# Patient Record
Sex: FEMALE | Race: WHITE | NOT HISPANIC OR LATINO | Employment: FULL TIME | URBAN - METROPOLITAN AREA
[De-identification: names, ages, dates, MRNs, and addresses within clinical notes are randomized per-mention and may not be internally consistent; named-entity substitution may affect disease eponyms.]

---

## 2017-10-03 ENCOUNTER — GENERIC CONVERSION - ENCOUNTER (OUTPATIENT)
Dept: OTHER | Facility: OTHER | Age: 27
End: 2017-10-03

## 2017-11-24 ENCOUNTER — APPOINTMENT (EMERGENCY)
Dept: RADIOLOGY | Facility: HOSPITAL | Age: 27
End: 2017-11-24
Payer: COMMERCIAL

## 2017-11-24 ENCOUNTER — HOSPITAL ENCOUNTER (EMERGENCY)
Facility: HOSPITAL | Age: 27
Discharge: HOME | End: 2017-11-24
Attending: EMERGENCY MEDICINE | Admitting: EMERGENCY MEDICINE
Payer: COMMERCIAL

## 2017-11-24 VITALS
DIASTOLIC BLOOD PRESSURE: 85 MMHG | OXYGEN SATURATION: 97 % | SYSTOLIC BLOOD PRESSURE: 130 MMHG | HEART RATE: 107 BPM | RESPIRATION RATE: 18 BRPM | WEIGHT: 200 LBS | TEMPERATURE: 99.2 F

## 2017-11-24 DIAGNOSIS — S80.211A ABRASION OF RIGHT KNEE, INITIAL ENCOUNTER: ICD-10-CM
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
## 2017-11-24 DIAGNOSIS — M25.531 WRIST PAIN, ACUTE, RIGHT: ICD-10-CM

## 2017-11-24 PROCEDURE — 90715 TDAP VACCINE 7 YRS/> IM: CPT | Performed by: EMERGENCY MEDICINE

## 2017-11-24 PROCEDURE — 99284 EMERGENCY DEPT VISIT MOD MDM: CPT

## 2017-11-24 PROCEDURE — 73110 X-RAY EXAM OF WRIST: CPT

## 2017-11-24 PROCEDURE — 90471 IMMUNIZATION ADMIN: CPT

## 2017-11-24 PROCEDURE — 73564 X-RAY EXAM KNEE 4 OR MORE: CPT

## 2017-11-24 PROCEDURE — 96372 THER/PROPH/DIAG INJ SC/IM: CPT

## 2017-11-24 RX ORDER — KETOROLAC TROMETHAMINE 30 MG/ML
15 INJECTION, SOLUTION INTRAMUSCULAR; INTRAVENOUS ONCE
Status: COMPLETED | OUTPATIENT
Start: 2017-11-24 | End: 2017-11-24

## 2017-11-24 RX ORDER — DROSPIRENONE AND ETHINYL ESTRADIOL 0.03MG-3MG
1 KIT ORAL DAILY
COMMUNITY
End: 2018-09-10 | Stop reason: SDUPTHER

## 2017-11-24 RX ADMIN — TETANUS TOXOID, REDUCED DIPHTHERIA TOXOID AND ACELLULAR PERTUSSIS VACCINE, ADSORBED 0.5 ML: 5; 2.5; 8; 8; 2.5 SUSPENSION INTRAMUSCULAR at 02:44

## 2017-11-24 RX ADMIN — KETOROLAC TROMETHAMINE 15 MG: 30 INJECTION, SOLUTION INTRAMUSCULAR at 02:41
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
## 2017-11-24 NOTE — DISCHARGE INSTRUCTIONS
Abrasion   WHAT YOU NEED TO KNOW:   What is an abrasion? An abrasion is a scrape on your skin  It happens when your skin rubs against a rough surface  Some examples of an abrasion include rug burn, a skinned elbow, or road rash  Abrasions can be many shapes and sizes  The wound may hurt, bleed, bruise, or swell  How can I care for my abrasion? · Wash your hands and dry them with a clean towel  · Press a clean cloth against your wound to stop any bleeding  · Rinse your wound with a lot of clean water  Do not use harsh soap, alcohol, or iodine solutions  · Use a clean, wet cloth to remove any objects, such as small pieces of rocks or dirt  · Rub antibiotic ointment on your wound  This may help prevent infection and help your wound heal     · Cover the wound with a non-stick bandage  Change the bandage daily, and if gets wet or dirty  When should I seek immediate care? · The bleeding does not stop after 10 minutes of firm pressure  · You cannot rinse one or more foreign objects out of your wound  · You have red streaks on your skin coming from your wound  When should I contact my healthcare provider? · You have a fever or chills  · Your abrasion is red, warm, swollen, or draining pus  · You have questions or concerns about your condition or care  CARE AGREEMENT:   You have the right to help plan your care  Learn about your health condition and how it may be treated  Discuss treatment options with your caregivers to decide what care you want to receive  You always have the right to refuse treatment  The above information is an  only  It is not intended as medical advice for individual conditions or treatments  Talk to your doctor, nurse or pharmacist before following any medical regimen to see if it is safe and effective for you    © 2017 Juan Jose0 Jose Iglesias Information is for End User's use only and may not be sold, redistributed or otherwise used for commercial purposes  All illustrations and images included in CareNotes® are the copyrighted property of A D A M , Inc  or [employer]

## 2018-01-10 NOTE — MISCELLANEOUS
Message   Recorded as Task   Date: 11/02/2016 01:31 PM, Created By: Radha Burton   Task Name: Med Renewal Request   Assigned To: Milly Saavedra   Regarding Patient: Luma Herndon, Status: Active   Comment:    Radha Burton - 02 Nov 2016 1:31 PM     TASK CREATED  Caller: Self; Renew Medication; [phone] (Home)  Patient said her insurance is requiring a 90 day supply of her birth control versus the 30 days  She is requesting [facility] - 13 Nov 2016 1:57 PM     TASK EDITED                 sent rx to pharm        Active Problems    1  Cervical cancer screening (V76 2) (Z12 4)   2  Encounter for gynecological examination without abnormal finding (V72 31) (Z01 419)   3  Encounter for routine gynecological examination (V72 31) (Z01 419)   4  Surveillance of previously prescribed contraceptive pill (V25 41) (Z30 41)    Current Meds   1  Drospirenone-Ethinyl Estradiol 3-0 03 MG Oral Tablet; take 1 tablet by mouth daily; Therapy: 38SFR3905 to (Brennon Freitas)  Requested for: 21XGD1669; Last   Rx:31Oct2016 Ordered   2  Janet 3-0 03 MG Oral Tablet; take 1 tablet by mouth daily; Therapy: 04GKT4585 to [phone])  Requested for: 57Laq7666; Last   Rx:57Upn5815 Ordered    Allergies    1   Latex Gloves MISC    Plan  Health Maintenance    · Janet 3-0 03 MG Oral Tablet; take 1 tablet by mouth daily    Signatures   Electronically signed by : Noemy Nicole, ; Nov 2 2016  1:58PM EST                       (Author)

## 2018-01-12 NOTE — MISCELLANEOUS
Message   Recorded as Task   Date: 09/08/2016 09:29 AM, Created By: Madison Danielle   Task Name: Med Renewal Request   Assigned To: Milly Saavedra   Regarding Patient: Bina Lamas, Status: Active   Comment:    Sera Chacon - 08 Sep 2016 9:29 AM     TASK CREATED  Pt needs a refill on her B/C pills  Please advise  [facility] - 27 Sep 2016 9:31 AM     TASK EDITED                 sent to pharm,ángel mccracken/ komal next week        Active Problems    1  Encounter for routine gynecological examination (V72 31) (Z01 419)    Current Meds   1  Jnaet 3-0 03 MG Oral Tablet; take 1 tablet by mouth daily; Therapy: 23VNK8713 to (Evaluate:35Eek3241)  Requested for: 25UCH3333; Last   Rx:92Mmq0376 Ordered    Allergies    1   Latex Gloves MISC    Plan  Encounter for routine gynecological examination    · Janet 3-0 03 MG Oral Tablet; take 1 tablet by mouth daily    Signatures   Electronically signed by : Jesus Jauregui, ; Sep  8 2016  9:31AM EST                       (Author)

## 2018-01-15 NOTE — MISCELLANEOUS
Message   Recorded as Task   Date: 11/02/2016 01:31 PM, Created By: Radah Burton   Task Name: Med Renewal Request   Assigned To: Milly Saavedra   Regarding Patient: Marcus Morales, Status: Active   Comment:    Radha Burton - 02 Nov 2016 1:31 PM     TASK CREATED  Caller: Self; Renew Medication; [phone] (Home)  Patient said her insurance is requiring a 90 day supply of her birth control versus the 30 days  [facility] - 51 Nov 2016 1:57 PM     TASK EDITED                 sent rx to pharm        Active Problems    1  Cervical cancer screening (V76 2) (Z12 4)   2  Encounter for gynecological examination without abnormal finding (V72 31) (Z01 419)   3  Encounter for routine gynecological examination (V72 31) (Z01 419)   4  Surveillance of previously prescribed contraceptive pill (V25 41) (Z30 41)    Current Meds   1  Drospirenone-Ethinyl Estradiol 3-0 03 MG Oral Tablet; take 1 tablet by mouth daily; Therapy: 84JHM5711 to (Anita Robles)  Requested for: 42YJJ8745; Last   Rx:31Oct2016 Ordered   2  Janet 3-0 03 MG Oral Tablet; take 1 tablet by mouth daily; Therapy: 78JQJ0566 to (Hector Sports)  Requested for: 77Rdh1587; Last   Rx:22Kby9470 Ordered    Allergies    1   Latex Gloves MISC    Plan  Health Maintenance    · Janet 3-0 03 MG Oral Tablet; take 1 tablet by mouth daily    Signatures   Electronically signed by : Josh Brown, ; Nov 2 2016  1:58PM EST                       (Author)

## 2018-01-22 VITALS
SYSTOLIC BLOOD PRESSURE: 122 MMHG | DIASTOLIC BLOOD PRESSURE: 76 MMHG | WEIGHT: 202 LBS | HEIGHT: 66 IN | BODY MASS INDEX: 32.47 KG/M2

## 2018-09-07 ENCOUNTER — TELEPHONE (OUTPATIENT)
Dept: OBGYN CLINIC | Facility: CLINIC | Age: 28
End: 2018-09-07

## 2018-09-07 DIAGNOSIS — IMO0001 BIRTH CONTROL: Primary | ICD-10-CM

## 2018-09-07 RX ORDER — DROSPIRENONE AND ETHINYL ESTRADIOL 0.03MG-3MG
1 KIT ORAL DAILY
Qty: 28 TABLET | Refills: 1 | Status: SHIPPED | OUTPATIENT
Start: 2018-09-07 | End: 2018-09-10 | Stop reason: SDUPTHER

## 2018-09-07 NOTE — TELEPHONE ENCOUNTER
Pt called office today, left vm message  Pt called to request refill of BC pills  Pt saw Karley Campos on 10/3/17, scheduled to see her again on 10/5/18  Pt can be reached @ [phone]

## 2018-09-10 RX ORDER — DROSPIRENONE AND ETHINYL ESTRADIOL 0.03MG-3MG
1 KIT ORAL DAILY
Qty: 28 TABLET | Refills: 1 | Status: SHIPPED | OUTPATIENT
Start: 2018-09-10 | End: 2018-10-29 | Stop reason: SDUPTHER

## 2018-09-10 NOTE — TELEPHONE ENCOUNTER
Spoke with Pt today via phone call  Pt informed that Rx refill for Ashley BC pills was forwarded to Pt's pharmacy in EHR by [facility]

## 2018-10-29 DIAGNOSIS — IMO0001 BIRTH CONTROL: ICD-10-CM

## 2018-10-29 RX ORDER — DROSPIRENONE AND ETHINYL ESTRADIOL 0.03MG-3MG
1 KIT ORAL DAILY
Qty: 90 TABLET | Refills: 0 | Status: SHIPPED | OUTPATIENT
Start: 2018-10-29 | End: 2018-11-05 | Stop reason: SDUPTHER

## 2018-11-05 DIAGNOSIS — Z01.419 ENCOUNTER FOR GYNECOLOGICAL EXAMINATION WITHOUT ABNORMAL FINDING: Primary | ICD-10-CM

## 2018-11-05 RX ORDER — DROSPIRENONE AND ETHINYL ESTRADIOL 0.03MG-3MG
1 KIT ORAL DAILY
Qty: 90 TABLET | Refills: 0 | Status: SHIPPED | OUTPATIENT
Start: 2018-11-05 | End: 2019-01-08 | Stop reason: SDUPTHER

## 2019-01-08 ENCOUNTER — ANNUAL EXAM (OUTPATIENT)
Dept: OBGYN CLINIC | Facility: CLINIC | Age: 29
End: 2019-01-08
Payer: COMMERCIAL

## 2019-01-08 VITALS
WEIGHT: 203.4 LBS | SYSTOLIC BLOOD PRESSURE: 122 MMHG | DIASTOLIC BLOOD PRESSURE: 86 MMHG | BODY MASS INDEX: 32.69 KG/M2 | HEIGHT: 66 IN

## 2019-01-08 DIAGNOSIS — Z01.419 ENCOUNTER FOR GYNECOLOGICAL EXAMINATION (GENERAL) (ROUTINE) WITHOUT ABNORMAL FINDINGS: Primary | ICD-10-CM

## 2019-01-08 DIAGNOSIS — Z01.419 ENCOUNTER FOR GYNECOLOGICAL EXAMINATION WITHOUT ABNORMAL FINDING: ICD-10-CM

## 2019-01-08 DIAGNOSIS — Z30.41 SURVEILLANCE OF CONTRACEPTIVE PILL: ICD-10-CM

## 2019-01-08 DIAGNOSIS — E11.9 TYPE 2 DIABETES MELLITUS WITHOUT COMPLICATION, WITHOUT LONG-TERM CURRENT USE OF INSULIN (HCC): ICD-10-CM

## 2019-01-08 DIAGNOSIS — Z12.4 CERVICAL CANCER SCREENING: ICD-10-CM

## 2019-01-08 DIAGNOSIS — B35.9 TINEA: ICD-10-CM

## 2019-01-08 PROCEDURE — G0145 SCR C/V CYTO,THINLAYER,RESCR: HCPCS | Performed by: NURSE PRACTITIONER

## 2019-01-08 PROCEDURE — 99395 PREV VISIT EST AGE 18-39: CPT | Performed by: NURSE PRACTITIONER

## 2019-01-08 RX ORDER — DROSPIRENONE AND ETHINYL ESTRADIOL 0.03MG-3MG
1 KIT ORAL DAILY
Qty: 90 TABLET | Refills: 4 | Status: SHIPPED | OUTPATIENT
Start: 2019-01-08 | End: 2020-01-14 | Stop reason: SDUPTHER

## 2019-01-08 RX ORDER — METFORMIN HYDROCHLORIDE 750 MG/1
1500 TABLET, EXTENDED RELEASE ORAL DAILY
Refills: 0 | COMMUNITY
Start: 2018-10-22 | End: 2021-02-10 | Stop reason: ALTCHOICE

## 2019-01-08 RX ORDER — PRENATAL VIT 91/IRON/FOLIC/DHA 28-975-200
COMBINATION PACKAGE (EA) ORAL
Qty: 36 G | Refills: 0 | Status: SHIPPED | OUTPATIENT
Start: 2019-01-08 | End: 2021-05-06 | Stop reason: ALTCHOICE

## 2019-01-08 RX ORDER — GLIMEPIRIDE 2 MG/1
2 TABLET ORAL DAILY
Refills: 0 | COMMUNITY
Start: 2018-12-08 | End: 2020-01-14 | Stop reason: ALTCHOICE

## 2019-01-08 NOTE — ASSESSMENT & PLAN NOTE
The patient likes current OCP  She denies ACHES and desires to continue  Refill provided  Reviewed reasons to call

## 2019-01-08 NOTE — ASSESSMENT & PLAN NOTE
Recent DM2 diagnosis  The patient reports ha1c was 9 at the time of dx but since starting metformin and glimepiride she reports ha1c has come down to 5ish  She has f/u with PCP arranged in about 2 weeks to discuss med management; she is under the impression that amaryl will be d/c'd  PCP is out of network, thus hx is per pt  Today we discussed preconception considerations and ob implications assoc with this diagnosis  She was advised to call us prior to actively trying to conceive so we can review her meds and make recommendations  She is agreeable to plan

## 2019-01-08 NOTE — ASSESSMENT & PLAN NOTE
Pruritic area over inner upper aspect of left breast with features of tinea  Advised terbinafine cream and conservative skin care   F/u if sx not improved

## 2019-01-08 NOTE — PROGRESS NOTES
Assessment/Plan:    Surveillance of contraceptive pill  The patient likes current OCP  She denies ACHES and desires to continue  Refill provided  Reviewed reasons to call  Type 2 diabetes mellitus without complication (HCC)  Recent DM2 diagnosis  The patient reports ha1c was 9 at the time of dx but since starting metformin and glimepiride she reports ha1c has come down to 5ish  She has f/u with PCP arranged in about 2 weeks to discuss med management; she is under the impression that amaryl will be d/c'd  PCP is out of network, thus hx is per pt  Today we discussed preconception considerations and ob implications assoc with this diagnosis  She was advised to call us prior to actively trying to conceive so we can review her meds and make recommendations  She is agreeable to plan  Encounter for gynecological examination (general) (routine) without abnormal findings  Normal findings on routine annual gyn exam  Recommended monthly SBE, annual CBE  Reviewed ASCCP guidelines and pap was collected  STI testing was offered and declined at this time; the patient is aware that condoms are recommended for all sexual contact for prevention of STI  The patient likes current contraceptive measure and desires to continue  Reviewed diet/activity recommendations and reasons to call  F/u in one year for routine annual gyn exam or sooner PRN  Tinea  Pruritic area over inner upper aspect of left breast with features of tinea  Advised terbinafine cream and conservative skin care  F/u if sx not improved       Diagnoses and all orders for this visit:    Encounter for gynecological examination (general) (routine) without abnormal findings    Cervical cancer screening  -     Liquid-based pap, screening    Surveillance of contraceptive pill    Encounter for gynecological examination without abnormal finding  -     drospirenone-ethinyl estradiol (MARY) 3-0 03 MG per tablet;  Take 1 tablet by mouth daily    Type 2 diabetes mellitus without complication, without long-term current use of insulin (HCC)    Tinea  -     terbinafine (LamISIL) 1 % cream; Apply to affected area once daily for 7 days    Other orders  -     glimepiride (AMARYL) 2 mg tablet; Take 2 mg by mouth daily  -     metFORMIN (GLUCOPHAGE-XR) 750 mg 24 hr tablet; Take 1,500 mg by mouth daily          Subjective:      Patient ID: Clive Smith is a 34 y o  female  This patient presents for routine annual gyn exam    Recently diagnosed with DM2  a1c has improved since starting PO meds  F/u with PCP is scheduled  C/o left breast with itchy spot occurring intermittently for several months  Denies rash, lesions  Hydrocortisone somewhat effective  She denies acute gyn complaints  Likes current OCP and desires to continue; denies ACHES  She denies pelvic pain, abn discharge, breast concerns, bowel/bladder dysfunction, depression/anx   and monog  Denies STI concerns  Considering conception in the next year  Now works at [employer] her new job much more than prior position at [email or web address]         The following portions of the patient's history were reviewed and updated as appropriate: allergies, current medications, past family history, past medical history, past social history, past surgical history and problem list     Review of Systems   Constitutional: Negative  HENT: Negative  Eyes: Negative  Respiratory: Negative  Cardiovascular: Negative  Gastrointestinal: Negative  Endocrine: Negative  Genitourinary: Negative  Musculoskeletal: Negative  Skin: Negative  Itchy area on left breast   Allergic/Immunologic: Negative  Neurological: Negative  Hematological: Negative  Psychiatric/Behavioral: Negative            Objective:      /86 (BP Location: Right arm, Cuff Size: Large)   Ht 5' 6" (1 676 m)   Wt 92 3 kg (203 lb 6 4 oz)   LMP 12/28/2018   BMI 32 83 kg/m²          Physical Exam   Constitutional: She is oriented to person, place, and time  She appears well-developed and well-nourished  HENT:   Head: Normocephalic and atraumatic  Eyes: Pupils are equal, round, and reactive to light  EOM are normal    Neck: Normal range of motion  Neck supple  No thyromegaly present  Cardiovascular: Normal rate, regular rhythm and normal heart sounds  Pulmonary/Chest: Effort normal and breath sounds normal  No respiratory distress  She has no wheezes  She has no rales  She exhibits no mass, no tenderness and no deformity  Right breast exhibits no inverted nipple, no mass, no nipple discharge, no skin change and no tenderness  Left breast exhibits skin change  Left breast exhibits no inverted nipple, no mass, no nipple discharge and no tenderness  Breasts are symmetrical        Abdominal: Soft  She exhibits no distension and no mass  There is no splenomegaly or hepatomegaly  There is no tenderness  There is no rebound and no guarding  Genitourinary: Rectum normal, vagina normal and uterus normal  No breast swelling, tenderness or discharge  No labial fusion  There is no rash, tenderness, lesion or injury on the right labia  There is no rash, tenderness, lesion or injury on the left labia  Cervix exhibits no motion tenderness, no discharge and no friability  Right adnexum displays no mass, no tenderness and no fullness  Left adnexum displays no mass, no tenderness and no fullness  No erythema, tenderness or bleeding in the vagina  No foreign body in the vagina  No vaginal discharge found  Musculoskeletal: Normal range of motion  Lymphadenopathy:     She has no cervical adenopathy  She has no axillary adenopathy  Neurological: She is alert and oriented to person, place, and time  No cranial nerve deficit  Skin: Skin is warm and dry  No rash noted  No cyanosis  Nails show no clubbing  Psychiatric: She has a normal mood and affect   Her speech is normal and behavior is normal  Judgment and thought content normal  Cognition and memory are normal

## 2019-01-08 NOTE — ASSESSMENT & PLAN NOTE
Normal findings on routine annual gyn exam  Recommended monthly SBE, annual CBE  Reviewed ASCCP guidelines and pap was collected  STI testing was offered and declined at this time; the patient is aware that condoms are recommended for all sexual contact for prevention of STI  The patient likes current contraceptive measure and desires to continue  Reviewed diet/activity recommendations and reasons to call  F/u in one year for routine annual gyn exam or sooner PRN

## 2019-01-10 ENCOUNTER — TELEPHONE (OUTPATIENT)
Dept: OBGYN CLINIC | Facility: CLINIC | Age: 29
End: 2019-01-10

## 2019-01-10 LAB
LAB AP GYN PRIMARY INTERPRETATION: NORMAL
LAB AP LMP: NORMAL
Lab: NORMAL

## 2019-01-10 NOTE — TELEPHONE ENCOUNTER
LMOM today @ [phone] per Pt's signed communication consent form in Pt's EHR  Pt informed, via vm message, that her recent Pap smear test result was WNL per CARLOS August's review  Result card mailed to Pt's mailing address listed in Pt's EHR

## 2019-05-23 ENCOUNTER — TELEPHONE (OUTPATIENT)
Dept: OBGYN CLINIC | Facility: CLINIC | Age: 29
End: 2019-05-23

## 2019-05-23 DIAGNOSIS — R30.0 DYSURIA: Primary | ICD-10-CM

## 2019-05-23 DIAGNOSIS — R35.0 FREQUENT URINATION: Primary | ICD-10-CM

## 2019-05-23 RX ORDER — NITROFURANTOIN 25; 75 MG/1; MG/1
100 CAPSULE ORAL 2 TIMES DAILY
Qty: 14 CAPSULE | Refills: 0 | Status: SHIPPED | OUTPATIENT
Start: 2019-05-23 | End: 2020-01-07 | Stop reason: ALTCHOICE

## 2019-05-24 ENCOUNTER — APPOINTMENT (OUTPATIENT)
Dept: LAB | Facility: CLINIC | Age: 29
End: 2019-05-24
Payer: COMMERCIAL

## 2019-05-24 DIAGNOSIS — R35.0 FREQUENT URINATION: ICD-10-CM

## 2019-05-24 LAB
BACTERIA UR QL AUTO: ABNORMAL /HPF
BILIRUB UR QL STRIP: NEGATIVE
CLARITY UR: CLEAR
COLOR UR: YELLOW
GLUCOSE UR STRIP-MCNC: NEGATIVE MG/DL
HGB UR QL STRIP.AUTO: NEGATIVE
HYALINE CASTS #/AREA URNS LPF: ABNORMAL /LPF
KETONES UR STRIP-MCNC: NEGATIVE MG/DL
LEUKOCYTE ESTERASE UR QL STRIP: NEGATIVE
NITRITE UR QL STRIP: POSITIVE
NON-SQ EPI CELLS URNS QL MICRO: ABNORMAL /HPF
PH UR STRIP.AUTO: 7 [PH]
PROT UR STRIP-MCNC: NEGATIVE MG/DL
RBC #/AREA URNS AUTO: ABNORMAL /HPF
SP GR UR STRIP.AUTO: 1.02 (ref 1–1.03)
UROBILINOGEN UR QL STRIP.AUTO: 0.2 E.U./DL
WBC #/AREA URNS AUTO: ABNORMAL /HPF

## 2019-05-24 PROCEDURE — 87086 URINE CULTURE/COLONY COUNT: CPT

## 2019-05-24 PROCEDURE — 87077 CULTURE AEROBIC IDENTIFY: CPT

## 2019-05-24 PROCEDURE — 81001 URINALYSIS AUTO W/SCOPE: CPT

## 2019-05-24 PROCEDURE — 87186 SC STD MICRODIL/AGAR DIL: CPT

## 2019-05-26 LAB — BACTERIA UR CULT: ABNORMAL

## 2019-05-28 ENCOUNTER — TELEPHONE (OUTPATIENT)
Dept: OBGYN CLINIC | Facility: CLINIC | Age: 29
End: 2019-05-28

## 2019-06-19 ENCOUNTER — TELEPHONE (OUTPATIENT)
Dept: OBGYN CLINIC | Facility: CLINIC | Age: 29
End: 2019-06-19

## 2019-06-19 DIAGNOSIS — R30.0 DYSURIA: Primary | ICD-10-CM

## 2019-06-19 DIAGNOSIS — R35.0 FREQUENT URINATION: Primary | ICD-10-CM

## 2019-06-19 RX ORDER — SULFAMETHOXAZOLE AND TRIMETHOPRIM 800; 160 MG/1; MG/1
1 TABLET ORAL EVERY 12 HOURS SCHEDULED
Qty: 14 TABLET | Refills: 0 | Status: SHIPPED | OUTPATIENT
Start: 2019-06-19 | End: 2019-06-26

## 2019-06-20 ENCOUNTER — APPOINTMENT (OUTPATIENT)
Dept: LAB | Facility: CLINIC | Age: 29
End: 2019-06-20
Payer: COMMERCIAL

## 2019-06-20 DIAGNOSIS — R35.0 FREQUENT URINATION: ICD-10-CM

## 2019-06-20 LAB
BACTERIA UR QL AUTO: ABNORMAL /HPF
BILIRUB UR QL STRIP: NEGATIVE
CLARITY UR: ABNORMAL
COLOR UR: YELLOW
GLUCOSE UR STRIP-MCNC: ABNORMAL MG/DL
HGB UR QL STRIP.AUTO: ABNORMAL
HYALINE CASTS #/AREA URNS LPF: ABNORMAL /LPF
KETONES UR STRIP-MCNC: NEGATIVE MG/DL
LEUKOCYTE ESTERASE UR QL STRIP: ABNORMAL
NITRITE UR QL STRIP: NEGATIVE
NON-SQ EPI CELLS URNS QL MICRO: ABNORMAL /HPF
PH UR STRIP.AUTO: 6.5 [PH]
PROT UR STRIP-MCNC: ABNORMAL MG/DL
RBC #/AREA URNS AUTO: ABNORMAL /HPF
SP GR UR STRIP.AUTO: 1.02 (ref 1–1.03)
UROBILINOGEN UR QL STRIP.AUTO: 0.2 E.U./DL
WBC #/AREA URNS AUTO: ABNORMAL /HPF

## 2019-06-20 PROCEDURE — 81001 URINALYSIS AUTO W/SCOPE: CPT

## 2019-06-20 PROCEDURE — 87086 URINE CULTURE/COLONY COUNT: CPT

## 2019-06-22 LAB — BACTERIA UR CULT: NORMAL

## 2019-06-25 ENCOUNTER — TELEPHONE (OUTPATIENT)
Dept: OBGYN CLINIC | Facility: CLINIC | Age: 29
End: 2019-06-25

## 2020-01-07 ENCOUNTER — TELEPHONE (OUTPATIENT)
Dept: OBGYN CLINIC | Facility: CLINIC | Age: 30
End: 2020-01-07

## 2020-01-07 ENCOUNTER — APPOINTMENT (OUTPATIENT)
Dept: LAB | Facility: CLINIC | Age: 30
End: 2020-01-07
Payer: COMMERCIAL

## 2020-01-07 DIAGNOSIS — R30.0 DYSURIA: Primary | ICD-10-CM

## 2020-01-07 DIAGNOSIS — N39.0 URINARY TRACT INFECTION WITHOUT HEMATURIA, SITE UNSPECIFIED: ICD-10-CM

## 2020-01-07 DIAGNOSIS — N39.0 URINARY TRACT INFECTION WITHOUT HEMATURIA, SITE UNSPECIFIED: Primary | ICD-10-CM

## 2020-01-07 LAB
BACTERIA UR QL AUTO: ABNORMAL /HPF
BILIRUB UR QL STRIP: NEGATIVE
CLARITY UR: ABNORMAL
COLOR UR: YELLOW
GLUCOSE UR STRIP-MCNC: ABNORMAL MG/DL
HGB UR QL STRIP.AUTO: ABNORMAL
HYALINE CASTS #/AREA URNS LPF: ABNORMAL /LPF
KETONES UR STRIP-MCNC: NEGATIVE MG/DL
LEUKOCYTE ESTERASE UR QL STRIP: ABNORMAL
NITRITE UR QL STRIP: NEGATIVE
NON-SQ EPI CELLS URNS QL MICRO: ABNORMAL /HPF
PH UR STRIP.AUTO: 6 [PH]
PROT UR STRIP-MCNC: ABNORMAL MG/DL
RBC #/AREA URNS AUTO: ABNORMAL /HPF
SP GR UR STRIP.AUTO: 1.04 (ref 1–1.03)
UROBILINOGEN UR QL STRIP.AUTO: 0.2 E.U./DL
WBC #/AREA URNS AUTO: ABNORMAL /HPF

## 2020-01-07 PROCEDURE — 87086 URINE CULTURE/COLONY COUNT: CPT

## 2020-01-07 PROCEDURE — 87186 SC STD MICRODIL/AGAR DIL: CPT

## 2020-01-07 PROCEDURE — 81001 URINALYSIS AUTO W/SCOPE: CPT

## 2020-01-07 PROCEDURE — 87077 CULTURE AEROBIC IDENTIFY: CPT

## 2020-01-07 RX ORDER — NITROFURANTOIN 25; 75 MG/1; MG/1
100 CAPSULE ORAL 2 TIMES DAILY
Qty: 14 CAPSULE | Refills: 0 | Status: SHIPPED | OUTPATIENT
Start: 2020-01-07 | End: 2020-01-09 | Stop reason: ALTCHOICE

## 2020-01-07 NOTE — TELEPHONE ENCOUNTER
Please ask that she calls to let us know when the urine has been submitted and I will eRx antibiotics  Please advise pushing fluids and observiing for sx of pyelo

## 2020-01-07 NOTE — TELEPHONE ENCOUNTER
Returned Pt's call today  Pt informed that Elin Nieto electronically forwarded Rx for Macrobid 100 mg capsule (Take 1 capsule PO BID for 7 days - dispense 14 refills 0) to Pt's pharmacy listed in EHR  Reiterated to Pt that if urinary symptoms worsen or do not improve after completion of medication, to contact office

## 2020-01-07 NOTE — TELEPHONE ENCOUNTER
Dear Emanuel Snell:    Pt called office today c/o UTI symptoms  Pt states she has UTI history  Pt saw you on 1/8/19 (yearly exam), scheduled to see you again on 1/14/20  Pt further states she has been experiencing UTI symptoms for approximately three days now  Pt reports urinary frequency, burning during urination, and urine with cloudy appearance  Pt denies fever, pyelo symptoms, gross hematuria, and vaginal symptoms at this time  Pt states she has no known allergies to medication at this time  Pt further states she is sexually active with same partner, uses OCP for contraception  Lab orders completed for UA & UC today in Psychiatric (Pt states she uses [address] lab facilities)  Please advise  Thank you!     Shu Gaitan MA

## 2020-01-07 NOTE — TELEPHONE ENCOUNTER
Pt returned call to office today  Pt states "she completed UC & UA lab work today  Pt informed that Audrey Reyez will prescribe Pt medication to address UTI symptoms  Pt encouraged to push fluids by mouth in order to flush out urinary symptoms  Pt instructed on symptoms of Pyelonephritis to observe for per [facility] directive  "Patient Call" routed to Audrey Reyez for completion of antibiotic prescription to be electronically forwarded to Pt's pharmacy listed in EHR

## 2020-01-09 DIAGNOSIS — N39.0 URINARY TRACT INFECTION WITHOUT HEMATURIA, SITE UNSPECIFIED: Primary | ICD-10-CM

## 2020-01-09 LAB — BACTERIA UR CULT: ABNORMAL

## 2020-01-09 RX ORDER — CIPROFLOXACIN 500 MG/1
500 TABLET, FILM COATED ORAL EVERY 12 HOURS SCHEDULED
Qty: 14 TABLET | Refills: 0 | Status: SHIPPED | OUTPATIENT
Start: 2020-01-09 | End: 2020-01-16

## 2020-01-10 ENCOUNTER — TELEPHONE (OUTPATIENT)
Dept: OBGYN CLINIC | Facility: CLINIC | Age: 30
End: 2020-01-10

## 2020-01-10 NOTE — TELEPHONE ENCOUNTER
----- Message from Gia Faith, Franklin Iglesias sent at 1/9/2020  3:34 PM EST -----  Urine culture results were resubmitted with sensitivities attached   She is currently on macrobid, which is listed as intermediate   Given this new info I would advise she she switches to Ciprofloxacin   Rx sent   Advise pushing fluid and observing for sx of pyelo

## 2020-01-10 NOTE — TELEPHONE ENCOUNTER
----- Message from [facility], [address] Vanessa Iglesias sent at 1/9/2020 12:43 PM EST -----  Urine studies are consistent with UTI   Please notify patient and recommend completion of antibiotics as ordered   Please advise pushing fluids and observing for sx of pyelonephritis   If sx are not improved following completion of abx she should schedule an office visit for follow up

## 2020-01-14 ENCOUNTER — ANNUAL EXAM (OUTPATIENT)
Dept: OBGYN CLINIC | Facility: CLINIC | Age: 30
End: 2020-01-14
Payer: COMMERCIAL

## 2020-01-14 VITALS — DIASTOLIC BLOOD PRESSURE: 78 MMHG | WEIGHT: 200 LBS | SYSTOLIC BLOOD PRESSURE: 130 MMHG | BODY MASS INDEX: 32.28 KG/M2

## 2020-01-14 DIAGNOSIS — N39.0 RECURRENT UTI: ICD-10-CM

## 2020-01-14 DIAGNOSIS — Z31.69 ENCOUNTER FOR PRECONCEPTION CONSULTATION: ICD-10-CM

## 2020-01-14 DIAGNOSIS — E11.9 TYPE 2 DIABETES MELLITUS TREATED WITHOUT INSULIN (HCC): ICD-10-CM

## 2020-01-14 DIAGNOSIS — Z01.419 ENCOUNTER FOR GYNECOLOGICAL EXAMINATION WITHOUT ABNORMAL FINDING: ICD-10-CM

## 2020-01-14 DIAGNOSIS — Z30.41 SURVEILLANCE OF CONTRACEPTIVE PILL: ICD-10-CM

## 2020-01-14 DIAGNOSIS — Z01.419 ENCOUNTER FOR GYNECOLOGICAL EXAMINATION (GENERAL) (ROUTINE) WITHOUT ABNORMAL FINDINGS: Primary | ICD-10-CM

## 2020-01-14 DIAGNOSIS — E11.9 TYPE 2 DIABETES MELLITUS WITHOUT COMPLICATION, WITHOUT LONG-TERM CURRENT USE OF INSULIN (HCC): ICD-10-CM

## 2020-01-14 PROCEDURE — 99395 PREV VISIT EST AGE 18-39: CPT | Performed by: NURSE PRACTITIONER

## 2020-01-14 RX ORDER — DROSPIRENONE AND ETHINYL ESTRADIOL 0.03MG-3MG
1 KIT ORAL DAILY
Qty: 90 TABLET | Refills: 4 | Status: SHIPPED | OUTPATIENT
Start: 2020-01-14 | End: 2021-02-05

## 2020-01-14 NOTE — ASSESSMENT & PLAN NOTE
Normal findings on routine annual gyn exam  Recommended monthly SBE, annual CBE  Reviewed ASCCP guidelines and pap noted to be up to date  STI testing was offered and declined at this time; the patient is aware that condoms are recommended for all sexual contact for prevention of STI  The patient likes current contraceptive measure and desires to continue (OCP)  Reviewed diet/activity recommendations and reasons to call  F/u in one year for routine annual gyn exam or sooner PRN

## 2020-01-14 NOTE — ASSESSMENT & PLAN NOTE
DM2 was dx'd in 2018  Managed by PCP in [place]; she has not been seen by Endo  Initial baseline a1c was 9 per patient; down to 5ish last year on metformin and glimepiride  Currently on metformin only  She cannot recall when a1c was last done; she believes most recent was 6 5%  Reviewed obstetrical implications of pregestational diabetes  Advised optimizing a1c prior to conception; discussed risks of uncontrolled DM incl miscarriage, birth defects and 3rd trimester considerations  Recommended TSH and a1c and orders were provided for this  Also advised calling prior to TTC, offered preconception consultation to discuss plan  She agrees to consider

## 2020-01-14 NOTE — ASSESSMENT & PLAN NOTE
H/o 3 UTIs since last annual gyn exam  Currently on Cipro and sx have resolved  Recommended completion of urine culture as OMAR after completing meds  Advised pushing fluids and having low threshold for reporting sx  Today Jersey reports that she had h/o recurrent UTI as a child  She believes she was seen by Peds Uro, but denies recalling imaging or scope having been done  Discussed potential benefit of Uro consultation if UTIs continue  She declines referral today but will consider

## 2020-01-14 NOTE — ASSESSMENT & PLAN NOTE
The patient likes current OCP  She denies ACHES and desires to continue  Discussed importance of maintaining normal a1c and reviewed CV risks of uncontrolled DM2  Refill provided  Reviewed reasons to call

## 2020-01-14 NOTE — ASSESSMENT & PLAN NOTE
Discussed preconception considerations at length  She denies fertility risk factors for self or partner  He does have h/o testicular cysts but Jersey reports he was reassured this was not expected to cause fertility issues; advised low threshold for him to f/u with Uro if TTC without success  Recommended maintaining healthy BMI and reviewed diet/activity recommendations  Advised daily PNV to be started at least 3 mos prior to actively trying to conceive for prevention of ONTD and prevention of morning sickness  Reviewed teratogen avoidance, timed intercourse, reasons to call  See separate subheading re: importance of DM2 management  F/u after 3 mos of actively TTC to trouble shoot if conception has not yet occurred, or sooner as needed  The patient agrees with plan

## 2020-01-14 NOTE — PROGRESS NOTES
Assessment/Plan:    Type 2 diabetes mellitus without complication ([facility] [address] )  DM2 was dx'd in 2018  Managed by PCP in [place]; she has not been seen by Endo  Initial baseline a1c was 9 per patient; down to 5ish last year on metformin and glimepiride  Currently on metformin only  She cannot recall when a1c was last done; she believes most recent was 6 5%  Reviewed obstetrical implications of pregestational diabetes  Advised optimizing a1c prior to conception; discussed risks of uncontrolled DM incl miscarriage, birth defects and 3rd trimester considerations  Recommended TSH and a1c and orders were provided for this  Also advised calling prior to TTC, offered preconception consultation to discuss plan  She agrees to consider  Recurrent UTI  H/o 3 UTIs since last annual gyn exam  Currently on Cipro and sx have resolved  Recommended completion of urine culture as OMAR after completing meds  Advised pushing fluids and having low threshold for reporting sx  Today Cody reports that she had h/o recurrent UTI as a child  She believes she was seen by Peds Uro, but denies recalling imaging or scope having been done  Discussed potential benefit of Uro consultation if UTIs continue  She declines referral today but will consider  Surveillance of contraceptive pill  The patient likes current OCP  She denies ACHES and desires to continue  Discussed importance of maintaining normal a1c and reviewed CV risks of uncontrolled DM2  Refill provided  Reviewed reasons to call  Encounter for preconception consultation  Discussed preconception considerations at length  She denies fertility risk factors for self or partner  He does have h/o testicular cysts but Cody reports he was reassured this was not expected to cause fertility issues; advised low threshold for him to f/u with Uro if TTC without success  Recommended maintaining healthy BMI and reviewed diet/activity recommendations   Advised daily PNV to be started at least 3 mos prior to actively trying to conceive for prevention of ONTD and prevention of morning sickness  Reviewed teratogen avoidance, timed intercourse, reasons to call  See separate subheading re: importance of DM2 management  F/u after 3 mos of actively TTC to trouble shoot if conception has not yet occurred, or sooner as needed  The patient agrees with plan  Encounter for gynecological examination (general) (routine) without abnormal findings  Normal findings on routine annual gyn exam  Recommended monthly SBE, annual CBE  Reviewed ASCCP guidelines and pap noted to be up to date  STI testing was offered and declined at this time; the patient is aware that condoms are recommended for all sexual contact for prevention of STI  The patient likes current contraceptive measure and desires to continue ([facility])  Reviewed diet/activity recommendations and reasons to call  F/u in one year for routine annual gyn exam or sooner PRN  Diagnoses and all orders for this visit:    Encounter for gynecological examination (general) (routine) without abnormal findings    Type 2 diabetes mellitus treated without insulin ([facility])  -     Hemoglobin A1C; Future  -     TSH, 3rd generation with Free T4 reflex; Future    Recurrent UTI  -     Urine culture; Future    Type 2 diabetes mellitus without complication, without long-term current use of insulin ([facility] )    Surveillance of contraceptive pill    Encounter for preconception consultation    Encounter for gynecological examination without abnormal finding  -     drospirenone-ethinyl estradiol (MARY) 3-0 03 MG per tablet; Take 1 tablet by mouth daily          Subjective:      Patient ID: Harman Johnson is a 34 y o  female  This patient presents for routine annual gyn exam    Medically stable  H/o DM2 - last a1c 6 5%, she thinks; only taking metformin at this time; [facility] [place] [place] [place]  3 UTIs in the last year  Currently completing Cipro for C&S proven e coli  Asymptomatic  Today she reports h/o recurrent UTI in childhood - saw Uro  Thinking about TTC in the next year  Spouse has h/o testicular cysts - will not impact fertility per Uro  Withdrawal bleeds are light and reg on ALECIA  Denies ACHES and desires to continue  Initial BP elevated today - pt attributes this to being worried she would be late today, thought she came to the wrong office  Recheck by me is normotensive  She denies acute gyn complaints  She denies pelvic pain, breast concerns, abnormal discharge, bowel/bladder dysfunction, depression/anxiety   and monogamous  She denies STI concerns  Bought a house this year and they are renovating       The following portions of the patient's history were reviewed and updated as appropriate: allergies, current medications, past family history, past medical history, past social history, past surgical history and problem list     Review of Systems   Constitutional: Negative  Respiratory: Negative  Cardiovascular: Negative  Gastrointestinal: Negative  Genitourinary: Negative  Musculoskeletal: Negative  Skin: Negative  Neurological: Negative  Psychiatric/Behavioral: Negative  Objective:      /78   Wt 90 7 kg (200 lb)   LMP 12/26/2019   BMI 32 28 kg/m²          Physical Exam   Constitutional: She is oriented to person, place, and time  She appears well-developed and well-nourished  HENT:   Head: Normocephalic and atraumatic  Eyes: Pupils are equal, round, and reactive to light  EOM are normal    Neck: Normal range of motion  Neck supple  No thyromegaly present  Cardiovascular: Normal rate, regular rhythm and normal heart sounds  Pulmonary/Chest: Effort normal and breath sounds normal  No respiratory distress  She has no wheezes  She has no rales  She exhibits no mass, no tenderness and no deformity  Right breast exhibits no inverted nipple, no mass, no nipple discharge, no skin change and no tenderness  Left breast exhibits no inverted nipple, no mass, no nipple discharge, no skin change and no tenderness  No breast tenderness or discharge  Breasts are symmetrical    Abdominal: Soft  She exhibits no distension and no mass  There is no splenomegaly or hepatomegaly  There is no tenderness  There is no rebound and no guarding  Genitourinary: Rectum normal, vagina normal and uterus normal  No breast tenderness or discharge  No labial fusion  There is no rash, tenderness, lesion or injury on the right labia  There is no rash, tenderness, lesion or injury on the left labia  Cervix exhibits no motion tenderness, no discharge and no friability  Right adnexum displays no mass, no tenderness and no fullness  Left adnexum displays no mass, no tenderness and no fullness  No erythema, tenderness or bleeding in the vagina  No foreign body in the vagina  No vaginal discharge found  Musculoskeletal: Normal range of motion  Lymphadenopathy:     She has no cervical adenopathy  She has no axillary adenopathy  Neurological: She is alert and oriented to person, place, and time  No cranial nerve deficit  Skin: Skin is warm and dry  No rash noted  No cyanosis  Nails show no clubbing  Psychiatric: She has a normal mood and affect   Her speech is normal and behavior is normal  Judgment and thought content normal  Cognition and memory are normal

## 2020-01-21 ENCOUNTER — APPOINTMENT (OUTPATIENT)
Dept: LAB | Facility: CLINIC | Age: 30
End: 2020-01-21
Payer: COMMERCIAL

## 2020-01-21 DIAGNOSIS — N39.0 RECURRENT UTI: ICD-10-CM

## 2020-01-21 PROCEDURE — 87086 URINE CULTURE/COLONY COUNT: CPT

## 2020-01-22 LAB — BACTERIA UR CULT: NORMAL

## 2020-03-25 ENCOUNTER — TELEPHONE (OUTPATIENT)
Dept: OBGYN CLINIC | Facility: CLINIC | Age: 30
End: 2020-03-25

## 2020-03-25 DIAGNOSIS — N39.0 RECURRENT UTI: Primary | ICD-10-CM

## 2020-03-25 RX ORDER — NITROFURANTOIN 25; 75 MG/1; MG/1
100 CAPSULE ORAL 2 TIMES DAILY
Qty: 14 CAPSULE | Refills: 0 | Status: SHIPPED | OUTPATIENT
Start: 2020-03-25 | End: 2020-04-01

## 2020-03-25 NOTE — TELEPHONE ENCOUNTER
Pt said she is having burning when urinating and urgency, she believes she has a uti hang she has had them before

## 2020-04-23 ENCOUNTER — TELEMEDICINE (OUTPATIENT)
Dept: OBGYN CLINIC | Facility: CLINIC | Age: 30
End: 2020-04-23
Payer: COMMERCIAL

## 2020-04-23 DIAGNOSIS — R39.9 UTI SYMPTOMS: Primary | ICD-10-CM

## 2020-04-23 PROCEDURE — 99214 OFFICE O/P EST MOD 30 MIN: CPT | Performed by: NURSE PRACTITIONER

## 2020-04-23 RX ORDER — CIPROFLOXACIN 500 MG/1
500 TABLET, FILM COATED ORAL EVERY 12 HOURS SCHEDULED
Qty: 14 TABLET | Refills: 0 | Status: SHIPPED | OUTPATIENT
Start: 2020-04-23 | End: 2020-04-30

## 2020-04-24 ENCOUNTER — APPOINTMENT (OUTPATIENT)
Dept: LAB | Facility: MEDICAL CENTER | Age: 30
End: 2020-04-24
Payer: COMMERCIAL

## 2020-04-24 DIAGNOSIS — R39.9 UTI SYMPTOMS: ICD-10-CM

## 2020-04-24 LAB
BACTERIA UR QL AUTO: ABNORMAL /HPF
BILIRUB UR QL STRIP: NEGATIVE
CLARITY UR: ABNORMAL
COLOR UR: YELLOW
GLUCOSE UR STRIP-MCNC: ABNORMAL MG/DL
HGB UR QL STRIP.AUTO: ABNORMAL
KETONES UR STRIP-MCNC: NEGATIVE MG/DL
LEUKOCYTE ESTERASE UR QL STRIP: ABNORMAL
NITRITE UR QL STRIP: POSITIVE
NON-SQ EPI CELLS URNS QL MICRO: ABNORMAL /HPF
PH UR STRIP.AUTO: 6 [PH]
PROT UR STRIP-MCNC: ABNORMAL MG/DL
RBC #/AREA URNS AUTO: ABNORMAL /HPF
SP GR UR STRIP.AUTO: 1.04 (ref 1–1.03)
UROBILINOGEN UR QL STRIP.AUTO: 0.2 E.U./DL
WBC #/AREA URNS AUTO: ABNORMAL /HPF

## 2020-04-24 PROCEDURE — 87086 URINE CULTURE/COLONY COUNT: CPT

## 2020-04-24 PROCEDURE — 87186 SC STD MICRODIL/AGAR DIL: CPT

## 2020-04-24 PROCEDURE — 87077 CULTURE AEROBIC IDENTIFY: CPT

## 2020-04-24 PROCEDURE — 81001 URINALYSIS AUTO W/SCOPE: CPT

## 2020-04-26 PROBLEM — R39.9 UTI SYMPTOMS: Status: ACTIVE | Noted: 2020-04-26

## 2020-04-26 LAB — BACTERIA UR CULT: ABNORMAL

## 2021-02-04 DIAGNOSIS — Z01.419 ENCOUNTER FOR GYNECOLOGICAL EXAMINATION WITHOUT ABNORMAL FINDING: ICD-10-CM

## 2021-02-05 RX ORDER — DROSPIRENONE AND ETHINYL ESTRADIOL 0.03MG-3MG
KIT ORAL
Qty: 84 TABLET | Refills: 0 | Status: SHIPPED | OUTPATIENT
Start: 2021-02-05 | End: 2021-04-28

## 2021-02-09 ENCOUNTER — ANNUAL EXAM (OUTPATIENT)
Dept: OBGYN CLINIC | Facility: CLINIC | Age: 31
End: 2021-02-09
Payer: COMMERCIAL

## 2021-02-09 DIAGNOSIS — E11.9 TYPE 2 DIABETES MELLITUS WITHOUT COMPLICATION, WITHOUT LONG-TERM CURRENT USE OF INSULIN (HCC): ICD-10-CM

## 2021-02-09 DIAGNOSIS — Z30.41 SURVEILLANCE OF CONTRACEPTIVE PILL: ICD-10-CM

## 2021-02-09 DIAGNOSIS — Z01.419 ENCOUNTER FOR GYNECOLOGICAL EXAMINATION (GENERAL) (ROUTINE) WITHOUT ABNORMAL FINDINGS: Primary | ICD-10-CM

## 2021-02-09 DIAGNOSIS — R03.0 ELEVATED BLOOD PRESSURE READING IN OFFICE WITHOUT DIAGNOSIS OF HYPERTENSION: ICD-10-CM

## 2021-02-09 PROCEDURE — S0612 ANNUAL GYNECOLOGICAL EXAMINA: HCPCS | Performed by: NURSE PRACTITIONER

## 2021-02-09 NOTE — ASSESSMENT & PLAN NOTE
Initial BP elevated; repeat BP by me was normotensive  Advised f/u with PCP for further evaluation  Discussed recommendations to transition from [address] to progesterone only or hormone free option if cHTN is diagnosed; discussed increased risk of VTE  She will call after seeing PCP and refill for 3 packs will be provided for now  Likes current brand and denies complaints

## 2021-02-09 NOTE — ASSESSMENT & PLAN NOTE
Normal findings on routine annual gyn exam  Recommended monthly SBE, annual CBE  Reviewed ASCCP guidelines and pap noted to be up to date  STI testing was offered and declined at this time; the patient is aware that condoms are recommended for all sexual contact for prevention of STI  The patient likes current contraceptive measure and desires to continue ([facility])  Reviewed diet/activity recommendations and reasons to call  F/u in one year for routine annual gyn exam or sooner PRN

## 2021-02-10 VITALS — DIASTOLIC BLOOD PRESSURE: 84 MMHG | SYSTOLIC BLOOD PRESSURE: 128 MMHG | BODY MASS INDEX: 31.47 KG/M2 | WEIGHT: 195 LBS

## 2021-02-10 PROBLEM — Z31.69 ENCOUNTER FOR PRECONCEPTION CONSULTATION: Status: RESOLVED | Noted: 2020-01-14 | Resolved: 2021-02-10

## 2021-02-10 NOTE — PROGRESS NOTES
Assessment/Plan:    Encounter for gynecological examination (general) (routine) without abnormal findings  Normal findings on routine annual gyn exam  Recommended monthly SBE, annual CBE  Reviewed ASCCP guidelines and pap noted to be up to date  STI testing was offered and declined at this time; the patient is aware that condoms are recommended for all sexual contact for prevention of STI  The patient likes current contraceptive measure and desires to continue ([address] [place])  Reviewed diet/activity recommendations and reasons to call  F/u in one year for routine annual gyn exam or sooner PRN  Surveillance of contraceptive pill  Initial BP elevated; repeat BP by me was normotensive  Advised f/u with PCP for further evaluation  Discussed recommendations to transition from [address] [place] to progesterone only or hormone free option if cHTN is diagnosed; discussed increased risk of VTE  She will call after seeing PCP and refill for 3 packs will be provided for now  Likes current brand and denies complaints  Elevated blood pressure reading in office without diagnosis of hypertension  See discussion under contra surveillance subheading     Type 2 diabetes mellitus without complication ([address] )  H/o DM2, managed by PCP  The patient reports labs were normal last year and she was advised to d/c metformin  Recommended checking current ha1c and order slip was provided for this  Reviewed importance of glycemic control in the context of future pregnancy  Diagnoses and all orders for this visit:    Encounter for gynecological examination (general) (routine) without abnormal findings    Elevated blood pressure reading in office without diagnosis of hypertension    Type 2 diabetes mellitus without complication, without long-term current use of insulin ([address] )  -     Hemoglobin A1C; Future    Surveillance of contraceptive pill          Subjective:      Patient ID: Willa Jackson is a 31 y o  female      This patient presents for routine annual gyn exam    Medically stable  DM2 is resolved per pt; PCP advised d/c'ing meds last year as labs were normal  Elevated BP on arrival; pt denies dx of HTN, attributes this to feeling nervous  Withdrawal bleeds are light and reg on ALECIA  Denies ACHES and desires to continue  She denies acute gyn complaints  She denies pelvic pain, breast concerns, abnormal discharge, bowel/bladder dysfunction, depression/anxiety   and monogamous  She denies STI concerns  Considering a pregnancy in fall 2021     Works at [employer]       The following portions of the patient's history were reviewed and updated as appropriate: allergies, current medications, past family history, past medical history, past social history, past surgical history and problem list     Review of Systems   Constitutional: Negative  Respiratory: Negative  Cardiovascular: Negative  Gastrointestinal: Negative  Genitourinary: Negative  Musculoskeletal: Negative  Skin: Negative  Neurological: Negative  Psychiatric/Behavioral: Negative  Objective:      /84   Wt 88 5 kg (195 lb)   LMP 01/14/2021   BMI 31 47 kg/m²          Physical Exam  Constitutional:       Appearance: She is well-developed  HENT:      Head: Normocephalic and atraumatic  Eyes:      Pupils: Pupils are equal, round, and reactive to light  Neck:      Musculoskeletal: Normal range of motion and neck supple  Thyroid: No thyromegaly  Cardiovascular:      Rate and Rhythm: Normal rate and regular rhythm  Heart sounds: Normal heart sounds  Pulmonary:      Effort: Pulmonary effort is normal  No respiratory distress  Breath sounds: Normal breath sounds  No wheezing or rales  Chest:      Chest wall: No mass, deformity or tenderness  Breasts: Breasts are symmetrical          Right: No inverted nipple, mass, nipple discharge, skin change or tenderness           Left: No inverted nipple, mass, nipple discharge, skin change or tenderness  Abdominal:      General: There is no distension  Palpations: Abdomen is soft  There is no hepatomegaly, splenomegaly or mass  Tenderness: There is no abdominal tenderness  There is no guarding or rebound  Genitourinary:     Labia:         Right: No rash, tenderness, lesion or injury  Left: No rash, tenderness, lesion or injury  Vagina: Normal  No foreign body  No vaginal discharge, erythema, tenderness or bleeding  Cervix: No cervical motion tenderness, discharge or friability  Adnexa:         Right: No mass, tenderness or fullness  Left: No mass, tenderness or fullness  Rectum: Normal    Musculoskeletal: Normal range of motion  Lymphadenopathy:      Cervical: No cervical adenopathy  Skin:     General: Skin is warm and dry  Findings: No rash  Nails: There is no clubbing  Neurological:      Mental Status: She is alert and oriented to person, place, and time  Cranial Nerves: No cranial nerve deficit  Psychiatric:         Speech: Speech normal          Behavior: Behavior normal          Thought Content:  Thought content normal          Judgment: Judgment normal

## 2021-02-10 NOTE — ASSESSMENT & PLAN NOTE
H/o DM2, managed by PCP  The patient reports labs were normal last year and she was advised to d/c metformin  Recommended checking current ha1c and order slip was provided for this  Reviewed importance of glycemic control in the context of future pregnancy

## 2021-04-27 DIAGNOSIS — Z01.419 ENCOUNTER FOR GYNECOLOGICAL EXAMINATION WITHOUT ABNORMAL FINDING: ICD-10-CM

## 2021-04-28 ENCOUNTER — TELEPHONE (OUTPATIENT)
Dept: OBGYN CLINIC | Facility: CLINIC | Age: 31
End: 2021-04-28

## 2021-04-28 RX ORDER — DROSPIRENONE AND ETHINYL ESTRADIOL 0.03MG-3MG
KIT ORAL
Qty: 28 TABLET | Refills: 0 | Status: SHIPPED | OUTPATIENT
Start: 2021-04-28 | End: 2021-05-24

## 2021-04-28 NOTE — TELEPHONE ENCOUNTER
Per my 2/2021 note she was to f/u with her PCP to rule out hypertension, as her BP was elevated at her annual visit  She was given 3 packs at that time   Please contact the patient and inquire as to whether she saw them for further eval

## 2021-04-28 NOTE — TELEPHONE ENCOUNTER
Sure   Please reiterate the importance of r/o cHTN   Use of estrogen containing product in the context of cHTN could increase risk of stroke or heart attack

## 2021-05-02 PROBLEM — J45.20 MILD INTERMITTENT ASTHMA WITHOUT COMPLICATION: Status: ACTIVE | Noted: 2021-05-02

## 2021-05-02 PROBLEM — E78.1 HYPERTRIGLYCERIDEMIA: Status: ACTIVE | Noted: 2021-05-02

## 2021-05-06 ENCOUNTER — OFFICE VISIT (OUTPATIENT)
Dept: INTERNAL MEDICINE CLINIC | Facility: CLINIC | Age: 31
End: 2021-05-06
Payer: COMMERCIAL

## 2021-05-06 VITALS
DIASTOLIC BLOOD PRESSURE: 88 MMHG | HEART RATE: 88 BPM | BODY MASS INDEX: 30.7 KG/M2 | WEIGHT: 191 LBS | TEMPERATURE: 98.7 F | OXYGEN SATURATION: 99 % | SYSTOLIC BLOOD PRESSURE: 132 MMHG | HEIGHT: 66 IN

## 2021-05-06 DIAGNOSIS — E78.1 HYPERTRIGLYCERIDEMIA: ICD-10-CM

## 2021-05-06 DIAGNOSIS — Z00.00 ANNUAL PHYSICAL EXAM: Primary | ICD-10-CM

## 2021-05-06 DIAGNOSIS — E11.9 TYPE 2 DIABETES MELLITUS WITHOUT COMPLICATION, WITHOUT LONG-TERM CURRENT USE OF INSULIN (HCC): ICD-10-CM

## 2021-05-06 DIAGNOSIS — J30.2 SEASONAL ALLERGIC RHINITIS, UNSPECIFIED TRIGGER: ICD-10-CM

## 2021-05-06 DIAGNOSIS — U07.1 COVID-19: ICD-10-CM

## 2021-05-06 PROBLEM — J30.9 RHINITIS, ALLERGIC: Status: ACTIVE | Noted: 2021-05-06

## 2021-05-06 PROCEDURE — 3725F SCREEN DEPRESSION PERFORMED: CPT | Performed by: INTERNAL MEDICINE

## 2021-05-06 PROCEDURE — 99213 OFFICE O/P EST LOW 20 MIN: CPT | Performed by: INTERNAL MEDICINE

## 2021-05-06 PROCEDURE — 99395 PREV VISIT EST AGE 18-39: CPT | Performed by: INTERNAL MEDICINE

## 2021-05-06 RX ORDER — OMEGA-3S/DHA/EPA/FISH OIL/D3 300MG-1000
400 CAPSULE ORAL DAILY
COMMUNITY

## 2021-05-06 RX ORDER — CETIRIZINE HYDROCHLORIDE 10 MG/1
1 TABLET ORAL DAILY PRN
COMMUNITY
End: 2021-08-24 | Stop reason: ALTCHOICE

## 2021-05-06 RX ORDER — ZINC GLUCONATE 50 MG
50 TABLET ORAL DAILY
COMMUNITY
End: 2021-08-24 | Stop reason: ALTCHOICE

## 2021-05-06 RX ORDER — CHLORAL HYDRATE 500 MG
2 CAPSULE ORAL SEE ADMIN INSTRUCTIONS
COMMUNITY

## 2021-05-06 RX ORDER — MULTIVIT-MIN/IRON FUM/FOLIC AC 7.5 MG-4
1 TABLET ORAL DAILY
COMMUNITY
End: 2022-07-14 | Stop reason: ALTCHOICE

## 2021-05-06 NOTE — PROGRESS NOTES
Assessment/Plan:    1  Annual physical exam    2  Type 2 diabetes mellitus without complication, without long-term current use of insulin ([facility] [address] )  Assessment & Plan:  Patient used to be on metformin and glimepiride for her diabetes mellitus  But she has stopped her medications since last 2 years  Last time I saw her at office was March 26, 2019 she has not been checking her blood sugar at home  So will order blood test and decide treatment accordingly  She has been watching diet and trying to lose weight  Advised for 1800 calorie diet he lost about 20 pound weight since I saw her last March 2019      Orders:  -     CBC and differential; Future  -     Comprehensive metabolic panel; Future  -     Hemoglobin A1C; Future  -     TSH, 3rd generation; Future  -     UA (URINE) with reflex to Scope  -     Microalbumin / creatinine urine ratio    3  Hypertriglyceridemia  Assessment & Plan:  Her cholesterol 162, triglycerides 277, LDL 76, HDL 50 on October 17, 2018  Patient has been watching diet for carbs and sugar and saturated fat  Pain to lose weight exercise  She has been exercising about 5 times per week  Orders:  -     Comprehensive metabolic panel; Future  -     Lipid panel; Future  -     TSH, 3rd generation; Future    4  Seasonal allergic rhinitis, unspecified trigger  Assessment & Plan:  Her symptoms are well controlled on Zyrtec      5  BMI 30 0-30 9,adult    6  COVID-19  Assessment & Plan:  She had a COVID-19 infection January 2021 but she recovered well without any residual effect  BMI Counseling: Body mass index is 30 83 kg/m²  The BMI is above normal  Nutrition recommendations include decreasing portion sizes, decreasing fast food intake, consuming healthier snacks, limiting drinks that contain sugar, moderation in carbohydrate intake, reducing intake of saturated and trans fat and reducing intake of cholesterol   Exercise recommendations include exercising 3-5 times per week and strength training exercises  No pharmacotherapy was ordered  Subjective:  Patient presents for annual wellness exam as well as follow-up of her medical problems  Patient ID: Ran Gordillo is a 31 y o  female  HPI   68-year-old white female patient presents for annual wellness exam as well as follow-up of her medical problems  She denies chest pain, shortness with, pain abdomen  Denies cough, fever, chills denies nausea, vomiting, diarrhea  She had a COVID-19 infection January 2021 she has pretty much recovered completely after her COVID-19 infection  She was out of work for 2 weeks during infection but since then she has been working at [employer]  The following portions of the patient's history were reviewed and updated as appropriate:     Past Medical History:  She has a past medical history of Asthma, BMI 30 0-30 9,adult (5/6/2021), COVID-19 (5/6/2021), Diabetes mellitus ([facility] [address] ), DM2 (diabetes mellitus, type 2) ([facility] ), Elevated cortisol level, Hand pain, right, High triglycerides, Hypertriglyceridemia (5/2/2021), Mild intermittent asthma without complication (5/6/5094), Obesity, Rhinitis, allergic (5/6/2021), Sinobronchitis, and Type 2 diabetes mellitus ([facility] [address] )  ,  _______________________________________________________________________  Past Surgical History:   has a past surgical history that includes Cholecystectomy and [place] tooth extraction  ,  _______________________________________________________________________  Family History:  family history includes COPD in her father; Diabetes in her mother; Fibromyalgia in her father; Heart attack in her maternal grandfather and maternal grandmother; Heart disease in her maternal grandfather and maternal grandmother; Hypertension in her mother; No Known Problems in her brother and paternal grandmother; Prostate cancer in her paternal grandfather; Stroke in her maternal grandmother ,  _______________________________________________________________________  Social History:   reports that she has never smoked  She has never used smokeless tobacco  She reports current alcohol use  She reports that she does not use drugs  ,  _______________________________________________________________________  Allergies:  is allergic to latex     _______________________________________________________________________  Current Outpatient Medications   Medication Sig Dispense Refill    cetirizine (ZyrTEC) 10 mg tablet Take 1 tablet by mouth daily as needed      cholecalciferol (VITAMIN D3) 400 units tablet Take 400 Units by mouth daily      drospirenone-ethinyl estradiol (MARY) 3-0 03 MG per tablet TAKE 1 TABLET BY MOUTH EVERY DAY 28 tablet 0    Multiple Vitamins-Minerals (multivitamin with minerals) tablet Take 1 tablet by mouth daily      Omega-3 Fatty Acids (fish oil) 1,000 mg Take 1,000 mg by mouth daily      zinc gluconate 50 mg tablet Take 50 mg by mouth daily       No current facility-administered medications for this visit       _______________________________________________________________________  Review of Systems   Constitutional: Negative for chills and fever  HENT: Negative for congestion, ear pain, hearing loss, nosebleeds, sinus pain, sore throat and trouble swallowing  Eyes: Negative for discharge, redness and visual disturbance  Respiratory: Negative for cough, chest tightness and shortness of breath  Cardiovascular: Negative for chest pain and palpitations  Gastrointestinal: Negative for abdominal pain, blood in stool, constipation, diarrhea, nausea and vomiting  Genitourinary: Negative for dysuria, flank pain, frequency and hematuria  Musculoskeletal: Negative for arthralgias, myalgias and neck pain  Skin: Negative for color change and rash  Neurological: Negative for dizziness, speech difficulty, weakness and headaches     Hematological: Does not bruise/bleed easily  Psychiatric/Behavioral: Negative for agitation and behavioral problems  Objective:  Vitals:    05/06/21 1451   BP: 132/88   BP Location: Right arm   Patient Position: Sitting   Cuff Size: Adult   Pulse: 88   Temp: 98 7 °F (37 1 °C)   TempSrc: Tympanic   SpO2: 99%   Weight: 86 6 kg (191 lb)   Height: 5' 6" (1 676 m)     Body mass index is 30 83 kg/m²  Physical Exam  Vitals signs and nursing note reviewed  Constitutional:       General: She is not in acute distress  Appearance: She is obese  HENT:      Head: Normocephalic and atraumatic  Right Ear: Tympanic membrane, ear canal and external ear normal       Left Ear: Tympanic membrane, ear canal and external ear normal       Nose: Nose normal       Mouth/Throat:      Comments: Patient has a face mask on  Eyes:      General: No scleral icterus  Extraocular Movements: Extraocular movements intact  Conjunctiva/sclera: Conjunctivae normal    Neck:      Musculoskeletal: Normal range of motion and neck supple  No muscular tenderness  Cardiovascular:      Rate and Rhythm: Normal rate and regular rhythm  Pulses: Normal pulses  no weak pulses          Dorsalis pedis pulses are 2+ on the right side  Posterior tibial pulses are 2+ on the right side  Heart sounds: No murmur  Pulmonary:      Effort: Pulmonary effort is normal       Breath sounds: Normal breath sounds  Abdominal:      General: Bowel sounds are normal       Palpations: Abdomen is soft  Tenderness: There is no abdominal tenderness  Musculoskeletal: Normal range of motion  Right lower leg: No edema  Left lower leg: No edema  Feet:      Right foot:      Skin integrity: No ulcer, skin breakdown, erythema, warmth, callus or dry skin  Left foot:      Skin integrity: No ulcer, skin breakdown, erythema, warmth, callus or dry skin  Skin:     General: Skin is warm  Findings: No rash     Neurological:      General: No focal deficit present  Mental Status: She is alert and oriented to person, place, and time  Psychiatric:         Mood and Affect: Mood normal          Behavior: Behavior normal        Patient's shoes and socks removed  Right Foot/Ankle   Right Foot Inspection  Skin Exam: skin normal and skin intact no dry skin, no warmth, no callus, no erythema, no maceration, no abnormal color, no pre-ulcer, no ulcer and no callus                            Sensory   Vibration: intact  Proprioception: intact   Monofilament testing: intact  Vascular    The right DP pulse is 2+  The right PT pulse is 2+  Left Foot/Ankle  Left Foot Inspection  Skin Exam: skin normal and skin intactno dry skin, no warmth, no erythema, no maceration, normal color, no pre-ulcer, no ulcer and no callus                                         Sensory   Vibration: intact  Proprioception: intact  Monofilament: intact    Assign Risk Category:  No deformity present; No loss of protective sensation;  No weak pulses       Risk: 0

## 2021-05-06 NOTE — ASSESSMENT & PLAN NOTE
Patient used to be on metformin and glimepiride for her diabetes mellitus  But she has stopped her medications since last 2 years  Last time I saw her at office was March 26, 2019 she has not been checking her blood sugar at home  So will order blood test and decide treatment accordingly  She has been watching diet and trying to lose weight    Advised for 1800 calorie diet he lost about 20 pound weight since I saw her last March 2019

## 2021-05-06 NOTE — PATIENT INSTRUCTIONS

## 2021-05-06 NOTE — PROGRESS NOTES
[facility] INTERNAL MEDICINE    NAME: Justa Bey  AGE: 31 y o  SEX: female  : 1990     DATE: 2021     Assessment and Plan:     Problem List Items Addressed This Visit        Endocrine    Type 2 diabetes mellitus without complication ([address] )       Respiratory    Rhinitis, allergic       Other    Hypertriglyceridemia    BMI 30 0-30 9,adult      Other Visit Diagnoses     Annual physical exam    -  Primary          Immunizations and preventive care screenings were discussed with patient today  Appropriate education was printed on patient's after visit summary  Counseling:  · Alcohol/drug use: discussed moderation in alcohol intake, the recommendations for healthy alcohol use, and avoidance of illicit drug use  No follow-ups on file  Chief Complaint:     Chief Complaint   Patient presents with    Annual Exam     joint pain since covid infection in 2021      History of Present Illness:     Adult Annual Physical   Patient here for a comprehensive physical exam  The patient reports no problems  Diet and Physical Activity  · Diet/Nutrition: well balanced diet  · Exercise: 5-7 times a week on average  Depression Screening  PHQ-9 Depression Screening    PHQ-9:   Frequency of the following problems over the past two weeks:      Little interest or pleasure in doing things: 0 - not at all  Feeling down, depressed, or hopeless: 0 - not at all  PHQ-2 Score: 0       General Health  · Sleep: sleeps well  · Hearing: significantly decreased - bilateral   · Vision: wears glasses  · Dental: regular dental visits  /GYN Health  · Last menstrual period: 5/3/25261  · Contraceptive method: oral contraceptives  · History of STDs?: no      Review of Systems:     Review of Systems   Constitutional: Negative for chills and fever     HENT: Negative for congestion, ear pain, hearing loss, nosebleeds, sinus pain, sore throat and trouble swallowing  Eyes: Negative for discharge, redness and visual disturbance  Respiratory: Negative for cough, chest tightness and shortness of breath  Cardiovascular: Negative for chest pain and palpitations  Gastrointestinal: Negative for abdominal pain, blood in stool, constipation, diarrhea, nausea and vomiting  Genitourinary: Negative for dysuria, flank pain, frequency and hematuria  Musculoskeletal: Negative for arthralgias, myalgias and neck pain  Skin: Negative for color change and rash  Neurological: Negative for dizziness, speech difficulty, weakness and headaches  Hematological: Does not bruise/bleed easily  Psychiatric/Behavioral: Negative for agitation and behavioral problems           Past Medical History:     Past Medical History:   Diagnosis Date    Asthma     BMI 30 0-30 9,adult 5/6/2021    Diabetes mellitus ([place] [address] )     DM2 [facility]di[facility] 2) ([facility]     Elevated cortisol level     Hand pain, right     High triglycerides     Hypertriglyceridemia 5/2/2021    Mild intermittent asthma without complication 7/0/2129    Obesity     Rhinitis, allergic 5/6/2021    Sinobronchitis     Type 2 diabetes mellitus ([place] [address] )       Past Surgical History:     Past Surgical History:   Procedure Laterality Date    CHOLECYSTECTOMY      WISDOM TOOTH EXTRACTION        Social History:        Social History     Socioeconomic History    Marital status: /Civil Union     Spouse name: None    Number of children: None    Years of education: None    Highest education level: None   Occupational History    None   Social Needs    Financial resource strain: None    Food insecurity     Worry: None     Inability: None    Transportation needs     Medical: None     Non-medical: None   Tobacco Use    Smoking status: Never Smoker    Smokeless tobacco: Never Used   Substance and Sexual Activity    Alcohol use: Yes     Comment: occasionally    Drug use: No    Sexual activity: Yes     Partners: Male     Birth control/protection: OCP   Lifestyle    Physical activity     Days per week: None     Minutes per session: None    Stress: None   Relationships    Social connections     Talks on phone: None     Gets together: None     Attends [employer] service: None     Active member of club or organization: None     Attends meetings of clubs or organizations: None     Relationship status: None    Intimate partner violence     Fear of current or ex partner: None     Emotionally abused: None     Physically abused: None     Forced sexual activity: None   Other Topics Concern    None   Social History Narrative    Lives with     Annual eye exam: Advise to see ophthalmology    Pap smear by her gyn    - As per [employer]      Family History:     Family History   Problem Relation Age of Onset    Diabetes Mother     Hypertension Mother     COPD Father     Fibromyalgia Father     No Known Problems Brother     Heart disease Maternal Grandmother     Stroke Maternal Grandmother     Heart attack Maternal Grandmother     Heart disease Maternal Grandfather     Heart attack Maternal Grandfather     No Known Problems Paternal Grandmother     Prostate cancer Paternal Grandfather       Current Medications:     Current Outpatient Medications   Medication Sig Dispense Refill    cetirizine (ZyrTEC) 10 mg tablet Take 1 tablet by mouth daily as needed      cholecalciferol (VITAMIN D3) 400 units tablet Take 400 Units by mouth daily      drospirenone-ethinyl estradiol (MARY) 3-0 03 MG per tablet TAKE 1 TABLET BY MOUTH EVERY DAY 28 tablet 0    Multiple Vitamins-Minerals (multivitamin with minerals) tablet Take 1 tablet by mouth daily      Omega-3 Fatty Acids (fish oil) 1,000 mg Take 1,000 mg by mouth daily      zinc gluconate 50 mg tablet Take 50 mg by mouth daily       No current facility-administered medications for this visit  Allergies:      Allergies   Allergen Reactions    Latex       Physical Exam:     /88 (BP Location: Right arm, Patient Position: Sitting, Cuff Size: Adult)   Pulse (!) 116   Temp 98 7 °F (37 1 °C) (Tympanic)   Ht 5' 6" (1 676 m)   Wt 86 6 kg (191 lb)   SpO2 99%   BMI 30 83 kg/m²     Physical Exam  Vitals signs and nursing note reviewed  Constitutional:       General: She is not in acute distress  Appearance: She is well-developed  She is obese  HENT:      Head: Normocephalic and atraumatic  Right Ear: Tympanic membrane, ear canal and external ear normal       Left Ear: Tympanic membrane, ear canal and external ear normal       Mouth/Throat:      Comments: Pt  Has face mask on  Eyes:      General: No scleral icterus  Right eye: No discharge  Left eye: No discharge  Extraocular Movements: Extraocular movements intact  Conjunctiva/sclera: Conjunctivae normal       Pupils: Pupils are equal, round, and reactive to light  Neck:      Musculoskeletal: Normal range of motion and neck supple  Cardiovascular:      Rate and Rhythm: Normal rate and regular rhythm  Pulses: Normal pulses  Heart sounds: Normal heart sounds  No murmur  Pulmonary:      Effort: Pulmonary effort is normal  No respiratory distress  Breath sounds: Normal breath sounds  Abdominal:      General: Bowel sounds are normal       Palpations: Abdomen is soft  Tenderness: There is no abdominal tenderness  Musculoskeletal: Normal range of motion  Skin:     General: Skin is warm and dry  Neurological:      General: No focal deficit present  Mental Status: She is alert and oriented to person, place, and time     Psychiatric:         Mood and Affect: Mood normal          Behavior: Behavior normal             Tomeka Gongora MD   [address] [place] [address]

## 2021-05-06 NOTE — ASSESSMENT & PLAN NOTE
Her cholesterol 162, triglycerides 277, LDL 76, HDL 50 on October 17, 2018  Patient has been watching diet for carbs and sugar and saturated fat  Pain to lose weight exercise  She has been exercising about 5 times per week

## 2021-05-13 ENCOUNTER — TELEPHONE (OUTPATIENT)
Dept: OBGYN CLINIC | Facility: CLINIC | Age: 31
End: 2021-05-13

## 2021-05-13 ENCOUNTER — APPOINTMENT (OUTPATIENT)
Dept: LAB | Facility: CLINIC | Age: 31
End: 2021-05-13
Payer: COMMERCIAL

## 2021-05-13 ENCOUNTER — TRANSCRIBE ORDERS (OUTPATIENT)
Dept: LAB | Facility: CLINIC | Age: 31
End: 2021-05-13

## 2021-05-13 DIAGNOSIS — E78.1 HYPERTRIGLYCERIDEMIA: ICD-10-CM

## 2021-05-13 DIAGNOSIS — E11.9 TYPE 2 DIABETES MELLITUS WITHOUT COMPLICATION, WITHOUT LONG-TERM CURRENT USE OF INSULIN (HCC): ICD-10-CM

## 2021-05-13 LAB
ALBUMIN SERPL BCP-MCNC: 3.3 G/DL (ref 3.5–5)
ALP SERPL-CCNC: 57 U/L (ref 46–116)
ALT SERPL W P-5'-P-CCNC: 25 U/L (ref 12–78)
ANION GAP SERPL CALCULATED.3IONS-SCNC: 7 MMOL/L (ref 4–13)
AST SERPL W P-5'-P-CCNC: 20 U/L (ref 5–45)
BACTERIA UR QL AUTO: ABNORMAL /HPF
BASOPHILS # BLD AUTO: 0.02 THOUSANDS/ΜL (ref 0–0.1)
BASOPHILS NFR BLD AUTO: 0 % (ref 0–1)
BILIRUB SERPL-MCNC: 0.72 MG/DL (ref 0.2–1)
BILIRUB UR QL STRIP: NEGATIVE
BUN SERPL-MCNC: 7 MG/DL (ref 5–25)
CALCIUM ALBUM COR SERPL-MCNC: 9.6 MG/DL (ref 8.3–10.1)
CALCIUM SERPL-MCNC: 9 MG/DL (ref 8.3–10.1)
CHLORIDE SERPL-SCNC: 105 MMOL/L (ref 100–108)
CHOLEST SERPL-MCNC: 174 MG/DL (ref 50–200)
CLARITY UR: ABNORMAL
CO2 SERPL-SCNC: 26 MMOL/L (ref 21–32)
COLOR UR: ABNORMAL
CREAT SERPL-MCNC: 0.53 MG/DL (ref 0.6–1.3)
CREAT UR-MCNC: 179 MG/DL
EOSINOPHIL # BLD AUTO: 0.09 THOUSAND/ΜL (ref 0–0.61)
EOSINOPHIL NFR BLD AUTO: 1 % (ref 0–6)
ERYTHROCYTE [DISTWIDTH] IN BLOOD BY AUTOMATED COUNT: 11.8 % (ref 11.6–15.1)
EST. AVERAGE GLUCOSE BLD GHB EST-MCNC: 203 MG/DL
GFR SERPL CREATININE-BSD FRML MDRD: 130 ML/MIN/1.73SQ M
GLUCOSE P FAST SERPL-MCNC: 208 MG/DL (ref 65–99)
GLUCOSE UR STRIP-MCNC: ABNORMAL MG/DL
HBA1C MFR BLD: 8.7 %
HCT VFR BLD AUTO: 44.4 % (ref 34.8–46.1)
HDLC SERPL-MCNC: 51 MG/DL
HGB BLD-MCNC: 15.1 G/DL (ref 11.5–15.4)
HGB UR QL STRIP.AUTO: NEGATIVE
HYALINE CASTS #/AREA URNS LPF: ABNORMAL /LPF
IMM GRANULOCYTES # BLD AUTO: 0.01 THOUSAND/UL (ref 0–0.2)
IMM GRANULOCYTES NFR BLD AUTO: 0 % (ref 0–2)
KETONES UR STRIP-MCNC: ABNORMAL MG/DL
LDLC SERPL CALC-MCNC: 67 MG/DL (ref 0–100)
LEUKOCYTE ESTERASE UR QL STRIP: NEGATIVE
LYMPHOCYTES # BLD AUTO: 2.91 THOUSANDS/ΜL (ref 0.6–4.47)
LYMPHOCYTES NFR BLD AUTO: 38 % (ref 14–44)
MCH RBC QN AUTO: 30.7 PG (ref 26.8–34.3)
MCHC RBC AUTO-ENTMCNC: 34 G/DL (ref 31.4–37.4)
MCV RBC AUTO: 90 FL (ref 82–98)
MICROALBUMIN UR-MCNC: 68.1 MG/L (ref 0–20)
MICROALBUMIN/CREAT 24H UR: 38 MG/G CREATININE (ref 0–30)
MONOCYTES # BLD AUTO: 0.45 THOUSAND/ΜL (ref 0.17–1.22)
MONOCYTES NFR BLD AUTO: 6 % (ref 4–12)
NEUTROPHILS # BLD AUTO: 4.09 THOUSANDS/ΜL (ref 1.85–7.62)
NEUTS SEG NFR BLD AUTO: 55 % (ref 43–75)
NITRITE UR QL STRIP: NEGATIVE
NON-SQ EPI CELLS URNS QL MICRO: ABNORMAL /HPF
NONHDLC SERPL-MCNC: 123 MG/DL
NRBC BLD AUTO-RTO: 0 /100 WBCS
PH UR STRIP.AUTO: 6 [PH]
PLATELET # BLD AUTO: 373 THOUSANDS/UL (ref 149–390)
PMV BLD AUTO: 9.7 FL (ref 8.9–12.7)
POTASSIUM SERPL-SCNC: 3.7 MMOL/L (ref 3.5–5.3)
PROT SERPL-MCNC: 7 G/DL (ref 6.4–8.2)
PROT UR STRIP-MCNC: ABNORMAL MG/DL
RBC # BLD AUTO: 4.92 MILLION/UL (ref 3.81–5.12)
RBC #/AREA URNS AUTO: ABNORMAL /HPF
SODIUM SERPL-SCNC: 138 MMOL/L (ref 136–145)
SP GR UR STRIP.AUTO: 1.02 (ref 1–1.03)
TRIGL SERPL-MCNC: 280 MG/DL
TSH SERPL DL<=0.05 MIU/L-ACNC: 1.12 UIU/ML (ref 0.36–3.74)
UROBILINOGEN UR QL STRIP.AUTO: 0.2 E.U./DL
WBC # BLD AUTO: 7.57 THOUSAND/UL (ref 4.31–10.16)
WBC #/AREA URNS AUTO: ABNORMAL /HPF

## 2021-05-13 PROCEDURE — 3060F POS MICROALBUMINURIA REV: CPT | Performed by: INTERNAL MEDICINE

## 2021-05-13 PROCEDURE — 82570 ASSAY OF URINE CREATININE: CPT | Performed by: INTERNAL MEDICINE

## 2021-05-13 PROCEDURE — 81001 URINALYSIS AUTO W/SCOPE: CPT | Performed by: INTERNAL MEDICINE

## 2021-05-13 PROCEDURE — 3052F HG A1C>EQUAL 8.0%<EQUAL 9.0%: CPT | Performed by: INTERNAL MEDICINE

## 2021-05-13 PROCEDURE — 80053 COMPREHEN METABOLIC PANEL: CPT

## 2021-05-13 PROCEDURE — 36415 COLL VENOUS BLD VENIPUNCTURE: CPT

## 2021-05-13 PROCEDURE — 84443 ASSAY THYROID STIM HORMONE: CPT

## 2021-05-13 PROCEDURE — 80061 LIPID PANEL: CPT

## 2021-05-13 PROCEDURE — 82043 UR ALBUMIN QUANTITATIVE: CPT | Performed by: INTERNAL MEDICINE

## 2021-05-13 PROCEDURE — 85025 COMPLETE CBC W/AUTO DIFF WBC: CPT

## 2021-05-13 PROCEDURE — 83036 HEMOGLOBIN GLYCOSYLATED A1C: CPT

## 2021-05-13 NOTE — TELEPHONE ENCOUNTER
It looks like her BP was slightly elevated there but they did not discuss work up to rule out cHTN   Does she have access to a BP cuff?  If so she can check BP daily for 2 weeks and keep a log, then schedule a f/u visit with me to review and discuss plan  If she does not have a cuff please direct her obtaining one and advise that her insurance co may cover this

## 2021-05-14 ENCOUNTER — TELEPHONE (OUTPATIENT)
Dept: OBGYN CLINIC | Facility: CLINIC | Age: 31
End: 2021-05-14

## 2021-05-18 ENCOUNTER — OFFICE VISIT (OUTPATIENT)
Dept: INTERNAL MEDICINE CLINIC | Facility: CLINIC | Age: 31
End: 2021-05-18
Payer: COMMERCIAL

## 2021-05-18 VITALS
WEIGHT: 189 LBS | OXYGEN SATURATION: 100 % | BODY MASS INDEX: 29.66 KG/M2 | TEMPERATURE: 98.4 F | HEIGHT: 67 IN | SYSTOLIC BLOOD PRESSURE: 130 MMHG | DIASTOLIC BLOOD PRESSURE: 84 MMHG | HEART RATE: 92 BPM

## 2021-05-18 DIAGNOSIS — R80.9 MICROALBUMINURIA: ICD-10-CM

## 2021-05-18 DIAGNOSIS — E78.1 HYPERTRIGLYCERIDEMIA: ICD-10-CM

## 2021-05-18 DIAGNOSIS — E11.9 TYPE 2 DIABETES MELLITUS WITHOUT COMPLICATION, WITHOUT LONG-TERM CURRENT USE OF INSULIN (HCC): Primary | ICD-10-CM

## 2021-05-18 PROCEDURE — 1036F TOBACCO NON-USER: CPT | Performed by: INTERNAL MEDICINE

## 2021-05-18 PROCEDURE — 3066F NEPHROPATHY DOC TX: CPT | Performed by: INTERNAL MEDICINE

## 2021-05-18 PROCEDURE — 3008F BODY MASS INDEX DOCD: CPT | Performed by: INTERNAL MEDICINE

## 2021-05-18 PROCEDURE — 99213 OFFICE O/P EST LOW 20 MIN: CPT | Performed by: INTERNAL MEDICINE

## 2021-05-18 RX ORDER — GLIMEPIRIDE 2 MG/1
2 TABLET ORAL
Qty: 90 TABLET | Refills: 1 | Status: SHIPPED | OUTPATIENT
Start: 2021-05-18 | End: 2021-11-15

## 2021-05-18 RX ORDER — METFORMIN HYDROCHLORIDE 750 MG/1
1500 TABLET, EXTENDED RELEASE ORAL
Qty: 180 TABLET | Refills: 1 | Status: SHIPPED | OUTPATIENT
Start: 2021-05-18 | End: 2021-11-15

## 2021-05-18 NOTE — PROGRESS NOTES
Assessment/Plan:    1  Type 2 diabetes mellitus without complication, without long-term current use of insulin ([facility] [address] )  Assessment & Plan:  Her diabetes mellitus is uncontrolled  Her hemoglobin A1c is 8  7now, her hemoglobin A1c was 8 6 in 2018 then after she took medication it came down to 6 2 in 2019  But after that she stopped taking medication and also stopped following with me as well  Discussed with the patient consequences of uncontrolled diabetes mellitus may cause  many complications  She will now regularly take her medications and she has been trying to watch diet and lose weight  Will start her on metformin and glimepiride which she has taken in the past without any side effect and it was effective as well  She would rather prefer to take a generic medication for now  She is not planning to be pregnant  Lab Results   Component Value Date    HGBA1C 8 7 (H) 05/13/2021       Orders:  -     metFORMIN (GLUCOPHAGE-XR) 750 mg 24 hr tablet; Take 2 tablets (1,500 mg total) by mouth daily before dinner  -     glimepiride (AMARYL) 2 mg tablet; Take 1 tablet (2 mg total) by mouth daily with breakfast  -     Comprehensive metabolic panel; Future  -     Hemoglobin A1C; Future    2  Hypertriglyceridemia  Assessment & Plan:  Her cholesterol 174 triglyceride 280 LDL 67 and HDL 51  Discussed with the patient to start statin therapy due to diabetes mellitus  But she would like to wait  She will consider increasing her fish oil from 1 tablet b i d  to she will take 1 in the morning and 2 tablets at night for hyper triglyceridemia  Advised for low-cholesterol low saturated fat diet  Orders:  -     Lipid panel; Future    3  Microalbuminuria  Assessment & Plan:  Discussed with the patient due to microalbuminuria  Will need to start Ace or Arb medication but she would like to also wait to start any other medication except she will consider going back on her diabetes medications and will continue fish oil        4  BMI 29 0-29 9,adult  Assessment & Plan:  Patient states he had joined [employer]  and now she is seriously exercising and watching diet to lose weight            Subjective:  Patient presents for follow-up      Patient ID: Khushbu Orellana is a 31 y o  female  HPI     79-year-old white female patient presents for follow-up her medical problems  She denies any chest pain, shortness with, pain abdomen  She denies any cough, fever, chills  She denies any nausea, vomiting, diarrhea  The following portions of the patient's history were reviewed and updated as appropriate:     Past Medical History:  She has a past medical history of Asthma, BMI 29 0-29 9,adult (5/18/2021), BMI 30 0-30 9,adult (5/6/2021), COVID-19 (5/6/2021), Diabetes mellitus ([facility] [address] ), DM2 (diabetes mellitus, type 2) ([facility] ), Elevated cortisol level, Hand pain, right, High triglycerides, Hypertriglyceridemia (5/2/2021), Microalbuminuria (5/18/2021), Mild intermittent asthma without complication (4/1/4182), Obesity, Rhinitis, allergic (5/6/2021), Sinobronchitis, and Type 2 diabetes mellitus ([facility] [address] )  ,  _______________________________________________________________________  Past Surgical History:   has a past surgical history that includes Cholecystectomy and [place] tooth extraction  ,  _______________________________________________________________________  Family History:  family history includes COPD in her father; Diabetes in her mother; Fibromyalgia in her father; Heart attack in her maternal grandfather and maternal grandmother; Heart disease in her maternal grandfather and maternal grandmother; Hypertension in her mother; No Known Problems in her brother and paternal grandmother; Prostate cancer in her paternal grandfather; Stroke in her maternal grandmother ,  _______________________________________________________________________  Social History:   reports that she has never smoked   She has never used smokeless tobacco  She reports current alcohol use  She reports that she does not use drugs  ,  _______________________________________________________________________  Allergies:  is allergic to latex     _______________________________________________________________________  Current Outpatient Medications   Medication Sig Dispense Refill    cetirizine (ZyrTEC) 10 mg tablet Take 1 tablet by mouth daily as needed      cholecalciferol (VITAMIN D3) 400 units tablet Take 400 Units by mouth daily      Cranberry 125 MG TABS Take by mouth      drospirenone-ethinyl estradiol (MARY) 3-0 03 MG per tablet TAKE 1 TABLET BY MOUTH EVERY DAY 28 tablet 0    Multiple Vitamins-Minerals (multivitamin with minerals) tablet Take 1 tablet by mouth daily      Omega-3 Fatty Acids (fish oil) 1,000 mg Take 2 capsules by mouth daily      zinc gluconate 50 mg tablet Take 50 mg by mouth daily      glimepiride (AMARYL) 2 mg tablet Take 1 tablet (2 mg total) by mouth daily with breakfast 90 tablet 1    metFORMIN (GLUCOPHAGE-XR) 750 mg 24 hr tablet Take 2 tablets (1,500 mg total) by mouth daily before dinner 180 tablet 1     No current facility-administered medications for this visit       _______________________________________________________________________  Review of Systems   Constitutional: Negative for chills, fatigue and fever  HENT: Negative for congestion, ear pain, hearing loss, nosebleeds, sinus pain, sore throat and trouble swallowing  Eyes: Negative for discharge, redness and visual disturbance  Respiratory: Negative for cough, chest tightness and shortness of breath  Cardiovascular: Negative for chest pain and palpitations  Gastrointestinal: Negative for abdominal pain, blood in stool, constipation, diarrhea, nausea and vomiting  Genitourinary: Negative for dysuria, flank pain, frequency and hematuria  Musculoskeletal: Negative for arthralgias, myalgias and neck pain  Skin: Negative for color change and rash     Neurological: Negative for dizziness, speech difficulty, weakness and headaches  Hematological: Does not bruise/bleed easily  Psychiatric/Behavioral: Negative for agitation and behavioral problems  Objective:  Vitals:    05/18/21 1056   BP: 130/84   BP Location: Right arm   Patient Position: Sitting   Cuff Size: Adult   Pulse: 92   Temp: 98 4 °F (36 9 °C)   TempSrc: Tympanic   SpO2: 100%   Weight: 85 7 kg (189 lb)   Height: 5' 7" (1 702 m)     Body mass index is 29 6 kg/m²  Physical Exam  Vitals signs and nursing note reviewed  Constitutional:       General: She is not in acute distress  Appearance: Normal appearance  HENT:      Head: Normocephalic and atraumatic  Right Ear: Ear canal and external ear normal       Left Ear: Ear canal and external ear normal       Mouth/Throat:      Comments: She has a face mask on  Eyes:      General: No scleral icterus  Extraocular Movements: Extraocular movements intact  Conjunctiva/sclera: Conjunctivae normal       Pupils: Pupils are equal, round, and reactive to light  Neck:      Musculoskeletal: Normal range of motion and neck supple  No muscular tenderness  Cardiovascular:      Rate and Rhythm: Normal rate and regular rhythm  Pulses: Normal pulses  Heart sounds: No murmur  Pulmonary:      Effort: Pulmonary effort is normal       Breath sounds: Normal breath sounds  Abdominal:      General: Bowel sounds are normal       Palpations: Abdomen is soft  Tenderness: There is no abdominal tenderness  Musculoskeletal: Normal range of motion  Right lower leg: No edema  Left lower leg: No edema  Skin:     General: Skin is warm  Findings: No rash  Neurological:      General: No focal deficit present  Mental Status: She is alert and oriented to person, place, and time     Psychiatric:         Mood and Affect: Mood normal          Behavior: Behavior normal

## 2021-05-18 NOTE — PATIENT INSTRUCTIONS
Patient advised to continue present medications discussed with the patient medications and laboratory data in detail  Follow-up with me in 3 months    If any blood test was ordered please do 1 week prior to next appointment  If you have any questions please call the office [phone]

## 2021-05-19 NOTE — ASSESSMENT & PLAN NOTE
Discussed with the patient due to microalbuminuria  Will need to start Ace or Arb medication but she would like to also wait to start any other medication except she will consider going back on her diabetes medications and will continue fish oil

## 2021-05-19 NOTE — ASSESSMENT & PLAN NOTE
Her cholesterol 174 triglyceride 280 LDL 67 and HDL 51  Discussed with the patient to start statin therapy due to diabetes mellitus  But she would like to wait  She will consider increasing her fish oil from 1 tablet b i d  to she will take 1 in the morning and 2 tablets at night for hyper triglyceridemia  Advised for low-cholesterol low saturated fat diet

## 2021-05-19 NOTE — ASSESSMENT & PLAN NOTE
Her diabetes mellitus is uncontrolled  Her hemoglobin A1c is 8  7now, her hemoglobin A1c was 8 6 in 2018 then after she took medication it came down to 6 2 in 2019  But after that she stopped taking medication and also stopped following with me as well  Discussed with the patient consequences of uncontrolled diabetes mellitus may cause  many complications  She will now regularly take her medications and she has been trying to watch diet and lose weight  Will start her on metformin and glimepiride which she has taken in the past without any side effect and it was effective as well  She would rather prefer to take a generic medication for now    She is not planning to be pregnant  Lab Results   Component Value Date    HGBA1C 8 7 (H) 05/13/2021

## 2021-05-19 NOTE — ASSESSMENT & PLAN NOTE
Patient states he had joined the [employer]  and now she is seriously exercising and watching diet to lose weight

## 2021-05-22 DIAGNOSIS — Z01.419 ENCOUNTER FOR GYNECOLOGICAL EXAMINATION WITHOUT ABNORMAL FINDING: ICD-10-CM

## 2021-05-24 RX ORDER — DROSPIRENONE AND ETHINYL ESTRADIOL 0.03MG-3MG
KIT ORAL
Qty: 28 TABLET | Refills: 0 | Status: SHIPPED | OUTPATIENT
Start: 2021-05-24 | End: 2021-07-13 | Stop reason: SDUPTHER

## 2021-06-28 DIAGNOSIS — Z01.419 ENCOUNTER FOR GYNECOLOGICAL EXAMINATION WITHOUT ABNORMAL FINDING: ICD-10-CM

## 2021-06-28 RX ORDER — DROSPIRENONE AND ETHINYL ESTRADIOL 0.03MG-3MG
1 KIT ORAL DAILY
Qty: 28 TABLET | Refills: 0 | OUTPATIENT
Start: 2021-06-28

## 2021-06-28 NOTE — TELEPHONE ENCOUNTER
She can see any available provider and can use condoms or abstain in the meantime   This is not a new issue, this has been going on since I saw her in February

## 2021-06-28 NOTE — TELEPHONE ENCOUNTER
There is no discussion of her elevated BP in that note or the note from 5/6/21 at the PCP office  In order to rule her out for chronic hypertension they will likely need to get her a cuff and have her monitor BP and keep a log  Unless they document that she does not have cHTN it would be unwise to rx her any further estrogen containing products (especially given that she also has type 2 DM and hyperlipidemia), given that this could put her at high risk for stroke or heart attack  If she is interested in just discussing estrogen free options for contraception and plans to forgo seeing her PCP for this that's ok, she just needs to schedule an office visit to review alternatives

## 2021-07-13 ENCOUNTER — OFFICE VISIT (OUTPATIENT)
Dept: OBGYN CLINIC | Facility: CLINIC | Age: 31
End: 2021-07-13
Payer: COMMERCIAL

## 2021-07-13 VITALS — DIASTOLIC BLOOD PRESSURE: 80 MMHG | SYSTOLIC BLOOD PRESSURE: 132 MMHG | WEIGHT: 193.8 LBS | BODY MASS INDEX: 30.35 KG/M2

## 2021-07-13 DIAGNOSIS — Z31.69 ENCOUNTER FOR PRECONCEPTION CONSULTATION: ICD-10-CM

## 2021-07-13 DIAGNOSIS — Z01.419 ENCOUNTER FOR GYNECOLOGICAL EXAMINATION WITHOUT ABNORMAL FINDING: ICD-10-CM

## 2021-07-13 DIAGNOSIS — R03.0 WHITE COAT SYNDROME WITHOUT DIAGNOSIS OF HYPERTENSION: Primary | ICD-10-CM

## 2021-07-13 PROCEDURE — 99214 OFFICE O/P EST MOD 30 MIN: CPT | Performed by: NURSE PRACTITIONER

## 2021-07-13 PROCEDURE — 1036F TOBACCO NON-USER: CPT | Performed by: NURSE PRACTITIONER

## 2021-07-13 RX ORDER — DROSPIRENONE AND ETHINYL ESTRADIOL 0.03MG-3MG
1 KIT ORAL DAILY
Qty: 90 TABLET | Refills: 1 | Status: SHIPPED | OUTPATIENT
Start: 2021-07-13 | End: 2022-05-16

## 2021-07-13 NOTE — ASSESSMENT & PLAN NOTE
Discussed preconception consideration  Recommended maintaining healthy BMI and reviewed diet/activity recommendations  Advised daily PNV to be started at least 3 mos prior to actively trying to conceive for prevention of ONTD and prevention of morning sickness  Reviewed teratogen avoidance, timed intercourse, reasons to call  Discussed OB management of pregestational diabetes  Recommending continuing metformin and glimepiride for now and to notify her PCP of her plans to conceive  Discussed safety data - limited data and glimepiride not typically used in OB management  Advised that she will likely be GDMA2 with insulin control  Stressed the importance of compliance for maternal and fetal health  F/u after 6 mos of actively TTC to trouble shoot if conception has not yet occurred, or sooner as needed  The patient agrees with plan

## 2021-07-13 NOTE — PROGRESS NOTES
Assessment/Plan:    White coat syndrome without diagnosis of hypertension  BP on arrival is 132/80 and has been normotensive at home, while on and off ALECIA  Lynsey reports PCP does not feel she meets criteria for cHTN but will closely monitor, and she has f/u scheduled at his office in August    Given normotensive Bps, her strong desire to restart ALECIA and her plans for short term use of this med, the benefits of resuming use of Mary likely outweigh the risks  We did discuss [facility] for patients with cHTN, and she understands that estrogen use would be contraindicated if she does eventually rule in for this diagnosis in the future  Rx was sent and she will restart with next menses  Encounter for preconception consultation  Discussed preconception consideration  Recommended maintaining healthy BMI and reviewed diet/activity recommendations  Advised daily PNV to be started at least 3 mos prior to actively trying to conceive for prevention of ONTD and prevention of morning sickness  Reviewed teratogen avoidance, timed intercourse, reasons to call  Discussed OB management of pregestational diabetes  Recommending continuing metformin and glimepiride for now and to notify her PCP of her plans to conceive  Discussed safety data - limited data and glimepiride not typically used in OB management  Advised that she will likely be GDMA2 with insulin control  Stressed the importance of compliance for maternal and fetal health  F/u after 6 mos of actively TTC to trouble shoot if conception has not yet occurred, or sooner as needed  The patient agrees with plan  Diagnoses and all orders for this visit:    White coat syndrome without diagnosis of hypertension    Encounter for gynecological examination without abnormal finding  -     drospirenone-ethinyl estradiol (MARY) 3-0 03 MG per tablet;  Take 1 tablet by mouth daily    Encounter for preconception consultation          Subjective:      Patient ID: Lynsey CHUNG Mary Lou Joseph is a 31 y o  female  This patient presents to discuss use of ALECIA   Previously on Ashley and liked this   This med was d/c'd about one month ago due to recent elevated Bps  She was seen by PCP for this and also for management of DM2   She was normotensive at PCP office and states he told her she did not meet criteria for dx of cHTN  He believes she has white coat HTN   Her Bps at home have been 120s/70s, both on and off OCP    5/13/21 a1c: 8 7%  She was started on metformin XR and glimepiride  F/u with PCP and repeat labs in August   She is planning on TTC in the next 3-4 months and would like to restart OCP until then  She denies acute gyn complaints      The following portions of the patient's history were reviewed and updated as appropriate: allergies, current medications, past family history, past medical history, past social history, past surgical history and problem list     Review of Systems   Constitutional: Negative  Respiratory: Negative  Cardiovascular: Negative  Gastrointestinal: Negative  Genitourinary: Negative  Musculoskeletal: Negative  Skin: Negative  Neurological: Negative  Psychiatric/Behavioral: Negative  Objective:      /80 (BP Location: Right arm, Patient Position: Sitting, Cuff Size: Standard)   Wt 87 9 kg (193 lb 12 8 oz)   LMP 06/25/2021 (Exact Date)   BMI 30 35 kg/m²          Physical Exam  Constitutional:       Appearance: She is well-developed  HENT:      Head: Normocephalic  Eyes:      Pupils: Pupils are equal, round, and reactive to light  Pulmonary:      Effort: Pulmonary effort is normal    Musculoskeletal:      Cervical back: Normal range of motion  Neurological:      Mental Status: She is alert and oriented to person, place, and time  Psychiatric:         Behavior: Behavior normal          Thought Content:  Thought content normal          Judgment: Judgment normal

## 2021-07-13 NOTE — ASSESSMENT & PLAN NOTE
BP on arrival is 132/80 and has been normotensive at home, while on and off ALECIA  Neetaagusto Wray reports PCP does not feel she meets criteria for cHTN but will closely monitor, and she has f/u scheduled at his office in August    Given normotensive Bps, her strong desire to restart ALECIA and her plans for short term use of this med, the benefits of resuming use of Ashley likely outweigh the risks  We did discuss [facility] for patients with cHTN, and she understands that estrogen use would be contraindicated if she does eventually rule in for this diagnosis in the future  Rx was sent and she will restart with next menses

## 2021-08-18 ENCOUNTER — APPOINTMENT (OUTPATIENT)
Dept: LAB | Facility: CLINIC | Age: 31
End: 2021-08-18
Payer: COMMERCIAL

## 2021-08-18 DIAGNOSIS — E78.1 HYPERTRIGLYCERIDEMIA: ICD-10-CM

## 2021-08-18 DIAGNOSIS — E11.9 TYPE 2 DIABETES MELLITUS WITHOUT COMPLICATION, WITHOUT LONG-TERM CURRENT USE OF INSULIN (HCC): ICD-10-CM

## 2021-08-18 LAB
ALBUMIN SERPL BCP-MCNC: 3.5 G/DL (ref 3.5–5)
ALP SERPL-CCNC: 46 U/L (ref 46–116)
ALT SERPL W P-5'-P-CCNC: 24 U/L (ref 12–78)
ANION GAP SERPL CALCULATED.3IONS-SCNC: 3 MMOL/L (ref 4–13)
AST SERPL W P-5'-P-CCNC: 17 U/L (ref 5–45)
BILIRUB SERPL-MCNC: 0.6 MG/DL (ref 0.2–1)
BUN SERPL-MCNC: 8 MG/DL (ref 5–25)
CALCIUM SERPL-MCNC: 9.1 MG/DL (ref 8.3–10.1)
CHLORIDE SERPL-SCNC: 106 MMOL/L (ref 100–108)
CHOLEST SERPL-MCNC: 180 MG/DL (ref 50–200)
CO2 SERPL-SCNC: 27 MMOL/L (ref 21–32)
CREAT SERPL-MCNC: 0.56 MG/DL (ref 0.6–1.3)
EST. AVERAGE GLUCOSE BLD GHB EST-MCNC: 126 MG/DL
GFR SERPL CREATININE-BSD FRML MDRD: 127 ML/MIN/1.73SQ M
GLUCOSE P FAST SERPL-MCNC: 107 MG/DL (ref 65–99)
HBA1C MFR BLD: 6 %
HDLC SERPL-MCNC: 57 MG/DL
LDLC SERPL CALC-MCNC: 88 MG/DL (ref 0–100)
NONHDLC SERPL-MCNC: 123 MG/DL
POTASSIUM SERPL-SCNC: 3.8 MMOL/L (ref 3.5–5.3)
PROT SERPL-MCNC: 7.4 G/DL (ref 6.4–8.2)
SODIUM SERPL-SCNC: 136 MMOL/L (ref 136–145)
TRIGL SERPL-MCNC: 176 MG/DL

## 2021-08-18 PROCEDURE — 83036 HEMOGLOBIN GLYCOSYLATED A1C: CPT

## 2021-08-18 PROCEDURE — 3044F HG A1C LEVEL LT 7.0%: CPT | Performed by: INTERNAL MEDICINE

## 2021-08-18 PROCEDURE — 80061 LIPID PANEL: CPT

## 2021-08-18 PROCEDURE — 80053 COMPREHEN METABOLIC PANEL: CPT

## 2021-08-18 PROCEDURE — 36415 COLL VENOUS BLD VENIPUNCTURE: CPT

## 2021-08-24 ENCOUNTER — OFFICE VISIT (OUTPATIENT)
Dept: INTERNAL MEDICINE CLINIC | Facility: CLINIC | Age: 31
End: 2021-08-24
Payer: COMMERCIAL

## 2021-08-24 VITALS
DIASTOLIC BLOOD PRESSURE: 88 MMHG | WEIGHT: 197 LBS | HEART RATE: 92 BPM | BODY MASS INDEX: 31.66 KG/M2 | TEMPERATURE: 97.9 F | HEIGHT: 66 IN | OXYGEN SATURATION: 99 % | SYSTOLIC BLOOD PRESSURE: 126 MMHG

## 2021-08-24 DIAGNOSIS — E11.9 TYPE 2 DIABETES MELLITUS WITHOUT COMPLICATION, WITHOUT LONG-TERM CURRENT USE OF INSULIN (HCC): Primary | ICD-10-CM

## 2021-08-24 DIAGNOSIS — E78.1 HYPERTRIGLYCERIDEMIA: ICD-10-CM

## 2021-08-24 DIAGNOSIS — R80.9 MICROALBUMINURIA: ICD-10-CM

## 2021-08-24 PROCEDURE — 3066F NEPHROPATHY DOC TX: CPT | Performed by: INTERNAL MEDICINE

## 2021-08-24 PROCEDURE — 3074F SYST BP LT 130 MM HG: CPT | Performed by: INTERNAL MEDICINE

## 2021-08-24 PROCEDURE — 99213 OFFICE O/P EST LOW 20 MIN: CPT | Performed by: INTERNAL MEDICINE

## 2021-08-24 PROCEDURE — 1036F TOBACCO NON-USER: CPT | Performed by: INTERNAL MEDICINE

## 2021-08-24 PROCEDURE — 3079F DIAST BP 80-89 MM HG: CPT | Performed by: INTERNAL MEDICINE

## 2021-08-24 PROCEDURE — 3008F BODY MASS INDEX DOCD: CPT | Performed by: INTERNAL MEDICINE

## 2021-08-24 NOTE — PATIENT INSTRUCTIONS
Patient advised to continue present medications discussed with the patient medications and laboratory data in detail  Follow-up with me in 3 months    If any blood test was ordered please do 1 week prior to next appointment  If you have any questions please call the office [phone]

## 2021-08-24 NOTE — PROGRESS NOTES
Assessment/Plan:    1  Type 2 diabetes mellitus without complication, without long-term current use of insulin ([facility])  Assessment & Plan:    Lab Results   Component Value Date    HGBA1C 6 0 (H) 08/18/2021    significantly  Improved  Hemoglobin A1c decreased from 8 7-6 0  Sometime her blood sugar goes down in 70s afternoon and see gets hypoglycemia symptoms  So advised to discontinue glimepiride and monitor blood sugar  check blood sugar a m  p m  alternate day  Call if less than 80  Continue present medication metformin and 1800 calorie ADA diet  Orders:  -     Basic metabolic panel; Future  -     Hemoglobin A1C; Future    2  Hypertriglyceridemia  Assessment & Plan:    Improving  Cholesterol increased from 174-180 but triglyceride decreased from 280-176 HDL 57  And LDL 88  Patient may be planning for to be pregnant so will avoid any statin therapy at present  Patient takes fish oil 1 in the morning and 2 at night she will try 2 tablet b i d   Advised to  Lose weight and low-cholesterol low saturated fat diet  Orders:  -     Lipid panel; Future    3  Microalbuminuria  Assessment & Plan:     patient planning to be pregnant will avoid any other medication at present  Discussed with the patient will need  Ace or Arb medication  in the future  4  BMI 31 0-31 9,adult  Assessment & Plan:    Advised to decrease portion size  Advised to decrease her cholesterol, carb ,sugar  Advised to exercise 3-5 times per week  She was advised to get COVID-19 vaccination  Subjective:   Patient presents for follow-up  Patient ID: Anita Cade is a 31 y o  female  HPI     31-year-old white female patient presents for  Follow-up of her medical problems  She denies any chest pain, shortness of breath, pain abdomen  Denies any cough, fever, chills  Denies any nausea vomiting, diarrhea      The following portions of the patient's history were reviewed and updated as appropriate:     Past Medical History:  She has a past medical history of Asthma, BMI 29 0-29 9,adult (5/18/2021), BMI 30 0-30 9,adult (5/6/2021), BMI 31 0-31 9,adult (8/24/2021), COVID-19 (5/6/2021), Diabetes mellitus ([facility] ), DM2 (diabetes mellitus, type 2) ([facility] ), Elevated cortisol level, Hand pain, right, High triglycerides, Hypertriglyceridemia (5/2/2021), Microalbuminuria (5/18/2021), Mild intermittent asthma without complication (5/7/5872), Obesity, Rhinitis, allergic (5/6/2021), Sinobronchitis, and Type 2 diabetes mellitus ([facility] )  ,  _______________________________________________________________________  Past Surgical History:   has a past surgical history that includes Cholecystectomy and [place] tooth extraction  ,  _______________________________________________________________________  Family History:  family history includes COPD in her father; Diabetes in her mother; Fibromyalgia in her father; Heart attack in her maternal grandfather and maternal grandmother; Heart disease in her maternal grandfather and maternal grandmother; Hypertension in her mother; No Known Problems in her brother and paternal grandmother; Prostate cancer in her paternal grandfather; Stroke in her maternal grandmother ,  _______________________________________________________________________  Social History:   reports that she has never smoked  She has never used smokeless tobacco  She reports current alcohol use  She reports that she does not use drugs  ,  _______________________________________________________________________  Allergies:  is allergic to latex     _______________________________________________________________________  Current Outpatient Medications   Medication Sig Dispense Refill    cholecalciferol (VITAMIN D3) 400 units tablet Take 400 Units by mouth daily      Cranberry 125 MG TABS Take by mouth      drospirenone-ethinyl estradiol (MARY) 3-0 03 MG per tablet Take 1 tablet by mouth daily 90 tablet 1    glimepiride (AMARYL) 2 mg tablet Take 1 tablet (2 mg total) by mouth daily with breakfast 90 tablet 1    metFORMIN (GLUCOPHAGE-XR) 750 mg 24 hr tablet Take 2 tablets (1,500 mg total) by mouth daily before dinner 180 tablet 1    Multiple Vitamins-Minerals (multivitamin with minerals) tablet Take 1 tablet by mouth daily      Omega-3 Fatty Acids (fish oil) 1,000 mg Take 3 capsules by mouth see administration instructions 1qam,2qpm       No current facility-administered medications for this visit      _______________________________________________________________________  Review of Systems   Constitutional: Negative for chills, fatigue and fever  HENT: Negative for congestion, ear pain, hearing loss, nosebleeds, sinus pain, sore throat and trouble swallowing  Eyes: Negative for discharge, redness and visual disturbance  Respiratory: Negative for cough, chest tightness and shortness of breath  Cardiovascular: Negative for chest pain and palpitations  Gastrointestinal: Negative for abdominal pain, blood in stool, constipation, diarrhea, nausea and vomiting  Genitourinary: Negative for dysuria, flank pain, frequency and hematuria  Musculoskeletal: Negative for arthralgias, myalgias and neck pain  Skin: Negative for color change and rash  Neurological: Negative for dizziness, speech difficulty, weakness, numbness and headaches  Hematological: Does not bruise/bleed easily  Psychiatric/Behavioral: Negative for agitation and behavioral problems  Objective:  Vitals:    08/24/21 1024   BP: 126/88   BP Location: Right arm   Patient Position: Sitting   Cuff Size: Adult   Pulse: 92   Temp: 97 9 °F (36 6 °C)   TempSrc: Tympanic   SpO2: 99%   Weight: 89 4 kg (197 lb)   Height: 5' 6" (1 676 m)     Body mass index is 31 8 kg/m²  Physical Exam  Vitals and nursing note reviewed  Constitutional:       General: She is not in acute distress  Appearance: Normal appearance  She is obese     HENT:      Head: Normocephalic and atraumatic  Right Ear: Ear canal and external ear normal       Left Ear: Ear canal and external ear normal       Mouth/Throat:      Comments: Patient has a face mask on  Eyes:      General: No scleral icterus  Right eye: No discharge  Left eye: No discharge  Extraocular Movements: Extraocular movements intact  Conjunctiva/sclera: Conjunctivae normal    Cardiovascular:      Rate and Rhythm: Normal rate and regular rhythm  Pulses: Normal pulses  Heart sounds: No murmur heard  Pulmonary:      Effort: Pulmonary effort is normal       Breath sounds: Normal breath sounds  Abdominal:      General: Bowel sounds are normal       Palpations: Abdomen is soft  Tenderness: There is no abdominal tenderness  Musculoskeletal:         General: Normal range of motion  Cervical back: Normal range of motion and neck supple  No muscular tenderness  Right lower leg: No edema  Left lower leg: No edema  Skin:     General: Skin is warm  Findings: No rash  Neurological:      General: No focal deficit present  Mental Status: She is alert and oriented to person, place, and time  Sensory: No sensory deficit  Motor: No weakness        Gait: Gait normal    Psychiatric:         Mood and Affect: Mood normal          Behavior: Behavior normal

## 2021-08-25 NOTE — ASSESSMENT & PLAN NOTE
Advised to decrease portion size  Advised to decrease her cholesterol, carb ,sugar  Advised to exercise 3-5 times per week

## 2021-08-25 NOTE — ASSESSMENT & PLAN NOTE
Improving  Cholesterol increased from 174-180 but triglyceride decreased from 280-176 HDL 57  And LDL 88  Patient may be planning for to be pregnant so will avoid any statin therapy at present  Patient takes fish oil 1 in the morning and 2 at night she will try 2 tablet b i d   Advised to  Lose weight and low-cholesterol low saturated fat diet

## 2021-08-25 NOTE — ASSESSMENT & PLAN NOTE
Lab Results   Component Value Date    HGBA1C 6 0 (H) 08/18/2021    significantly  Improved  Hemoglobin A1c decreased from 8 7-6 0  Sometime her blood sugar goes down in 70s afternoon and see gets hypoglycemia symptoms  So advised to discontinue glimepiride and monitor blood sugar  check blood sugar a m  p m  alternate day  Call if less than 80  Continue present medication metformin and 1800 calorie ADA diet

## 2021-08-25 NOTE — ASSESSMENT & PLAN NOTE
patient planning to be pregnant will avoid any other medication at present  Discussed with the patient will need  Ace or Arb medication  in the future

## 2021-11-12 DIAGNOSIS — E11.9 TYPE 2 DIABETES MELLITUS WITHOUT COMPLICATION, WITHOUT LONG-TERM CURRENT USE OF INSULIN (HCC): ICD-10-CM

## 2021-11-13 DIAGNOSIS — E11.9 TYPE 2 DIABETES MELLITUS WITHOUT COMPLICATION, WITHOUT LONG-TERM CURRENT USE OF INSULIN (HCC): ICD-10-CM

## 2021-11-15 RX ORDER — METFORMIN HYDROCHLORIDE 750 MG/1
1500 TABLET, EXTENDED RELEASE ORAL
Qty: 180 TABLET | Refills: 1 | Status: SHIPPED | OUTPATIENT
Start: 2021-11-15 | End: 2021-12-14

## 2021-11-15 RX ORDER — GLIMEPIRIDE 2 MG/1
TABLET ORAL
Qty: 90 TABLET | Refills: 1 | Status: SHIPPED | OUTPATIENT
Start: 2021-11-15 | End: 2021-12-14 | Stop reason: ALTCHOICE

## 2021-12-09 ENCOUNTER — APPOINTMENT (OUTPATIENT)
Dept: LAB | Facility: CLINIC | Age: 31
End: 2021-12-09
Payer: COMMERCIAL

## 2021-12-09 DIAGNOSIS — E11.9 TYPE 2 DIABETES MELLITUS WITHOUT COMPLICATION, WITHOUT LONG-TERM CURRENT USE OF INSULIN (HCC): ICD-10-CM

## 2021-12-09 DIAGNOSIS — E78.1 HYPERTRIGLYCERIDEMIA: ICD-10-CM

## 2021-12-09 LAB
ANION GAP SERPL CALCULATED.3IONS-SCNC: 6 MMOL/L (ref 4–13)
BUN SERPL-MCNC: 8 MG/DL (ref 5–25)
CALCIUM SERPL-MCNC: 9 MG/DL (ref 8.3–10.1)
CHLORIDE SERPL-SCNC: 103 MMOL/L (ref 100–108)
CHOLEST SERPL-MCNC: 189 MG/DL
CO2 SERPL-SCNC: 27 MMOL/L (ref 21–32)
CREAT SERPL-MCNC: 0.58 MG/DL (ref 0.6–1.3)
EST. AVERAGE GLUCOSE BLD GHB EST-MCNC: 160 MG/DL
GFR SERPL CREATININE-BSD FRML MDRD: 125 ML/MIN/1.73SQ M
GLUCOSE P FAST SERPL-MCNC: 137 MG/DL (ref 65–99)
HBA1C MFR BLD: 7.2 %
HDLC SERPL-MCNC: 62 MG/DL
LDLC SERPL CALC-MCNC: 96 MG/DL (ref 0–100)
NONHDLC SERPL-MCNC: 127 MG/DL
POTASSIUM SERPL-SCNC: 3.8 MMOL/L (ref 3.5–5.3)
SODIUM SERPL-SCNC: 136 MMOL/L (ref 136–145)
TRIGL SERPL-MCNC: 155 MG/DL

## 2021-12-09 PROCEDURE — 3051F HG A1C>EQUAL 7.0%<8.0%: CPT | Performed by: INTERNAL MEDICINE

## 2021-12-09 PROCEDURE — 83036 HEMOGLOBIN GLYCOSYLATED A1C: CPT

## 2021-12-09 PROCEDURE — 80048 BASIC METABOLIC PNL TOTAL CA: CPT

## 2021-12-09 PROCEDURE — 80061 LIPID PANEL: CPT

## 2021-12-09 PROCEDURE — 36415 COLL VENOUS BLD VENIPUNCTURE: CPT

## 2021-12-14 ENCOUNTER — OFFICE VISIT (OUTPATIENT)
Dept: INTERNAL MEDICINE CLINIC | Facility: CLINIC | Age: 31
End: 2021-12-14
Payer: COMMERCIAL

## 2021-12-14 VITALS
TEMPERATURE: 98.3 F | DIASTOLIC BLOOD PRESSURE: 80 MMHG | WEIGHT: 205 LBS | HEART RATE: 103 BPM | SYSTOLIC BLOOD PRESSURE: 124 MMHG | OXYGEN SATURATION: 98 % | BODY MASS INDEX: 33.09 KG/M2

## 2021-12-14 DIAGNOSIS — J30.2 SEASONAL ALLERGIC RHINITIS, UNSPECIFIED TRIGGER: ICD-10-CM

## 2021-12-14 DIAGNOSIS — E78.1 HYPERTRIGLYCERIDEMIA: ICD-10-CM

## 2021-12-14 DIAGNOSIS — K59.00 CONSTIPATION, UNSPECIFIED CONSTIPATION TYPE: ICD-10-CM

## 2021-12-14 DIAGNOSIS — E11.9 TYPE 2 DIABETES MELLITUS WITHOUT COMPLICATION, WITHOUT LONG-TERM CURRENT USE OF INSULIN (HCC): Primary | ICD-10-CM

## 2021-12-14 DIAGNOSIS — R80.9 MICROALBUMINURIA: ICD-10-CM

## 2021-12-14 DIAGNOSIS — J45.20 MILD INTERMITTENT ASTHMA WITHOUT COMPLICATION: ICD-10-CM

## 2021-12-14 PROCEDURE — 99214 OFFICE O/P EST MOD 30 MIN: CPT | Performed by: INTERNAL MEDICINE

## 2021-12-14 PROCEDURE — 3079F DIAST BP 80-89 MM HG: CPT | Performed by: INTERNAL MEDICINE

## 2021-12-14 PROCEDURE — 3074F SYST BP LT 130 MM HG: CPT | Performed by: INTERNAL MEDICINE

## 2021-12-14 PROCEDURE — 1036F TOBACCO NON-USER: CPT | Performed by: INTERNAL MEDICINE

## 2021-12-14 PROCEDURE — 3066F NEPHROPATHY DOC TX: CPT | Performed by: INTERNAL MEDICINE

## 2021-12-14 RX ORDER — METFORMIN HYDROCHLORIDE 500 MG/1
2000 TABLET, EXTENDED RELEASE ORAL
Qty: 360 TABLET | Refills: 1 | Status: SHIPPED | OUTPATIENT
Start: 2021-12-14 | End: 2022-03-31 | Stop reason: SDUPTHER

## 2022-01-03 DIAGNOSIS — Z01.419 ENCOUNTER FOR GYNECOLOGICAL EXAMINATION WITHOUT ABNORMAL FINDING: ICD-10-CM

## 2022-02-09 ENCOUNTER — TELEPHONE (OUTPATIENT)
Dept: OBGYN CLINIC | Facility: CLINIC | Age: 32
End: 2022-02-09

## 2022-02-09 RX ORDER — DROSPIRENONE AND ETHINYL ESTRADIOL 0.03MG-3MG
KIT ORAL
Qty: 84 TABLET | Refills: 1 | OUTPATIENT
Start: 2022-02-09

## 2022-03-24 ENCOUNTER — APPOINTMENT (OUTPATIENT)
Dept: LAB | Facility: CLINIC | Age: 32
End: 2022-03-24
Payer: COMMERCIAL

## 2022-03-24 DIAGNOSIS — E78.1 HYPERTRIGLYCERIDEMIA: ICD-10-CM

## 2022-03-24 DIAGNOSIS — E11.9 TYPE 2 DIABETES MELLITUS WITHOUT COMPLICATION, WITHOUT LONG-TERM CURRENT USE OF INSULIN (HCC): ICD-10-CM

## 2022-03-24 DIAGNOSIS — K59.00 CONSTIPATION, UNSPECIFIED CONSTIPATION TYPE: ICD-10-CM

## 2022-03-24 LAB
ALBUMIN SERPL BCP-MCNC: 3.9 G/DL (ref 3.5–5)
ALP SERPL-CCNC: 74 U/L (ref 46–116)
ALT SERPL W P-5'-P-CCNC: 45 U/L (ref 12–78)
ANION GAP SERPL CALCULATED.3IONS-SCNC: 6 MMOL/L (ref 4–13)
AST SERPL W P-5'-P-CCNC: 20 U/L (ref 5–45)
BILIRUB SERPL-MCNC: 0.79 MG/DL (ref 0.2–1)
BUN SERPL-MCNC: 8 MG/DL (ref 5–25)
CALCIUM SERPL-MCNC: 9.7 MG/DL (ref 8.3–10.1)
CHLORIDE SERPL-SCNC: 103 MMOL/L (ref 100–108)
CO2 SERPL-SCNC: 29 MMOL/L (ref 21–32)
CREAT SERPL-MCNC: 0.56 MG/DL (ref 0.6–1.3)
EST. AVERAGE GLUCOSE BLD GHB EST-MCNC: 177 MG/DL
GFR SERPL CREATININE-BSD FRML MDRD: 126 ML/MIN/1.73SQ M
GLUCOSE P FAST SERPL-MCNC: 168 MG/DL (ref 65–99)
HBA1C MFR BLD: 7.8 %
POTASSIUM SERPL-SCNC: 4.1 MMOL/L (ref 3.5–5.3)
PROT SERPL-MCNC: 7.7 G/DL (ref 6.4–8.2)
SODIUM SERPL-SCNC: 138 MMOL/L (ref 136–145)
TSH SERPL DL<=0.05 MIU/L-ACNC: 0.85 UIU/ML (ref 0.36–3.74)

## 2022-03-24 PROCEDURE — 80053 COMPREHEN METABOLIC PANEL: CPT

## 2022-03-24 PROCEDURE — 84443 ASSAY THYROID STIM HORMONE: CPT

## 2022-03-24 PROCEDURE — 83036 HEMOGLOBIN GLYCOSYLATED A1C: CPT

## 2022-03-24 PROCEDURE — 36415 COLL VENOUS BLD VENIPUNCTURE: CPT

## 2022-03-31 ENCOUNTER — OFFICE VISIT (OUTPATIENT)
Dept: INTERNAL MEDICINE CLINIC | Facility: CLINIC | Age: 32
End: 2022-03-31
Payer: COMMERCIAL

## 2022-03-31 VITALS
OXYGEN SATURATION: 99 % | SYSTOLIC BLOOD PRESSURE: 140 MMHG | WEIGHT: 208 LBS | BODY MASS INDEX: 33.57 KG/M2 | HEART RATE: 100 BPM | TEMPERATURE: 98.7 F | DIASTOLIC BLOOD PRESSURE: 88 MMHG

## 2022-03-31 DIAGNOSIS — R80.9 MICROALBUMINURIA: ICD-10-CM

## 2022-03-31 DIAGNOSIS — E11.9 TYPE 2 DIABETES MELLITUS WITHOUT COMPLICATION, WITHOUT LONG-TERM CURRENT USE OF INSULIN (HCC): Primary | ICD-10-CM

## 2022-03-31 DIAGNOSIS — R03.0 ELEVATED BLOOD PRESSURE READING IN OFFICE WITHOUT DIAGNOSIS OF HYPERTENSION: ICD-10-CM

## 2022-03-31 DIAGNOSIS — E78.1 HYPERTRIGLYCERIDEMIA: ICD-10-CM

## 2022-03-31 DIAGNOSIS — J30.2 SEASONAL ALLERGIC RHINITIS, UNSPECIFIED TRIGGER: ICD-10-CM

## 2022-03-31 PROCEDURE — 99214 OFFICE O/P EST MOD 30 MIN: CPT | Performed by: INTERNAL MEDICINE

## 2022-03-31 RX ORDER — METFORMIN HYDROCHLORIDE 500 MG/1
2000 TABLET, EXTENDED RELEASE ORAL
Qty: 360 TABLET | Refills: 1 | Status: SHIPPED | OUTPATIENT
Start: 2022-03-31

## 2022-03-31 RX ORDER — INSULIN GLARGINE 100 [IU]/ML
10 INJECTION, SOLUTION SUBCUTANEOUS
Qty: 15 ML | Refills: 0 | Status: SHIPPED | OUTPATIENT
Start: 2022-03-31 | End: 2022-04-04

## 2022-03-31 NOTE — PATIENT INSTRUCTIONS
Patient was advised to continue present medications  discussed with the patient medications and laboratory data in detail  Follow-up with me in 3 months or as advised  If any blood test was ordered please do 1 week prior to next appointment unless advise to get earlier    If you have any questions please call the office [phone]

## 2022-03-31 NOTE — ASSESSMENT & PLAN NOTE
Last cholesterol 189, triglyceride 282 to  174, HDL 62, LDL 96  She take 2 fish oil daily  Advised for low-cholesterol low saturated diet will follow lipid panel

## 2022-03-31 NOTE — ASSESSMENT & PLAN NOTE
Patient states whenever she goes to doctor's office her blood pressure and heart rate goes up  She has been checking her blood pressure and heart at home blood pressure around 115 / 70s and heart rate in 70s  Possible white coat syndrome  Will observe

## 2022-03-31 NOTE — PROGRESS NOTES
Assessment/Plan:    1  Type 2 diabetes mellitus without complication, without long-term current use of insulin [facility])  Assessment & Plan:    Lab Results   Component Value Date    HGBA1C 7 8 (H) 03/24/2022   Uncontrolled  Hemoglobin A1c increasing from 6 to 7 2 to 7 8 now despite patient has been watching diet, exercising, and on metformin 2gm once a day  She is planning to be pregnant  So will not add any more p o  Medication  Advised to start insulin patient agreed so started on Lantus 10 units q h s     Advised to check blood sugar b i d  At least a m  And p m  Call if blood sugar less than 80  Patient states her mother has a diabetes on insulin therapy so she will get help from her mom about giving in insulin injection  Will refer to endocrinologist       Orders:  -     insulin glargine (Lantus SoloStar) 100 units/mL injection pen; Inject 10 Units under the skin daily at bedtime  -     Insulin Pen Needle 32G X 4 MM MISC; Use in the morning Use 1 needle once daily  -     metFORMIN (GLUCOPHAGE-XR) 500 mg 24 hr tablet; Take 4 tablets (2,000 mg total) by mouth daily with dinner  -     Ambulatory Referral to Endocrinology; Future  -     Basic metabolic panel; Future  -     Lipid panel; Future  -     UA (URINE) with reflex to Scope  -     Microalbumin / creatinine urine ratio  -     Hemoglobin A1C; Future  -     CBC and differential; Future    2  Hypertriglyceridemia  Assessment & Plan:  Last cholesterol 189, triglyceride 282 to  174, HDL 62, LDL 96  She take 2 fish oil daily  Advised for low-cholesterol low saturated diet will follow lipid panel  Orders:  -     Lipid panel; Future    3  BMI 33 0-33 9,adult  Assessment & Plan:  Patient  was advised to decrease portion size  Advised to decrease carb, sugar, cholesterol intake  Advised to exercise 3-5 times per week  Advised to lose weight  4  Microalbuminuria  Assessment & Plan:   Will avoid adding ACE inhibitor or arbs due to patient is planning to be pregnant  Will observe  Orders:  -     Basic metabolic panel; Future  -     Microalbumin / creatinine urine ratio    5  Elevated blood pressure reading in office without diagnosis of hypertension  Assessment & Plan:  Patient states whenever she goes to doctor's office her blood pressure and heart rate goes up  She has been checking her blood pressure and heart at home blood pressure around 115 / 70s and heart rate in 70s  Possible white coat syndrome  Will observe  6  Seasonal allergic rhinitis, unspecified trigger  Assessment & Plan:  Only she gets symptoms during summertime does not take any medications            Subjective:  Patient presents for follow-up      Patient ID: Khushbu Orellana is a 36 y o  female  HPI   53-year-old white female patient presents for follow-up her medical problems  She denies any chest pain, shortness of breath, pain abdomen  Denies any cough, fever, chills  Denies any nausea vomiting, diarrhea  The following portions of the patient's history were reviewed and updated as appropriate:     Past Medical History:  She has a past medical history of Asthma, BMI 29 0-29 9,adult (5/18/2021), BMI 30 0-30 9,adult (5/6/2021), BMI 31 0-31 9,adult (8/24/2021), BMI 33 0-33 9,adult (12/14/2021), Constipation (12/14/2021), COVID-19 (5/6/2021), Diabetes mellitus ([facility] [address] ), DM[facility]di[facility] mellitus[facility] Elevated blood pressure reading in office without diagnosis of hypertension (3/31/2022), Elevated cortisol level, Hand pain, right, High triglycerides, Hypertriglyceridemia (5/2/2021), Microalbuminuria (5/18/2021), Mild intermittent asthma without complication (3/0/7312), Obesity, Rhinitis, allergic (5/6/2021), Sinobronchitis, and Type 2 diabetes mellitus ([facility] [address] )  ,  _______________________________________________________________________  Past Surgical History:   has a past surgical history that includes Cholecystectomy and [place] tooth extraction  ,  _______________________________________________________________________  Family History:  family history includes COPD in her father; Diabetes in her mother; Fibromyalgia in her father; Heart attack in her maternal grandfather and maternal grandmother; Heart disease in her maternal grandfather and maternal grandmother; Hypertension in her mother; No Known Problems in her brother and paternal grandmother; Prostate cancer in her paternal grandfather; Stroke in her maternal grandmother ,  _______________________________________________________________________  Social History:   reports that she has never smoked  She has never used smokeless tobacco  She reports current alcohol use  She reports that she does not use drugs  ,  _______________________________________________________________________  Allergies:  is allergic to latex     _______________________________________________________________________  Current Outpatient Medications   Medication Sig Dispense Refill    cholecalciferol (VITAMIN D3) 400 units tablet Take 400 Units by mouth daily      Cranberry 125 MG TABS Take by mouth      metFORMIN (GLUCOPHAGE-XR) 500 mg 24 hr tablet Take 4 tablets (2,000 mg total) by mouth daily with dinner 360 tablet 1    Multiple Vitamins-Minerals (multivitamin with minerals) tablet Take 1 tablet by mouth daily      Omega-3 Fatty Acids (fish oil) 1,000 mg Take 2 capsules by mouth see administration instructions 2 cap  qpm       drospirenone-ethinyl estradiol (MARY) 3-0 03 MG per tablet Take 1 tablet by mouth daily (Patient not taking: Reported on 3/31/2022 ) 90 tablet 1    insulin glargine (Lantus SoloStar) 100 units/mL injection pen Inject 10 Units under the skin daily at bedtime 15 mL 0    Insulin Pen Needle 32G X 4 MM MISC Use in the morning Use 1 needle once daily  100 each 0     No current facility-administered medications for this visit  _______________________________________________________________________  Review of Systems   Constitutional: Negative for chills, fatigue and fever  HENT: Negative for congestion, ear pain, hearing loss, nosebleeds, sinus pain, sore throat and trouble swallowing  Eyes: Negative for discharge, redness and visual disturbance  Respiratory: Negative for cough, chest tightness and shortness of breath  Cardiovascular: Negative for chest pain and palpitations  Gastrointestinal: Negative for abdominal pain, blood in stool, constipation, diarrhea, nausea and vomiting  Genitourinary: Negative for dysuria, flank pain, frequency and hematuria  Musculoskeletal: Negative for arthralgias, myalgias and neck pain  Skin: Negative for color change and rash  Neurological: Negative for dizziness, speech difficulty, weakness and headaches  Hematological: Does not bruise/bleed easily  Psychiatric/Behavioral: Negative for agitation and behavioral problems  Objective:  Vitals:    03/31/22 1359   BP: 140/88   BP Location: Left arm   Patient Position: Sitting   Cuff Size: Adult   Pulse: 100   Temp: 98 7 °F (37 1 °C)   TempSrc: Tympanic   SpO2: 99%   Weight: 94 3 kg (208 lb)     Body mass index is 33 57 kg/m²  Physical Exam  Vitals and nursing note reviewed  Constitutional:       General: She is not in acute distress  Appearance: Normal appearance  She is normal weight  HENT:      Head: Normocephalic and atraumatic  Right Ear: Ear canal and external ear normal       Left Ear: Ear canal and external ear normal       Nose: Nose normal       Comments: Patient has a face mask on     Mouth/Throat:      Mouth: Mucous membranes are moist       Comments: Patient has a face mask on  Eyes:      General: No scleral icterus  Right eye: No discharge  Left eye: No discharge  Extraocular Movements: Extraocular movements intact        Conjunctiva/sclera: Conjunctivae normal  Cardiovascular:      Rate and Rhythm: Normal rate and regular rhythm  Pulses: Normal pulses  Heart sounds: No murmur heard  Pulmonary:      Effort: Pulmonary effort is normal  No respiratory distress  Breath sounds: Normal breath sounds  Abdominal:      General: Bowel sounds are normal       Palpations: Abdomen is soft  Tenderness: There is no abdominal tenderness  Musculoskeletal:         General: Normal range of motion  Cervical back: Normal range of motion and neck supple  No muscular tenderness  Right lower leg: No edema  Left lower leg: No edema  Skin:     General: Skin is warm  Findings: No rash  Neurological:      General: No focal deficit present  Mental Status: She is alert and oriented to person, place, and time  Motor: No weakness  Coordination: Coordination normal    Psychiatric:         Mood and Affect: Mood normal          Behavior: Behavior normal        I spent 30 minutes with the patient today    More than 50% time spent for reviewing of external notes, reviewing of the results of diagnostics test, management of care, patient education and ordering of test

## 2022-03-31 NOTE — ASSESSMENT & PLAN NOTE
Lab Results   Component Value Date    HGBA1C 7 8 (H) 03/24/2022   Uncontrolled  Hemoglobin A1c increasing from 6 to 7 2 to 7 8 now despite patient has been watching diet, exercising, and on metformin 2gm once a day  She is planning to be pregnant  So will not add any more p o  Medication  Advised to start insulin patient agreed so started on Lantus 10 units q h s     Advised to check blood sugar b i d  At least a m  And p m  Call if blood sugar less than 80  Patient states her mother has a diabetes on insulin therapy so she will get help from her mom about giving in insulin injection    Will refer to endocrinologist

## 2022-04-04 DIAGNOSIS — E11.9 TYPE 2 DIABETES MELLITUS WITHOUT COMPLICATION, UNSPECIFIED WHETHER LONG TERM INSULIN USE (HCC): Primary | ICD-10-CM

## 2022-04-26 ENCOUNTER — TELEPHONE (OUTPATIENT)
Dept: INTERNAL MEDICINE CLINIC | Facility: CLINIC | Age: 32
End: 2022-04-26

## 2022-04-26 DIAGNOSIS — E11.9 TYPE 2 DIABETES MELLITUS WITHOUT COMPLICATION, WITH LONG-TERM CURRENT USE OF INSULIN (HCC): Primary | ICD-10-CM

## 2022-04-26 DIAGNOSIS — Z79.4 TYPE 2 DIABETES MELLITUS WITHOUT COMPLICATION, WITH LONG-TERM CURRENT USE OF INSULIN (HCC): Primary | ICD-10-CM

## 2022-04-26 NOTE — TELEPHONE ENCOUNTER
Discussed with the patient to change the site instead of legs ,give in the arms or abdomen area  If still itchiness persist to call me meanwhile Michelle Robles also schedule her to see endocrinologist as is planning to be pregnant

## 2022-04-26 NOTE — TELEPHONE ENCOUNTER
Patient is injecting 10 ml daily of her insulin and is complaining of itchiness on her legs x 2 weeks she is not having any shortness of breath  Does she need to be seen or change inslin  dose she is asking       [phone]

## 2022-05-16 ENCOUNTER — ULTRASOUND (OUTPATIENT)
Dept: OBGYN CLINIC | Facility: CLINIC | Age: 32
End: 2022-05-16
Payer: COMMERCIAL

## 2022-05-16 DIAGNOSIS — N91.2 AMENORRHEA: Primary | ICD-10-CM

## 2022-05-16 DIAGNOSIS — Z36.89 ESTABLISH GESTATIONAL AGE, ULTRASOUND: ICD-10-CM

## 2022-05-16 DIAGNOSIS — O24.319 PREEXISTING DIABETES COMPLICATING PREGNANCY, ANTEPARTUM: ICD-10-CM

## 2022-05-16 PROCEDURE — 1036F TOBACCO NON-USER: CPT | Performed by: CLINICAL NURSE SPECIALIST

## 2022-05-16 PROCEDURE — 76817 TRANSVAGINAL US OBSTETRIC: CPT | Performed by: CLINICAL NURSE SPECIALIST

## 2022-05-16 PROCEDURE — 99213 OFFICE O/P EST LOW 20 MIN: CPT | Performed by: CLINICAL NURSE SPECIALIST

## 2022-05-16 NOTE — PATIENT INSTRUCTIONS
Again, congratulations on your pregnancy! NEXT STEPS  Get Prenatal bloodwork if not already done  Call [facility] to schedule next ultrasound (done 12-13 wks)   Contact information for [facility] (AKA Maternal Fetal Medicine)- Main Number [phone]   Return to our office in 4 weeks for routine prenatal visit (or sooner if any problems/concerns arise- see packet for things to report)  Check out [employer] website to read the "Pregnancy Essentials Guide"      It can be found by going to [facility]  [employer]-->select services-->select women's health-->select Obstetrics  [email or web address]    Below is a variety of information that is useful in early pregnancy   Genetic screening can be very confusing for most people   We do recommend genetic screening universally for all pregnant women  Our goal is to have the most healthy uncomplicated pregnancy possible and this is one way to identify possible complications early  Common disorders we can screen for include: Down Syndrome, Neural tube defects ( like spina bifida or gastroschisis) and trisomy 15, even possibly more  There are several options we will talk more about  Below is some information to get you started thinking about this  You can follow the link to watch a short video about genetic screening/testing   [facility] [employer]    GUIDE TO GENETIC TESTING    Aneuploidy Testing  Trisomy 21 (Down Syndrome), Trisomy 18, and Open Neural Tube Defects (Spina Bifida)  You may choose one of the following options: See below for CPT/Diagnostic codes  NIPT (Non-Invasive Prenatal Testing or Cell Free- Fetal DNA): This simple and accurate non-invasive prenatal screening blood test offers results for early risk assessment of Down syndrome (Trisomy 21), or Trisomy 25, trisomy 15 and other aneuploidy conditions  The test also offers an optional analysis for fetal sex    The test analyzes the relative amount of 21, 18, 13; X and Y chromosome material in circulating cell-free DNA from a maternal blood sample  This test can be performed at any time after 10 weeks gestation  If you elect this test, you will also have an AFP (alpha-fetoprotein) blood test to test for open neural tube defects  Recommended follow up to a positive result is genetic counseling and prenatal diagnosis  Sequential Screening with Nuchal Translucency: This is a two-step test to detect whether a fetus is at increased risk for trisomy 24, trisomy 25, trisomy 15 and open neural tube defects  The test has a narrow window for testing (the first step must be performed between 10 and 13 weeks gestation)  It includes two blood draws and an ultrasound  The ultrasound measures the amount of fluid behind the babys neck called the nuchal translucency (NT)  The blood tests measures three different maternal hormone levels, -- pregnancy associated plasma protein (RADHA-A), human chorionic gonadotrophin (hCG), and dimeric inhibin A (KENROY)  These measurements in combination with some maternal information such as height and weight are used to calculate whether the baby is at increased risk for Down Syndrome or Trisomy 18  An alpha-fetoprotein test (AFP) is also included to test for open neural tube defects  Recommended follow up to a positive result is additional testing that is more definitive, and referral for genetic counseling and prenatal diagnosis  Quad Screen: This test is also known as the quadruple marker test   It is a test that measures levels of four substances in a pregnant womans blood--Alpha-fetoprotein (AFP), a protein made by the developing baby; Human chorionic gonadotropin (hCG), a hormone made by the placenta; Estriol, a hormone made by the placenta and the babys liver; and Inhibin A, another hormone made by the placenta    Typically, the quad screen is done between weeks 15 and 20 of pregnancy--the second trimester and the results indicate whether the baby is at higher risk for Down Syndrome (Trisomy 21), Trisomy 18, and open neural tube defects  This is a screening test   Recommended follow up to a positive result is additional testing that is more definitive, as well as referral for genetic counseling and prenatal diagnosis  Trisomy 21 Trisomy 18 Trisomy 13   NIPT  (FPR 0 1%) <99% <98% 99%   Sequential Screening (FPR 3 5%) 92% 90% N/A   Quad Screen   (FPR 5%) 83% 80% N/A   (FPR is False Positive Rate)       Additional Screening Tests Offered  Cystic Fibrosis: Cystic Fibrosis is the most common inherited disease of children and young adults  The carrier frequency is 1 in 24, to 1 in 97  Both parents need to be carriers for a child to be affected (25% chance)  One in 200, children born are affected  Cystic fibrosis is a disorder of mucus production and produces abnormally thick mucus leading to life threatening lung infections, digestion problems, poor growth, infertility, and more  Symptoms range from mild to severe, but individuals with severe disease may die in childhood  With treatments today, people with Cystic Fibrosis can live into their 30s  CF does not affect intelligence  Recommended follow up to a positive result is testing of the babys father  Spinal Muscular Atrophy (SMA): SMA is the most common inherited cause of early childhood death  The carrier frequency is 1 in 52 to 1 in 67 in the [place] and both parents need to be carriers for a child to be affected (25% chance)  1 in 11,000 children are affected  SMA is a progressive degeneration of lower motor neurons  Muscle weakness is the most common type with respiratory failure by the age of 3years old  Muscles responsible for crawling, walking, swallowing, and head and neck control are most severely affected  Recommended follow up to a positive result is testing of the babys father        Fragile X Syndrome (the most common inherited cause of developmental delays): Fragile X syndrome is an X-linked genetic disease, which means it is only carried by the mom  Unfortunately, 1 in 250 females are carriers and a child has a 50% chance of being affected if this is the case  1 in 4000 boys is affected with Fragile X and 1 in 8000 girls is affected  Approximately 1/3 of all children born with Fragile X also have autism and hyperactivity  Recommended follow up to a positive result is genetic counseling and prenatal diagnosis  Guide To Insurance Coverage For Genetic Screening  Due to the complexity of coverage guidelines, we are unable to quote benefits or guarantee insurance coverage for any of these tests  Insurance benefits are plan-specific and offer vastly different coverage based on your policy  The handout attached explains the benefits to each test, and also provides the billing (CPT) codes for each test   Even if the testing is covered, it could be applied to any unmet deductibles, and copays may apply, resulting in a bill  You are encouraged to contact your insurance company to obtain your benefits based on your age and risk factors, so that there are no surprises  Test Insurance Procedure (CPT) code   NIPT-cell free fetal DNA Most accurate non-invasive test- not always covered w/o risk factors 07656   Sequential Screen w/ NT US Current standard test-should be covered Part 1: (if lab is [facility]) 92659,60375   (If lab is [facility]) 62135  Part 2: (if lab is [facility])35207,53645,78466, 44150  (If lab is [facility]) 38122   Quad screen Old standard-use if past 1st trimester 72599, [address] [facility], [address], [address]   CF- Cystic Fibrosis  81740   SMA- Spinal Musc   Atrophy  02946   Fragile X  [id number]       Diagnosis code used Varies based on history- But will be one of these:  Encounter for  screening for other genetic defect Z36 8  Advanced Maternal Age (over 28) O12 46  Family History of Genetic Disease Carrier Z84 81    Please contact your insurance company with the appropriate CPT code from the attached list, and diagnosis code applicable to your situation  We ask that you review this information and decide what testing you would like to have performed  Please note that the Sequential Screening with Nuchal Translucency has a smaller window of time to be performed during pregnancy (Prior to 14 wks)  Warning Signs During Pregnancy  The list below includes warning signs your providers would like you to be aware of  If you experience any of these at any time during your pregnancy, please call us as soon as possible  Vaginal bleeding   Sharp abdominal pain that does not go away   Fever (more than 100  4? F and is not relieved with Tylenol)   Persistent vomiting lasting greater than 24 hours   Chest pain   Pain or burning when you urinate      Call the OFFICE  for any questions/emergencies  At night or on the weekend, please indicate it is an emergency and the DOCTOR on call will be paged  Discomforts of Early Pregnancy    Tips for coping with nausea and vomiting during pregnancy   Eat meals and snacks slowly   Eat every 1-2 hours to avoid a full stomach   Dont skip meals, avoid empty stomach   Eat a snack prior to getting out of bed   Avoid food and beverages with a strong aroma   Avoid dehydration - drink enough fluid to keep the urine pale yellow   Drink fluids before a meal to minimize the effect of a full stomach   Limit the amount of coffee and beverages that contain caffeine   Eliminate spicy, odorous, high fat (fried foods), acidic (tomato products) and sweet foods   Fluids that contain lemon (lemonade), mint (tea) or orange can usually be well tolerated   Snacks and meals that contain low-fat protein (lean meats, fish, poultry and eggs) along with eating easily digestible carbs (fruit, rice, toast, crackers and dry cereal) may be tolerated better   Foods with ginger may be well tolerated   May use ginger root powder, capsules or extract (up to 1000 mg per day)   Drink liquids in small amounts    If symptoms persist, please contact your provider  Tips for coping with constipation during pregnancy   Increase fiber and fluids   - Drink 8-10 cups of liquid, like water or low-sugar juice daily  - Keep urine pale with fluids (water, milk), fruit and vegetables   Eat a well-balanced diet that contains high fiber food (fruits, vegetables, whole grain breads and cereals, bran and dried beans)   Take a 30-minute walk daily   You may take a mild stool softener such as Colace®    If symptoms persist, please contact your provider  For any emergencies, PLEASE CALL THE OFFICE  If the office is closed, the doctor on call will be paged by the answering service  Medications and Pregnancy- please see above- pregnancy essentials guide- or handout given today  What fish is safe to eat during pregnancy?- againe see pregnancy essentials guide    Expected Weight Gain During Pregnancy  If you have a healthy BMI (18-25) prior to pregnancy:  The recommended weight gain is between 25-35 pounds  Approximate weight gain  in the first trimester is 1-4 5 pounds  An expected weight gain during the second and  third trimester is approximately one pound per week  If you have a BMI of less than 18 prior to pregnancy,  you are considered underweight:  The recommended weight gain is between 28-40 pounds  Approximate weight gain  in the first trimester is 1-4 5 pounds  An expected weight gain during the second and  third trimester is just over one pound per week  If your BMI is 25 to 29 9: you are considered overweight:  You should gain 1/2 to 2/3 pound during the second and third trimester, for a total  weight gain of 15 to 25 pounds  If you have a BMI of greater than 30 prior to pregnancy,  you are considered overweight:  The recommended weight gain is between 15-25 pounds  Approximate weight gain  in the first trimester is 1-4 5 pounds   An expected weight gain during the second  and third trimester is approximately 0 5 pound per week  Foods to avoid during pregnancy:   Unpasteurized milk, juice and cheese  - Soft cheeses like feta or brie (if made with UNPASTEURIZED milk)   Unheated deli meats like lunchmeat and hotdogs   Undercooked poultry, beef, pork, seafood including raw sushi      Exercise During Pregnancy  A daily exercise program that consists of 30 minutes a day is recommended  Low impact exercises like walking and swimming are great exercises throughout  all of pregnancy   If youre an avid strength  avoid lifting very heavy weights - nothing more  than 30 pounds    Drink plenty of fluids while exercising to stay hydrated  Be careful to avoid overheating  ACTIVITIES TO AVOID   Exercises that can make you lose your balance  Activities that can put your baby at risk i e  horseback riding, scuba diving, skiing  or snowboarding  Any other sport that puts you at risk for getting hit in the  abdominal area  Do not use saunas, steam rooms or hot tubs (that have a higher temperature  than 100F)   After the first trimester, avoid exercises that require you to lay flat on your back  Avoid exceeding a heart rate greater than 140 beats per minute  As long as you are  able to hold a conversation while exercising your heart rate is likely acceptable    Vaccines and Pregnancy    Information for pregnant women  Vaccines help protect you and your baby against serious diseases  Whooping Cough Vaccine  Whooping cough (or pertussis) can be serious for anyone, but for your , it can be lifethreatening  Up to 20 babies die each year in the [facility] due to whooping cough  When you get the whooping cough vaccine during your pregnancy, your body will create protective  antibodies and pass some of them to your baby before birth  These antibodies will provide your  baby some short-term, early protection against whooping cough    Learn more at www cdc gov/pertussis/pregnant/     Flu and COVID Vaccine  Changes in your immune, heart, and lung functions during pregnancy make you more likely to get  seriously ill from the flu  Catching the flu also increases your chances for serious problems for your  developing baby, including premature labor and delivery  Get the flu shot if you are pregnant during  flu season--its the best way to protect yourself and your baby for several months after birth from flu-related  complications  Flu seasons vary in their timing from season to season, but CDC recommends getting vaccinated  by the end of October, if possible  This timing helps protect you before flu activity begins to increase    Find more on how to prevent the flu by visiting www cdc gov/flu/   Covid vaccine is now recommend for pregnant women by [employer] and [facility]

## 2022-05-16 NOTE — PROGRESS NOTES
Subjective  Patient ID: Niels Harris is a 36 y o  female here for Pregnancy Ultrasound (LMP- 3/10/22  9w4d  Planned but tgot pregnant sooner than expected  Very nervous  Some cramping but no bleeding  Grav-0  Nausea at night  No vomiting  )    She is C/O amenorrhea  Signs and symptoms of pregnancy:    Breast tenderness yes   Fatigue yes   Cramping or Pelvic Pain no   Spotting or Vaginal Bleeding no   Nausea or vomiting no    LMP 3/10/22   She had a positive home pregancy test on 4/10/22  giving her an YOVANY of  12/15/22 and a gestational age of  8w4d (based on LMP)    Menstrual cycle: regular, cycle length:  29 days  Pregnancy was planned  She has started taking a prenatal vitamin    OB History    Para Term  AB Living   1 0 0 0 0 0   SAB IAB Ectopic Multiple Live Births   0 0 0 0 0      # Outcome Date GA Lbr Jack/2nd Weight Sex Delivery Anes PTL Lv   1 Current                 The following portions of the patient's history were reviewed and updated as appropriate: allergies, current medications, past family history, past medical history, past social history, past surgical history, and problem list   Perinent hx that may affect pregnancy    The following portions of the patient's history were reviewed and updated as appropriate: allergies, current medications, past family history, past medical history, past social history, past surgical history, and problem list     Review of Systems    See HPI for pertinent positives  LMP 2021 (Exact Date)   OBGyn Exam      FIRST TRIMESTER OBSTETRIC ULTRASOUND     Patient's last menstrual period was 2021 (exact date)  INDICATION: establish GA      FINDINGS:  A single intrauterine gestation is identified  Gestational Sac: Present and normal appearing   Yolk Sac:  Present and normal in size and appearance  Mean Crown-Rump Length:  18 1 mm = 8 weeks 2 days   Cardiac activity is detected at 184       Adnexa:  No adnexal mass or pathologic cyst   Cul de Sac:  No significant free fluid identified     IMPRESSION:  Single intrauterine pregnancy of 8 weeks 2 gestational age  Fetal cardiac activity detected  No adnexal masses seen  EDC by LMP: 12/15/22  EDC by this Ultrasound: 22  New EDC assigned      Ultrasound Probe Disinfection    A transvaginal ultrasound was performed  Prior to use, disinfection was performed with High Level Disinfection Process (Trophon)  Probe serial number F: [id number] was used  Assessment/Plan:       She is a  with Patient's last menstrual period was 2021 (exact date)  US today is showing a viable IUP  F/U for ob intake, followed by initial prenatal exam in  2 wks           Problem List Items Addressed This Visit     Preexisting diabetes complicating pregnancy, antepartum    Relevant Orders    Ambulatory Referral to Maternal Fetal Medicine      Other Visit Diagnoses     Amenorrhea    -  Primary    Establish gestational age, ultrasound              Orders Placed This Encounter   Procedures    Ambulatory Referral to Maternal Fetal Medicine

## 2022-06-09 ENCOUNTER — INITIAL PRENATAL (OUTPATIENT)
Dept: OBGYN CLINIC | Facility: CLINIC | Age: 32
End: 2022-06-09

## 2022-06-09 VITALS — HEIGHT: 66 IN | BODY MASS INDEX: 33.43 KG/M2 | WEIGHT: 208 LBS

## 2022-06-09 DIAGNOSIS — Z34.01 ENCOUNTER FOR SUPERVISION OF NORMAL FIRST PREGNANCY IN FIRST TRIMESTER: Primary | ICD-10-CM

## 2022-06-09 PROCEDURE — OBC: Performed by: CLINICAL NURSE SPECIALIST

## 2022-06-09 NOTE — PROGRESS NOTES
OB INTAKE INTERVIEW  Patient is 31 y o y o  who presents for OB intake at 11wks  She is accompanied by: partner  The father of her baby Merlin Langdon) is involved in the pregnancy and is 34years old    Last Menstrual Period: 03/10/2022  Ultrasound: Measured 8 weeks 2 days on 2022 by Carmencita Mcneill  Estimated Date of Delivery: 2022 changed by 8 week US    Signs/Symptoms of Pregnancy  Current pregnancy symptoms: mild nausea, fatigue  Constipation no  Headaches YES - no aura- apap  Cramping/spotting no  PICA cravings no    Diabetes-  Body mass index is 33 57 kg/m²  If patient has 1 or more, please order early 1 hour GTT  History of GDM no  BMI >35 no  History of PCOS or current metformin use YES- on metformin  History of LGA/macrosomic infant (4000g/9lbs) no    If patient has 2 or more, please order early 1 hour GTT  BMI>30 YES  AMA no  First degree relative with type 2 diabetes YES  History of chronic HTN, hyperlipidemia, elevated A1C no  High risk race (, , ,  or ) no    Hypertension- if you answer yes, please order preeclampsia labs (cbc, comprehensive metabolic panel, urine protein creatinine ratio, uric acid)  History of of chronic HTN no  History of gestational HTN no  History of preeclampsia, eclampsia, or HELLP syndrome no  History of diabetes YES  History of lupus, autoimmune disease, kidney disease no    Thyroid- if yes order TSH with reflex T4  History of thyroid disease no    Bleeding Disorder or Hx of DVT-patient or first degree relative with history of  Order the following if not done previously     (Factor V, antithrombin III, prothrombin gene mutation, protein C and S Ag, lupus anticoagulant, anticardiolipin, beta-2 glycoprotein)   no    OB/GYN-  History of abnormal pap smear no  History of HPV no  History of Herpes/HSV no  History of other STI (gonorrhea, chlamydia, trich) no  History of prior  no  History of prior  no  History of  delivery prior to 36 weeks 6 days no  History of blood transfusion no  Ok for blood transfusion YES    Substance screening- if yes outside of tobacco for her or anyone in her home-order urine drug screen  History of tobacco use no  Currently using tobacco no  Currently using alcohol no  Presently using drugs no  Past drug use  no  IV drug use-If yes add Hep C antibody to labs no  Partner drug use no  Parent/Family drug use no    MRSA Screening-   Does the pt have a hx of MRSA? no  If yes- please follow MRSA protocol and obtain a nasal swab for MRSA culture    Immunizations:  Influenza vaccine given this season NO  Discussed Tdap vaccine YES  Discussed COVID Vaccine NO    Genetic/MFM-  Do you or your partner have a history of any of the following in yourselves or first degree relatives? Cystic fibrosis no  Spinal muscular atrophy no  Hemoglobinopathy/Sickle Cell/Thalassemia no  Fragile X Intellectual Disability no    If yes, discuss carrier screening and recommend consultation with [facility]/genetic counseling  If no, discuss option for carrier screening and/or genetic testing with Nuchal Ultrasound  Patient interested YES  Appointment at [facility] made YES NT is on     Interview education  [facility] Pregnancy Essentials Book reviewed and discussed YES    Nurse/Family Partnership- patient may qualify YES; referral placed NO    Prenatal lab work scripts YES  Extra labs ordered:  1hr    The patient has a history now or in prior pregnancy notable for:  TYPE 2 IDDM      Details that I feel the provider should be aware of: Cody and Amandaadria Downey are expecting baby #1!! She started on insulin a few months back when she told her Dr she was trying to get pregnant  She is doing pretty well,, some mild nausea and some fatigue & recently an occasion HA  Slightly irregular last 2 periods so her EDC was changed based on US  She is a manager at [employer]  PN1 visit scheduled   The patient was oriented to our practice, reviewed delivering physicians and [facility] for Delivery  All questions were answered      Interviewed by: Adonay Avila MA

## 2022-06-09 NOTE — PATIENT INSTRUCTIONS
Congratulations!! Please review our Pregnancy Essential Guide and [facility] L&D Virtual tour from our [employer]  [facility] Pregnancy Essentials Guide  [facility] ([address] [facility])     [address] [facility] (Suzanne Hu  [facility])

## 2022-06-13 ENCOUNTER — LAB (OUTPATIENT)
Dept: LAB | Facility: CLINIC | Age: 32
End: 2022-06-13
Payer: COMMERCIAL

## 2022-06-13 ENCOUNTER — TELEPHONE (OUTPATIENT)
Dept: OBGYN CLINIC | Facility: CLINIC | Age: 32
End: 2022-06-13

## 2022-06-13 DIAGNOSIS — O24.311 PRE-EXISTING DIABETES MELLITUS DURING PREGNANCY IN FIRST TRIMESTER: ICD-10-CM

## 2022-06-13 DIAGNOSIS — Z34.01 ENCOUNTER FOR SUPERVISION OF NORMAL FIRST PREGNANCY IN FIRST TRIMESTER: ICD-10-CM

## 2022-06-13 DIAGNOSIS — O24.311 PRE-EXISTING DIABETES MELLITUS DURING PREGNANCY IN FIRST TRIMESTER: Primary | ICD-10-CM

## 2022-06-13 LAB
ABO GROUP BLD: NORMAL
ALBUMIN SERPL BCP-MCNC: 3.2 G/DL (ref 3.5–5)
ALP SERPL-CCNC: 47 U/L (ref 46–116)
ALT SERPL W P-5'-P-CCNC: 18 U/L (ref 12–78)
ANION GAP SERPL CALCULATED.3IONS-SCNC: 6 MMOL/L (ref 4–13)
AST SERPL W P-5'-P-CCNC: 14 U/L (ref 5–45)
BACTERIA UR QL AUTO: ABNORMAL /HPF
BASOPHILS # BLD AUTO: 0.02 THOUSANDS/ΜL (ref 0–0.1)
BASOPHILS NFR BLD AUTO: 0 % (ref 0–1)
BILIRUB SERPL-MCNC: 0.81 MG/DL (ref 0.2–1)
BILIRUB UR QL STRIP: NEGATIVE
BLD GP AB SCN SERPL QL: NEGATIVE
BUN SERPL-MCNC: 3 MG/DL (ref 5–25)
CALCIUM ALBUM COR SERPL-MCNC: 9.5 MG/DL (ref 8.3–10.1)
CALCIUM SERPL-MCNC: 8.9 MG/DL (ref 8.3–10.1)
CHLORIDE SERPL-SCNC: 107 MMOL/L (ref 100–108)
CLARITY UR: CLEAR
CO2 SERPL-SCNC: 25 MMOL/L (ref 21–32)
COLOR UR: COLORLESS
CREAT SERPL-MCNC: 0.48 MG/DL (ref 0.6–1.3)
EOSINOPHIL # BLD AUTO: 0.19 THOUSAND/ΜL (ref 0–0.61)
EOSINOPHIL NFR BLD AUTO: 2 % (ref 0–6)
ERYTHROCYTE [DISTWIDTH] IN BLOOD BY AUTOMATED COUNT: 12.1 % (ref 11.6–15.1)
EST. AVERAGE GLUCOSE BLD GHB EST-MCNC: 140 MG/DL
GFR SERPL CREATININE-BSD FRML MDRD: 132 ML/MIN/1.73SQ M
GLUCOSE 1H P 50 G GLC PO SERPL-MCNC: 206 MG/DL (ref 40–134)
GLUCOSE SERPL-MCNC: 202 MG/DL (ref 65–140)
GLUCOSE UR STRIP-MCNC: NEGATIVE MG/DL
HBA1C MFR BLD: 6.5 %
HBV SURFACE AG SER QL: NORMAL
HCT VFR BLD AUTO: 39.2 % (ref 34.8–46.1)
HCV AB SER QL: NORMAL
HGB BLD-MCNC: 13.4 G/DL (ref 11.5–15.4)
HGB UR QL STRIP.AUTO: NEGATIVE
IMM GRANULOCYTES # BLD AUTO: 0.03 THOUSAND/UL (ref 0–0.2)
IMM GRANULOCYTES NFR BLD AUTO: 0 % (ref 0–2)
KETONES UR STRIP-MCNC: ABNORMAL MG/DL
LEUKOCYTE ESTERASE UR QL STRIP: NEGATIVE
LYMPHOCYTES # BLD AUTO: 2.04 THOUSANDS/ΜL (ref 0.6–4.47)
LYMPHOCYTES NFR BLD AUTO: 22 % (ref 14–44)
MCH RBC QN AUTO: 30.5 PG (ref 26.8–34.3)
MCHC RBC AUTO-ENTMCNC: 34.2 G/DL (ref 31.4–37.4)
MCV RBC AUTO: 89 FL (ref 82–98)
MONOCYTES # BLD AUTO: 0.45 THOUSAND/ΜL (ref 0.17–1.22)
MONOCYTES NFR BLD AUTO: 5 % (ref 4–12)
NEUTROPHILS # BLD AUTO: 6.61 THOUSANDS/ΜL (ref 1.85–7.62)
NEUTS SEG NFR BLD AUTO: 71 % (ref 43–75)
NITRITE UR QL STRIP: NEGATIVE
NON-SQ EPI CELLS URNS QL MICRO: ABNORMAL /HPF
NRBC BLD AUTO-RTO: 0 /100 WBCS
PH UR STRIP.AUTO: 6.5 [PH]
PLATELET # BLD AUTO: 275 THOUSANDS/UL (ref 149–390)
PMV BLD AUTO: 10 FL (ref 8.9–12.7)
POTASSIUM SERPL-SCNC: 3.4 MMOL/L (ref 3.5–5.3)
PROT SERPL-MCNC: 6.9 G/DL (ref 6.4–8.2)
PROT UR STRIP-MCNC: NEGATIVE MG/DL
RBC # BLD AUTO: 4.4 MILLION/UL (ref 3.81–5.12)
RBC #/AREA URNS AUTO: ABNORMAL /HPF
RH BLD: POSITIVE
RPR SER QL: NORMAL
RUBV IGG SERPL IA-ACNC: >175 IU/ML
SODIUM SERPL-SCNC: 138 MMOL/L (ref 136–145)
SP GR UR STRIP.AUTO: 1.01 (ref 1–1.03)
UROBILINOGEN UR STRIP-ACNC: <2 MG/DL
WBC # BLD AUTO: 9.34 THOUSAND/UL (ref 4.31–10.16)
WBC #/AREA URNS AUTO: ABNORMAL /HPF

## 2022-06-13 PROCEDURE — 82950 GLUCOSE TEST: CPT

## 2022-06-13 PROCEDURE — 81001 URINALYSIS AUTO W/SCOPE: CPT

## 2022-06-13 PROCEDURE — 80081 OBSTETRIC PANEL INC HIV TSTG: CPT

## 2022-06-13 PROCEDURE — 36415 COLL VENOUS BLD VENIPUNCTURE: CPT

## 2022-06-13 PROCEDURE — 86803 HEPATITIS C AB TEST: CPT

## 2022-06-13 PROCEDURE — 80053 COMPREHEN METABOLIC PANEL: CPT

## 2022-06-13 PROCEDURE — 87086 URINE CULTURE/COLONY COUNT: CPT

## 2022-06-13 PROCEDURE — 83036 HEMOGLOBIN GLYCOSYLATED A1C: CPT

## 2022-06-13 PROCEDURE — 3044F HG A1C LEVEL LT 7.0%: CPT | Performed by: OBSTETRICS & GYNECOLOGY

## 2022-06-13 NOTE — TELEPHONE ENCOUNTER
Let pt know per Lino Bloom a few more labs added, she should fast and she said she will get those done asap

## 2022-06-14 ENCOUNTER — INITIAL PRENATAL (OUTPATIENT)
Dept: OBGYN CLINIC | Facility: CLINIC | Age: 32
End: 2022-06-14

## 2022-06-14 VITALS — BODY MASS INDEX: 32.18 KG/M2 | DIASTOLIC BLOOD PRESSURE: 78 MMHG | WEIGHT: 199.4 LBS | SYSTOLIC BLOOD PRESSURE: 130 MMHG

## 2022-06-14 DIAGNOSIS — Z3A.12 12 WEEKS GESTATION OF PREGNANCY: ICD-10-CM

## 2022-06-14 DIAGNOSIS — E11.65 PRE-EXISTING TYPE 2 DIABETES MELLITUS WITH HYPERGLYCEMIA DURING PREGNANCY IN FIRST TRIMESTER (HCC): ICD-10-CM

## 2022-06-14 DIAGNOSIS — O24.111 PRE-EXISTING TYPE 2 DIABETES MELLITUS WITH HYPERGLYCEMIA DURING PREGNANCY IN FIRST TRIMESTER (HCC): ICD-10-CM

## 2022-06-14 DIAGNOSIS — Z34.01 ENCOUNTER FOR SUPERVISION OF NORMAL FIRST PREGNANCY IN FIRST TRIMESTER: Primary | ICD-10-CM

## 2022-06-14 LAB
BACTERIA UR CULT: NORMAL
CREAT UR-MCNC: 13.2 MG/DL
HIV 1+2 AB+HIV1 P24 AG SERPL QL IA: NORMAL
PROT UR-MCNC: <6 MG/DL
PROT/CREAT UR: <0.45 MG/G{CREAT} (ref 0–0.1)
SL AMB  POCT GLUCOSE, UA: ABNORMAL
SL AMB POCT URINE PROTEIN: ABNORMAL

## 2022-06-14 PROCEDURE — 84156 ASSAY OF PROTEIN URINE: CPT | Performed by: OBSTETRICS & GYNECOLOGY

## 2022-06-14 PROCEDURE — 82570 ASSAY OF URINE CREATININE: CPT | Performed by: OBSTETRICS & GYNECOLOGY

## 2022-06-14 PROCEDURE — 87491 CHLMYD TRACH DNA AMP PROBE: CPT | Performed by: OBSTETRICS & GYNECOLOGY

## 2022-06-14 PROCEDURE — G0145 SCR C/V CYTO,THINLAYER,RESCR: HCPCS | Performed by: OBSTETRICS & GYNECOLOGY

## 2022-06-14 PROCEDURE — PNV: Performed by: OBSTETRICS & GYNECOLOGY

## 2022-06-14 PROCEDURE — 87591 N.GONORRHOEAE DNA AMP PROB: CPT | Performed by: OBSTETRICS & GYNECOLOGY

## 2022-06-14 NOTE — PROGRESS NOTES
Patient presents for Pn1 visit    LMP-3/10/22  YOVANY-22  GA:12w3d  Urine-protein neg/trace glucose  Last pap-19  GC Swab to be collected today  Pn1 labs active/getting done  Lupis's [place] folder given at today's visit and thoroughly reviewed  No LOF, bleeding, cramping or discharge  No questions or concerns for today's visit     U/S 22

## 2022-06-15 NOTE — RESULT ENCOUNTER NOTE
PNBW reviewed at appt yesterday  Mildly elevated P/C ratio  Has hx of proteinuria   Not new  She is T2 DM

## 2022-06-16 ENCOUNTER — ROUTINE PRENATAL (OUTPATIENT)
Dept: PERINATAL CARE | Facility: OTHER | Age: 32
End: 2022-06-16
Payer: COMMERCIAL

## 2022-06-16 ENCOUNTER — TELEMEDICINE (OUTPATIENT)
Dept: PERINATAL CARE | Facility: CLINIC | Age: 32
End: 2022-06-16
Payer: COMMERCIAL

## 2022-06-16 VITALS
WEIGHT: 200 LBS | HEART RATE: 102 BPM | HEIGHT: 66 IN | SYSTOLIC BLOOD PRESSURE: 138 MMHG | BODY MASS INDEX: 32.14 KG/M2 | DIASTOLIC BLOOD PRESSURE: 84 MMHG

## 2022-06-16 VITALS — BODY MASS INDEX: 31.98 KG/M2 | HEIGHT: 66 IN | WEIGHT: 199 LBS

## 2022-06-16 DIAGNOSIS — Z3A.12 12 WEEKS GESTATION OF PREGNANCY: ICD-10-CM

## 2022-06-16 DIAGNOSIS — Z36.82 NUCHAL TRANSLUCENCY OF FETUS ON PRENATAL ULTRASOUND: ICD-10-CM

## 2022-06-16 DIAGNOSIS — E11.65 PRE-EXISTING TYPE 2 DIABETES MELLITUS WITH HYPERGLYCEMIA DURING PREGNANCY IN FIRST TRIMESTER (HCC): Primary | ICD-10-CM

## 2022-06-16 DIAGNOSIS — O99.211 OBESITY AFFECTING PREGNANCY IN FIRST TRIMESTER: ICD-10-CM

## 2022-06-16 DIAGNOSIS — O24.319 PREEXISTING DIABETES COMPLICATING PREGNANCY, ANTEPARTUM: ICD-10-CM

## 2022-06-16 DIAGNOSIS — Z28.310 COVID-19 VACCINE SERIES DECLINED: ICD-10-CM

## 2022-06-16 DIAGNOSIS — O24.111 PRE-EXISTING TYPE 2 DIABETES MELLITUS WITH HYPERGLYCEMIA DURING PREGNANCY IN FIRST TRIMESTER (HCC): Primary | ICD-10-CM

## 2022-06-16 DIAGNOSIS — O24.119 TYPE 2 DIABETES MELLITUS AFFECTING PREGNANCY, ANTEPARTUM: ICD-10-CM

## 2022-06-16 DIAGNOSIS — Z13.79 GENETIC SCREENING: ICD-10-CM

## 2022-06-16 DIAGNOSIS — J45.20 MILD INTERMITTENT ASTHMA WITHOUT COMPLICATION: ICD-10-CM

## 2022-06-16 DIAGNOSIS — Z28.21 COVID-19 VACCINE SERIES DECLINED: ICD-10-CM

## 2022-06-16 LAB
C TRACH DNA SPEC QL NAA+PROBE: NEGATIVE
N GONORRHOEA DNA SPEC QL NAA+PROBE: NEGATIVE

## 2022-06-16 PROCEDURE — NC001 PR NO CHARGE: Performed by: NURSE PRACTITIONER

## 2022-06-16 PROCEDURE — 3008F BODY MASS INDEX DOCD: CPT | Performed by: OBSTETRICS & GYNECOLOGY

## 2022-06-16 PROCEDURE — 1036F TOBACCO NON-USER: CPT | Performed by: OBSTETRICS & GYNECOLOGY

## 2022-06-16 PROCEDURE — 99242 OFF/OP CONSLTJ NEW/EST SF 20: CPT | Performed by: OBSTETRICS & GYNECOLOGY

## 2022-06-16 PROCEDURE — 36415 COLL VENOUS BLD VENIPUNCTURE: CPT | Performed by: OBSTETRICS & GYNECOLOGY

## 2022-06-16 PROCEDURE — 76813 OB US NUCHAL MEAS 1 GEST: CPT | Performed by: OBSTETRICS & GYNECOLOGY

## 2022-06-16 RX ORDER — BLOOD-GLUCOSE METER
EACH MISCELLANEOUS
Qty: 1 KIT | Refills: 0 | Status: SHIPPED | OUTPATIENT
Start: 2022-06-16

## 2022-06-16 RX ORDER — LANCETS 33 GAUGE
EACH MISCELLANEOUS
Qty: 150 EACH | Refills: 6 | Status: SHIPPED | OUTPATIENT
Start: 2022-06-16

## 2022-06-16 RX ORDER — BLOOD-GLUCOSE SENSOR
EACH MISCELLANEOUS
Qty: 1 EACH | Refills: 12 | Status: SHIPPED | OUTPATIENT
Start: 2022-06-16

## 2022-06-16 RX ORDER — BLOOD-GLUCOSE TRANSMITTER
EACH MISCELLANEOUS
Qty: 1 EACH | Refills: 3 | Status: SHIPPED | OUTPATIENT
Start: 2022-06-16

## 2022-06-16 RX ORDER — BLOOD-GLUCOSE,RECEIVER,CONT
EACH MISCELLANEOUS
Qty: 1 EACH | Refills: 0 | Status: SHIPPED | OUTPATIENT
Start: 2022-06-16

## 2022-06-16 RX ORDER — BLOOD SUGAR DIAGNOSTIC
STRIP MISCELLANEOUS
Qty: 150 EACH | Refills: 6 | Status: SHIPPED | OUTPATIENT
Start: 2022-06-16

## 2022-06-16 NOTE — PROGRESS NOTES
Patient chose to have [facility] Non-invasive Prenatal Screen  Patient given brochure and is aware [facility] will contact patients insurance and coordinate coverage  Patient made aware she will need to respond to text message or e-mail from [employer] within 2 business days or testing will be run through insurance  Patient informed text message will come from area code  "415"  Provided [address] [place] [address] # [phone] and web site : [email or web address]     2 vials of blood drawn from right arm, patient tolerated blood draw without difficulty  Specimens labeled with patient identifiers (name, date of birth, specimen collection date), packed and sent via [facility]  Copy of lab order scanned to Epic media  Maternal Fetal Medicine will have results in approximately 7-10 business days and will call patient or notify via [address]  Patient aware viewing lab result online will reveal fetal sex If ordered  Patient verbalized understanding of all instructions and no questions at this time

## 2022-06-16 NOTE — ASSESSMENT & PLAN NOTE
-A1c goal less than 6% with minimal hypoglycemia  -CMP within normal except for glucose level  -UPCR baseline 0 45    -Continue Levemir 10 units at 7 to 8 PM daily   -Continue Metformin XR 2000 mg with dinner    -Follow up with dietitian as scheduled  -Keep 3 day food log to review with dietitian   -Start self monitoring blood glucose fasting and 2 hours after start of each meal  Keep glucose log  Glucose goals: fasting 60-90 mg/dL, 140 mg/dL or less 1 hour post meals, and 120 mg/dL or less 2 hours post meal    -Report glucose readings weekly via [employer] every Thursday   -Start GDM  calorie meal plan with 3 meals and 3 snacks including recommended combination of carb, protein and fat per meal/snack   -Please eat meal or snack every 2-3 5 hours while awake   -No more than 8 to 10 hours of fasting overnight   -Stay active if no restriction from your OB, walk up to 30 minutes a day  -Always have glucose available to treat hypoglycemia  Use 15:15 rule  Refer to hypoglycemia patient education sheet  Test blood sugar when experiencing signs and symptoms of hypoglycemia and prior to driving    -Continue prenatal vitamin as recommended   -Continue follow-up with your OB and MFM as recommended   -Fetal growth ultrasounds as recommended   -Fetal echo to be scheduled  -Starting at 32 weeks gestation; NST twice a week and PRESTON weekly  -Stay in close contact with diabetes education team   -Insulin requirements during pregnancy; basal/bolus concept and Metformin discussed  -Very important to maintain tight glucose control during pregnancy to decrease risk factors including fetal macrosomia; birth injury; risk of ; polyhydramnios; pre-term labor; pre-eclampsia;  hypoglycemia; jaundice and stillbirth  -Diabetes and pregnancy booklet; diet plan and hypoglycemia patient education                  Lab Results   Component Value Date    HGBA1C 6 5 (H) 2022

## 2022-06-16 NOTE — PATIENT INSTRUCTIONS
-A1c goal less than 6% with minimal hypoglycemia  -CMP within normal except for glucose level  -UPCR baseline 0 45    -Continue Levemir 10 units at 7 to 8 PM daily   -Continue Metformin XR 2000 mg with dinner    -Follow up with dietitian as scheduled  -Keep 3 day food log to review with dietitian   -Start self monitoring blood glucose fasting and 2 hours after start of each meal  Keep glucose log  Glucose goals: fasting 60-90 mg/dL, 140 mg/dL or less 1 hour post meals, and 120 mg/dL or less 2 hours post meal    -Report glucose readings weekly via [employer] every Thursday   -Start GDM  calorie meal plan with 3 meals and 3 snacks including recommended combination of carb, protein and fat per meal/snack   -Please eat meal or snack every 2-3 5 hours while awake   -No more than 8 to 10 hours of fasting overnight   -Stay active if no restriction from your OB, walk up to 30 minutes a day  -Always have glucose available to treat hypoglycemia  Use 15:15 rule  Refer to hypoglycemia patient education sheet  Test blood sugar when experiencing signs and symptoms of hypoglycemia and prior to driving    -Continue prenatal vitamin as recommended   -Continue follow-up with your OB and MFM as recommended   -Fetal growth ultrasounds as recommended   -Fetal echo to be scheduled  -Starting at 32 weeks gestation; NST twice a week and PRESTON weekly  -Stay in close contact with diabetes education team   -Insulin requirements during pregnancy; basal/bolus concept and Metformin discussed  -Very important to maintain tight glucose control during pregnancy to decrease risk factors including fetal macrosomia; birth injury; risk of ; polyhydramnios; pre-term labor; pre-eclampsia;  hypoglycemia; jaundice and stillbirth  -Diabetes and pregnancy booklet; diet plan and hypoglycemia patient education

## 2022-06-16 NOTE — LETTER
June 19, 2022     [address] [facility],[address] [facility]  [address], [facility] [address] [id number]    Patient: Lilian Turner   YOB: 1990   Date of Visit: 6/16/2022       Dear Ms Patel: Thank you for referring Janice Hung to me for evaluation  Below are my notes for this consultation  If you have questions, please do not hesitate to call me  I look forward to following your patient along with you  Sincerely,        Nida Herbert MD        CC: No Recipients  Nida Herbert MD  6/19/2022  8:05 PM  Sign when Signing Visit  A fetal ultrasound was completed  See Ob procedures in Epic for an interpretation and recommendations  Do not hesitate to contact us in [facility] if you have questions  Emilio Bess MD, MSCE  Maternal Fetal Medicine      Mahin Tilley  6/16/2022  4:03 PM  Sign when Signing Visit  Patient chose to have [facility] Non-invasive Prenatal Screen  Patient given brochure and is aware [facility] will contact patients insurance and coordinate coverage  Patient made aware she will need to respond to text message or e-mail from [employer] within 2 business days or testing will be run through insurance  Patient informed text message will come from area code  "415"  Provided [employer] # [phone] and web site : [email or web address]     2 vials of blood drawn from right arm, patient tolerated blood draw without difficulty  Specimens labeled with patient identifiers (name, date of birth, specimen collection date), packed and sent via [employer]  Copy of lab order scanned to Epic media  Maternal Fetal Medicine will have results in approximately 7-10 business days and will call patient or notify via [address]  Patient aware viewing lab result online will reveal fetal sex If ordered  Patient verbalized understanding of all instructions and no questions at this time

## 2022-06-16 NOTE — PROGRESS NOTES
Virtual Regular Visit    Verification of patient location:[place]    Patient is located in the following state in which I hold an active license [place]      Assessment/Plan:    Problem List Items Addressed This Visit        Endocrine    Pre-existing type 2 diabetes mellitus with hyperglycemia during pregnancy in first trimester ([facility] [address] ) - Primary     -A1c goal less than 6% with minimal hypoglycemia  -CMP within normal except for glucose level  -UPCR baseline 0 45    -Continue Levemir 10 units at 7 to 8 PM daily   -Continue Metformin XR 2000 mg with dinner    -Follow up with dietitian as scheduled  -Keep 3 day food log to review with dietitian   -Start self monitoring blood glucose fasting and 2 hours after start of each meal  Keep glucose log  Glucose goals: fasting 60-90 mg/dL, 140 mg/dL or less 1 hour post meals, and 120 mg/dL or less 2 hours post meal    -Report glucose readings weekly via [employer] every Thursday   -Start GDM 2000 calorie meal plan with 3 meals and 3 snacks including recommended combination of carb, protein and fat per meal/snack   -Please eat meal or snack every 2-3 5 hours while awake   -No more than 8 to 10 hours of fasting overnight   -Stay active if no restriction from your OB, walk up to 30 minutes a day  -Always have glucose available to treat hypoglycemia  Use 15:15 rule  Refer to hypoglycemia patient education sheet  Test blood sugar when experiencing signs and symptoms of hypoglycemia and prior to driving    -Continue prenatal vitamin as recommended   -Continue follow-up with your OB and MFM as recommended   -Fetal growth ultrasounds as recommended   -Fetal echo to be scheduled  -Starting at 32 weeks gestation; NST twice a week and PRESTON weekly  -Stay in close contact with diabetes education team   -Insulin requirements during pregnancy; basal/bolus concept and Metformin discussed    -Very important to maintain tight glucose control during pregnancy to decrease risk factors including fetal macrosomia; birth injury; risk of ; polyhydramnios; pre-term labor; pre-eclampsia;  hypoglycemia; jaundice and stillbirth  -Diabetes and pregnancy booklet; diet plan and hypoglycemia patient education  Lab Results   Component Value Date    HGBA1C 6 5 (H) 2022              Relevant Medications    Blood Glucose Monitoring Suppl (OneTouch Verio Flex System) w/Device KIT    OneTouch Delica Lancets 40Q MISC    OneTouch Verio test strip    Continuous Blood Gluc Transmit (Dexcom G6 Transmitter) MISC    Continuous Blood Gluc Sensor (Dexcom G6 Sensor) MISC    Continuous Blood Gluc  (Dexcom G6 ) JACINDA    Other Relevant Orders    Mychart glucose flowsheet       Other    BMI 32 0-32 9,adult    Relevant Medications    Blood Glucose Monitoring Suppl (OneTouch Verio Flex System) w/Device KIT    OneTouch Delica Lancets 39C MISC    OneTouch Verio test strip    Continuous Blood Gluc Transmit (Dexcom G6 Transmitter) MISC    Continuous Blood Gluc Sensor (Dexcom G6 Sensor) MISC    Continuous Blood Gluc  (Dexcom G6 ) JACINDA    Other Relevant Orders    Mychart glucose flowsheet    Obesity affecting pregnancy in first trimester     -First visit weight 199 lbs  -Recommended weight gain 11 to 20 lbs  -Start GDM diet              Relevant Medications    Blood Glucose Monitoring Suppl (OneTouch Verio Flex System) w/Device KIT    OneTouch Delica Lancets 00P MISC    OneTouch Verio test strip    Continuous Blood Gluc Transmit (Dexcom G6 Transmitter) MISC    Continuous Blood Gluc Sensor (Dexcom G6 Sensor) MISC    Continuous Blood Gluc  (Dexcom G6 ) JACINDA    Other Relevant Orders    Mychart glucose flowsheet    12 weeks gestation of pregnancy    Relevant Medications    Blood Glucose Monitoring Suppl (OneTouch Verio Flex System) w/Device KIT    OneTouch Delica Lancets 45N MISC    OneTouch Verio test strip    Continuous Blood Gluc Transmit (Dexcom G6 Transmitter) MISC    Continuous Blood Gluc Sensor (Dexcom G6 Sensor) MISC    Continuous Blood Gluc  (Dexcom G6 ) JACINDA    Other Relevant Orders    Mychart glucose flowsheet      Other Visit Diagnoses     Type 2 diabetes mellitus affecting pregnancy, antepartum                   Reason for visit is   Chief Complaint   Patient presents with    Virtual Regular Visit    Diabetes Type 2    Patient Education        Encounter provider [facility], [address] [place] [address]    Provider located at [address] [place] [address]  [address] [facility] [address]  [phone]      Recent Visits  No visits were found meeting these conditions  Showing recent visits within past 7 days and meeting all other requirements  Today's Visits  Date Type Provider Dept   22 [id number] [facility], [address] [facility] today's visits and meeting all other requirements  Future Appointments  No visits were found meeting these conditions  Showing future appointments within next 150 days and meeting all other requirements       The patient was identified by name and date of birth  Yvonrenay Suárez was informed that this is a telemedicine visit and that the visit is being conducted through  [address] and patient was informed this is a secure, HIPAA-complaint platform  She agrees to proceed     My office door was closed  No one else was in the room  She acknowledged consent and understanding of privacy and security of the video platform  The patient has agreed to participate and understands they can discontinue the visit at any time  Patient is aware this is a billable service  Berenice Whitney is a 36 y o  female  15 5/7 weeks gestation T2DM diagnosed 2 to 3 years ago was on Glyburide and Metformin  Switched from Glyburide to Levemir and continues with Metformin  Denies current hypoglycemia; history of issue with Glyburide  Eats 2 meals and 1 to 2 snacks a day   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; [employer] manager; eats dinner between 5 to 6 PM; bedtime 9 PM  Wakes up to 40 minutes 3 times a week   present during visit  HPI     Past Medical History:   Diagnosis Date    Asthma     BMI 29 0-29 9,adult 5/18/2021    BMI 30 0-30 9,adult 5/6/2021    BMI 31 0-31 9,adult 8/24/2021    BMI 33 0-33 9,adult 12/14/2021    Constipation 12/14/2021    COVID-19 5/6/2021    Diabetes mellitus ([facility] [address] )     DM2 (diabetes mellitus, type 2) ([facility])     Elevated blood pressure reading in office without diagnosis of hypertension 3/31/2022    Elevated cortisol level     Hand pain, right     High triglycerides     Hypertriglyceridemia 5/2/2021    Microalbuminuria 5/18/2021    Mild intermittent asthma without complication 8/2/2264    Obesity     Rhinitis, allergic 5/6/2021    Sinobronchitis     Type 2 diabetes mellitus (HCC)        Past Surgical History:   Procedure Laterality Date    CHOLECYSTECTOMY      WISDOM TOOTH EXTRACTION         Current Outpatient Medications   Medication Sig Dispense Refill    Blood Glucose Monitoring Suppl (OneTouch Verio Flex System) w/Device KIT Dispense 1 kit per insurance formulary  1 kit 0    Continuous Blood Gluc  (Dexcom G6 ) JACINDA Use as directed  1 each 0    Continuous Blood Gluc Sensor (Dexcom G6 Sensor) MISC 1 box=1 month supply or 3 sensors, use 1 sensor every 10 days  1 each 12    Continuous Blood Gluc Transmit (Dexcom G6 Transmitter) MISC Transmitter change every 90 days  1 each 3    OneTouch Delica Lancets 19R MISC Use 6 a day or as directed  T2DM  150 each 6    OneTouch Verio test strip Test 6 times a day and as instructed  T2DM and pregnancy   150 each 6    cholecalciferol (VITAMIN D3) 400 units tablet Take 400 Units by mouth daily      Cranberry 125 MG TABS Take by mouth      insulin detemir (LEVEMIR FLEXTOUCH) 100 Units/mL injection pen Inject 10 Units under the skin daily at bedtime 15 mL 0    Insulin Pen Needle 32G X 4 MM MISC Use in the morning Use 1 needle once daily  100 each 0    metFORMIN (GLUCOPHAGE-XR) 500 mg 24 hr tablet Take 4 tablets (2,000 mg total) by mouth daily with dinner 360 tablet 1    Multiple Vitamins-Minerals (multivitamin with minerals) tablet Take 1 tablet by mouth daily      Omega-3 Fatty Acids (fish oil) 1,000 mg Take 2 capsules by mouth see administration instructions 2 cap  qpm        No current facility-administered medications for this visit  Allergies   Allergen Reactions    Latex        Review of Systems   Constitutional: Positive for fatigue (improving )  Negative for fever  HENT: Negative for congestion, sore throat and trouble swallowing  Eyes: Negative for visual disturbance  Last eye exam 2 years ago  Respiratory: Negative for cough and shortness of breath  Cardiovascular: Negative for chest pain and palpitations  Gastrointestinal: Positive for nausea  Negative for constipation, diarrhea and vomiting  Endocrine: Positive for polyuria  Negative for polydipsia and polyphagia  Genitourinary: Negative for difficulty urinating and vaginal bleeding  Neurological: Positive for headaches  Psychiatric/Behavioral: Negative for sleep disturbance  Video Exam    Vitals:    06/16/22 1034   Weight: 90 3 kg (199 lb)   Height: 5' 6" (1 676 m)       Physical Exam  HENT:      Head: Normocephalic  Nose: Nose normal    Eyes:      Conjunctiva/sclera: Conjunctivae normal    Pulmonary:      Effort: Pulmonary effort is normal    Musculoskeletal:      Cervical back: Normal range of motion  Neurological:      Mental Status: She is alert and oriented to person, place, and time  Psychiatric:         Mood and Affect: Mood normal          Behavior: Behavior normal          Thought Content:  Thought content normal          Judgment: Judgment normal           I spent 60 minutes with patient today in which greater than 50% of the time was spent in counseling/coordination of care regarding diabetes and pregnancy risk factors; plan of care; etc      VIRTUAL VISIT Britton 69 verbally agrees to participate in [employer]  Pt is aware that [employer] could be limited without vital signs or the ability to perform a full hands-on physical exam  Suzie Bey understands she or the provider may request at any time to terminate the video visit and request the patient to seek care or treatment in person

## 2022-06-20 LAB
LAB AP GYN PRIMARY INTERPRETATION: NORMAL
Lab: NORMAL

## 2022-06-20 NOTE — PROGRESS NOTES
Problem List Items Addressed This Visit        Endocrine    Pre-existing type 2 diabetes mellitus with hyperglycemia during pregnancy in first trimester [facility])     Scheduled with diabetic pathways 6/16  Lab Results   Component Value Date    HGBA1C 6 5 (H) 06/13/2022                 Other    12 weeks gestation of pregnancy     PN panel completed  HgA1c obtained and improved from prior  NT scan scheduled     TWG goal 11-20#             Other Visit Diagnoses     Encounter for supervision of normal first pregnancy in first trimester    -  Primary    Relevant Orders    Liquid-based pap, screening    Chlamydia/GC amplified DNA by PCR (Completed)    POCT urine dip (Completed)    Protein / creatinine ratio, urine (Completed)

## 2022-06-20 NOTE — PROGRESS NOTES
A fetal ultrasound was completed  See Ob procedures in Epic for an interpretation and recommendations  Do not hesitate to contact us in [facility] if you have questions  Kelly Major MD, [address] [facility]  Maternal Fetal Medicine

## 2022-06-20 NOTE — ASSESSMENT & PLAN NOTE
Scheduled with diabetic pathways 6/16  Lab Results   Component Value Date    HGBA1C 6 5 (H) 06/13/2022

## 2022-06-22 ENCOUNTER — TELEPHONE (OUTPATIENT)
Dept: PERINATAL CARE | Facility: CLINIC | Age: 32
End: 2022-06-22

## 2022-06-22 NOTE — TELEPHONE ENCOUNTER
Called patient to reschedule VIRTUAL appointment cancelled in [facility] SERVICES:    Appointment canceled for [employer] ([phone])   Visit Type: VIRTUAL VISIT PG   Date        Time      Length    Provider                  Department   2022    2:00 PM  60 mins   [facility]            CENTER      Reason for Cancellation: Patient      Patient Comments: Have to work during appointment time  Need to reschedule      Left voicemail request patient to call back to schedule at [phone]

## 2022-06-24 ENCOUNTER — DOCUMENTATION (OUTPATIENT)
Dept: PERINATAL CARE | Facility: CLINIC | Age: 32
End: 2022-06-24

## 2022-06-24 ENCOUNTER — TELEPHONE (OUTPATIENT)
Dept: PERINATAL CARE | Facility: CLINIC | Age: 32
End: 2022-06-24

## 2022-06-24 NOTE — PROGRESS NOTES
Date: 06/24/22  Danish Bey  1990  Estimated Date of Delivery: 12/24/22  13w6d  OB/GYN: VITOR  Pre-existing type 2 DM with hyperglycemia in pregnancy diagnosed 3 years ago   Additional Pregnancy Complications: Obesity      Plan: Discussed with NAHOMI Newton,ALEXIS  Advised patient to continue Levemir 10 units at 7 to 8 PM daily  Increase to 12 units if FBG is trending consistently >90 mg/dl  Requested patient record insulin dose daily on flow sheet    Continue Metformin XR 2000 mg with dinner  Noted elevated 2 hr pp measurements  Attempting to reschedule class 1 and diet guidelines will be reviewed in more detail  Bolus insulin may need to be added if 2 hr pp are consistently >120 mg/dl  Continue testing 4 times per day  Maintain 8 hrs to no longer than 10 hr fast overnight  Diet: : 2000 calorie ([id number]) (PRO: 2/3-1-3/4-1-3/4-2)  Gestational diabetes meal plan; 3 meals and 3 snacks  Testing blood sugars: 4 x per day (Fasting, 2 hour after start of each meal)  Meter: OneTouch Verio Flex   Noted Dexcom G6 CGM ordered on 6/16/22  Activity: Walks 30 minutes daily, follow OB recommendations  Support System: Significant Other / family   Patient Goal: "I will eat 3 meals and 3 snacks each day, including protein at each"  Education:  Class 1 completed with Andrey Owen on 6/23/22 was cancelled by patient  Voicemail message to patient left today to return call and reschedule appointment for class 1  Class 2 will need to be scheduled     Follow up with ALEXIS on 7/21/22  Weight Change:    Pre-gravid weight: 94 3 kg (208 lb)  -3 629 kg (-8 lb)  Weight gain recommendations: BMI (> 30) 11-20 lbs    Ultrasounds  6/16/22 US for viability, dating and nuchal translucency:  Normal growth and PRESTON     Next US scheduled for early anatomy on 7/15/22    Labs  Lab Results   Component Value Date    [id number] 206 (H) 06/13/2022     Lab Results   Component Value Date    GLUF 168 (H) 03/24/2022     Lab Results Component Value Date    HGBA1C 6 5 (H) 06/13/2022       Further fetal surveillance  Beginning at 32 weeks, NST / PRESTON twice a week, if indicated    Ace Minerva, RD,LDN,CDE  Diabetes Education

## 2022-06-28 ENCOUNTER — TELEPHONE (OUTPATIENT)
Dept: OBGYN CLINIC | Facility: CLINIC | Age: 32
End: 2022-06-28

## 2022-06-28 NOTE — TELEPHONE ENCOUNTER
Bumps/rash just on arms and legs not hands or feet, per on call not preg related she will continue with benadryl and topical steroid, or will call pcp
Pt 14wks having very itchy arms and legs,just happened overnight,  no new foods or detergents, noted an abnormal bun and creatine on 6/14
Pt call stating develop a rash on arms and legs,its very itchy  Took Benadryl with no relief 
The patient is a 27y Male complaining of toothache.

## 2022-06-29 ENCOUNTER — OFFICE VISIT (OUTPATIENT)
Dept: URGENT CARE | Facility: MEDICAL CENTER | Age: 32
End: 2022-06-29
Payer: COMMERCIAL

## 2022-06-29 VITALS
BODY MASS INDEX: 30.92 KG/M2 | WEIGHT: 197 LBS | OXYGEN SATURATION: 97 % | RESPIRATION RATE: 18 BRPM | HEIGHT: 67 IN | SYSTOLIC BLOOD PRESSURE: 147 MMHG | DIASTOLIC BLOOD PRESSURE: 83 MMHG | TEMPERATURE: 98.4 F | HEART RATE: 98 BPM

## 2022-06-29 DIAGNOSIS — L30.9 DERMATITIS: Primary | ICD-10-CM

## 2022-06-29 PROCEDURE — 99213 OFFICE O/P EST LOW 20 MIN: CPT | Performed by: PHYSICIAN ASSISTANT

## 2022-06-29 RX ORDER — PREDNISONE 20 MG/1
20 TABLET ORAL DAILY
Qty: 5 TABLET | Refills: 0 | Status: SHIPPED | OUTPATIENT
Start: 2022-06-29 | End: 2022-07-04

## 2022-06-29 NOTE — PATIENT INSTRUCTIONS
Dermatitis  Prednisone once daily x 5 days  Follow up with PCP in 3-5 days  Proceed to  ER if symptoms worsen

## 2022-06-29 NOTE — PROGRESS NOTES
[facility]        NAME: Sharon Akbar is a 36 y o  female  : 1990    MRN: [id number]  DATE: 2022  TIME: 5:07 PM    Assessment and Plan   Dermatitis [L30 9]  1  Dermatitis  predniSONE 20 mg tablet         Patient Instructions     Dermatitis  Prednisone once daily x 5 days  Follow up with PCP in 3-5 days  Proceed to  ER if symptoms worsen  Chief Complaint     Chief Complaint   Patient presents with    Rash     To her body  Began 4 days ago  She is 14 5 weeks pregnant  History of Present Illness       35 y/o female presents c/o itchy rash to upper and lower extremities  Patient states she has been using benadryl with no relief  Denies fever, chills, new soap, new detergent, SOB      Review of Systems   Review of Systems   Constitutional: Negative  HENT: Negative  Eyes: Negative  Respiratory: Negative  Negative for cough, chest tightness, shortness of breath, wheezing and stridor  Cardiovascular: Negative  Negative for chest pain, palpitations and leg swelling  Skin: Positive for rash           Current Medications       Current Outpatient Medications:     cholecalciferol (VITAMIN D3) 400 units tablet, Take 400 Units by mouth daily, Disp: , Rfl:     Cranberry 125 MG TABS, Take by mouth, Disp: , Rfl:     insulin detemir (LEVEMIR FLEXTOUCH) 100 Units/mL injection pen, Inject 10 Units under the skin daily at bedtime, Disp: 15 mL, Rfl: 0    metFORMIN (GLUCOPHAGE-XR) 500 mg 24 hr tablet, Take 4 tablets (2,000 mg total) by mouth daily with dinner, Disp: 360 tablet, Rfl: 1    Omega-3 Fatty Acids (fish oil) 1,000 mg, Take 2 capsules by mouth see administration instructions 2 cap  qpm , Disp: , Rfl:     predniSONE 20 mg tablet, Take 1 tablet (20 mg total) by mouth daily for 5 days, Disp: 5 tablet, Rfl: 0    Prenatal Vit-Fe Fumarate-FA (PRENATAL PO), Take by mouth, Disp: , Rfl:     Blood Glucose Monitoring Suppl (OneTouch Verio Flex System) w/Device KIT, Dispense 1 kit per insurance formulary  , Disp: 1 kit, Rfl: 0    Continuous Blood Gluc  (Dexcom G6 ) JACINDA, Use as directed , Disp: 1 each, Rfl: 0    Continuous Blood Gluc Sensor (Dexcom G6 Sensor) MISC, 1 box=1 month supply or 3 sensors, use 1 sensor every 10 days  , Disp: 1 each, Rfl: 12    Continuous Blood Gluc Transmit (Dexcom G6 Transmitter) MISC, Transmitter change every 90 days  , Disp: 1 each, Rfl: 3    Insulin Pen Needle 32G X 4 MM MISC, Use in the morning Use 1 needle once daily  , Disp: 100 each, Rfl: 0    Multiple Vitamins-Minerals (multivitamin with minerals) tablet, Take 1 tablet by mouth daily (Patient not taking: Reported on 6/16/2022), Disp: , Rfl:     OneTouch Delica Lancets 95O MISC, Use 6 a day or as directed  T2DM , Disp: 150 each, Rfl: 6    OneTouch Verio test strip, Test 6 times a day and as instructed  T2DM and pregnancy  , Disp: 150 each, Rfl: 6    Current Allergies     Allergies as of 06/29/2022 - Reviewed 06/19/2022   Allergen Reaction Noted    Latex  04/12/2016            The following portions of the patient's history were reviewed and updated as appropriate: allergies, current medications, past family history, past medical history, past social history, past surgical history and problem list      Past Medical History:   Diagnosis Date    Asthma     BMI 29 0-29 9,adult 5/18/2021    BMI 30 0-30 9,adult 5/6/2021    BMI 31 0-31 9,adult 8/24/2021    BMI 33 0-33 9,adult 12/14/2021    Constipation 12/14/2021    COVID-19 5/6/2021    Diabetes mellitus [facility] [address] [facility]     DM2 (diabetes mellitus, type 2) ([facility] [address] )     Elevated blood pressure reading in office without diagnosis of hypertension 3/31/2022    Elevated cortisol level     Hand pain, right     High triglycerides     Hypertriglyceridemia 5/2/2021    Microalbuminuria 5/18/2021    Mild intermittent asthma without complication 1/8/9586    Obesity     Rhinitis, allergic 5/6/2021    Sinobronchitis     Type 2 diabetes mellitus ([facility] [address] )        Past Surgical History:   Procedure Laterality Date    CHOLECYSTECTOMY      WISDOM TOOTH EXTRACTION         Family History   Problem Relation Age of Onset    Diabetes Mother     Hypertension Mother     COPD Father     Fibromyalgia Father     No Known Problems Brother     Heart disease Maternal Grandmother     Stroke Maternal Grandmother     Heart attack Maternal Grandmother     Heart disease Maternal Grandfather     Heart attack Maternal Grandfather     No Known Problems Paternal Grandmother     Prostate cancer Paternal Grandfather          Medications have been verified  Objective   /83   Pulse 98   Temp 98 4 °F (36 9 °C)   Resp 18   Ht 5' 7" (1 702 m)   Wt 89 4 kg (197 lb)   LMP 03/10/2022   SpO2 97%   BMI 30 85 kg/m²        Physical Exam     Physical Exam  Constitutional:       Appearance: She is well-developed  HENT:      Head: Normocephalic and atraumatic  Right Ear: External ear normal       Left Ear: External ear normal       Nose: Nose normal       Mouth/Throat:      Pharynx: No oropharyngeal exudate  Cardiovascular:      Rate and Rhythm: Normal rate and regular rhythm  Heart sounds: Normal heart sounds  Pulmonary:      Effort: Pulmonary effort is normal  No respiratory distress  Breath sounds: Normal breath sounds  No wheezing or rales  Chest:      Chest wall: No tenderness  Abdominal:      General: Bowel sounds are normal  There is no distension  Palpations: Abdomen is soft  There is no mass  Tenderness: There is no abdominal tenderness  There is no guarding or rebound  Musculoskeletal:      Cervical back: Normal range of motion and neck supple  Lymphadenopathy:      Cervical: No cervical adenopathy  Skin:     Findings: Rash present   Rash is urticarial

## 2022-07-07 ENCOUNTER — APPOINTMENT (OUTPATIENT)
Dept: LAB | Facility: MEDICAL CENTER | Age: 32
End: 2022-07-07
Payer: COMMERCIAL

## 2022-07-07 DIAGNOSIS — E11.9 TYPE 2 DIABETES MELLITUS WITHOUT COMPLICATION, WITHOUT LONG-TERM CURRENT USE OF INSULIN (HCC): ICD-10-CM

## 2022-07-07 DIAGNOSIS — R80.9 MICROALBUMINURIA: ICD-10-CM

## 2022-07-07 DIAGNOSIS — E78.1 HYPERTRIGLYCERIDEMIA: ICD-10-CM

## 2022-07-07 LAB
ANION GAP SERPL CALCULATED.3IONS-SCNC: 7 MMOL/L (ref 4–13)
BASOPHILS # BLD AUTO: 0.01 THOUSANDS/ΜL (ref 0–0.1)
BASOPHILS NFR BLD AUTO: 0 % (ref 0–1)
BILIRUB UR QL STRIP: NEGATIVE
BUN SERPL-MCNC: 4 MG/DL (ref 5–25)
CALCIUM SERPL-MCNC: 9.2 MG/DL (ref 8.3–10.1)
CHLORIDE SERPL-SCNC: 107 MMOL/L (ref 100–108)
CHOLEST SERPL-MCNC: 150 MG/DL
CLARITY UR: CLEAR
CO2 SERPL-SCNC: 23 MMOL/L (ref 21–32)
COLOR UR: ABNORMAL
CREAT SERPL-MCNC: 0.48 MG/DL (ref 0.6–1.3)
CREAT UR-MCNC: 30.5 MG/DL
EOSINOPHIL # BLD AUTO: 0.27 THOUSAND/ΜL (ref 0–0.61)
EOSINOPHIL NFR BLD AUTO: 3 % (ref 0–6)
ERYTHROCYTE [DISTWIDTH] IN BLOOD BY AUTOMATED COUNT: 12.9 % (ref 11.6–15.1)
EST. AVERAGE GLUCOSE BLD GHB EST-MCNC: 123 MG/DL
GFR SERPL CREATININE-BSD FRML MDRD: 132 ML/MIN/1.73SQ M
GLUCOSE P FAST SERPL-MCNC: 85 MG/DL (ref 65–99)
GLUCOSE UR STRIP-MCNC: NEGATIVE MG/DL
HBA1C MFR BLD: 5.9 %
HCT VFR BLD AUTO: 42.1 % (ref 34.8–46.1)
HDLC SERPL-MCNC: 57 MG/DL
HGB BLD-MCNC: 14.3 G/DL (ref 11.5–15.4)
HGB UR QL STRIP.AUTO: NEGATIVE
IMM GRANULOCYTES # BLD AUTO: 0.08 THOUSAND/UL (ref 0–0.2)
IMM GRANULOCYTES NFR BLD AUTO: 1 % (ref 0–2)
KETONES UR STRIP-MCNC: ABNORMAL MG/DL
LDLC SERPL CALC-MCNC: 74 MG/DL (ref 0–100)
LEUKOCYTE ESTERASE UR QL STRIP: NEGATIVE
LYMPHOCYTES # BLD AUTO: 2.11 THOUSANDS/ΜL (ref 0.6–4.47)
LYMPHOCYTES NFR BLD AUTO: 21 % (ref 14–44)
MCH RBC QN AUTO: 30.9 PG (ref 26.8–34.3)
MCHC RBC AUTO-ENTMCNC: 34 G/DL (ref 31.4–37.4)
MCV RBC AUTO: 91 FL (ref 82–98)
MICROALBUMIN UR-MCNC: <5 MG/L (ref 0–20)
MICROALBUMIN/CREAT 24H UR: <16 MG/G CREATININE (ref 0–30)
MONOCYTES # BLD AUTO: 0.5 THOUSAND/ΜL (ref 0.17–1.22)
MONOCYTES NFR BLD AUTO: 5 % (ref 4–12)
NEUTROPHILS # BLD AUTO: 7.01 THOUSANDS/ΜL (ref 1.85–7.62)
NEUTS SEG NFR BLD AUTO: 70 % (ref 43–75)
NITRITE UR QL STRIP: NEGATIVE
NONHDLC SERPL-MCNC: 93 MG/DL
NRBC BLD AUTO-RTO: 0 /100 WBCS
PH UR STRIP.AUTO: 7.5 [PH]
PLATELET # BLD AUTO: 334 THOUSANDS/UL (ref 149–390)
PMV BLD AUTO: 9.8 FL (ref 8.9–12.7)
POTASSIUM SERPL-SCNC: 3.6 MMOL/L (ref 3.5–5.3)
PROT UR STRIP-MCNC: NEGATIVE MG/DL
RBC # BLD AUTO: 4.63 MILLION/UL (ref 3.81–5.12)
SODIUM SERPL-SCNC: 137 MMOL/L (ref 136–145)
SP GR UR STRIP.AUTO: 1.01 (ref 1–1.03)
TRIGL SERPL-MCNC: 94 MG/DL
UROBILINOGEN UR STRIP-ACNC: <2 MG/DL
WBC # BLD AUTO: 9.98 THOUSAND/UL (ref 4.31–10.16)

## 2022-07-07 PROCEDURE — 3066F NEPHROPATHY DOC TX: CPT | Performed by: NURSE PRACTITIONER

## 2022-07-07 PROCEDURE — 81003 URINALYSIS AUTO W/O SCOPE: CPT | Performed by: INTERNAL MEDICINE

## 2022-07-07 PROCEDURE — 3044F HG A1C LEVEL LT 7.0%: CPT | Performed by: NURSE PRACTITIONER

## 2022-07-07 PROCEDURE — 82570 ASSAY OF URINE CREATININE: CPT | Performed by: INTERNAL MEDICINE

## 2022-07-07 PROCEDURE — 80048 BASIC METABOLIC PNL TOTAL CA: CPT

## 2022-07-07 PROCEDURE — 82043 UR ALBUMIN QUANTITATIVE: CPT | Performed by: INTERNAL MEDICINE

## 2022-07-07 PROCEDURE — 36415 COLL VENOUS BLD VENIPUNCTURE: CPT

## 2022-07-07 PROCEDURE — 85025 COMPLETE CBC W/AUTO DIFF WBC: CPT

## 2022-07-07 PROCEDURE — 83036 HEMOGLOBIN GLYCOSYLATED A1C: CPT

## 2022-07-07 PROCEDURE — 80061 LIPID PANEL: CPT

## 2022-07-12 DIAGNOSIS — E11.9 TYPE 2 DIABETES MELLITUS WITHOUT COMPLICATION, WITHOUT LONG-TERM CURRENT USE OF INSULIN (HCC): ICD-10-CM

## 2022-07-12 PROBLEM — Z31.69 ENCOUNTER FOR PRECONCEPTION CONSULTATION: Status: RESOLVED | Noted: 2021-07-13 | Resolved: 2022-07-12

## 2022-07-12 PROBLEM — B35.9 TINEA: Status: RESOLVED | Noted: 2019-01-08 | Resolved: 2022-07-12

## 2022-07-12 PROBLEM — Z30.41 SURVEILLANCE OF CONTRACEPTIVE PILL: Status: RESOLVED | Noted: 2019-01-08 | Resolved: 2022-07-12

## 2022-07-12 PROBLEM — N39.0 RECURRENT UTI: Status: RESOLVED | Noted: 2020-01-14 | Resolved: 2022-07-12

## 2022-07-12 PROBLEM — J30.9 RHINITIS, ALLERGIC: Status: RESOLVED | Noted: 2021-05-06 | Resolved: 2022-07-12

## 2022-07-12 PROBLEM — Z01.419 ENCOUNTER FOR GYNECOLOGICAL EXAMINATION (GENERAL) (ROUTINE) WITHOUT ABNORMAL FINDINGS: Status: RESOLVED | Noted: 2019-01-08 | Resolved: 2022-07-12

## 2022-07-12 NOTE — PATIENT INSTRUCTIONS
Pregnancy at 15 to 18 Weeks   AMBULATORY CARE:   What changes are happening to your body:  Now that you are in your second trimester, you have more energy  You may also feel hungrier than usual  You may start to experience other symptoms, such as heartburn or dizziness  You may be gaining about ½ to 1 pound a week, and your pregnancy is beginning to show  You may need to start wearing maternity clothes  Seek care immediately if:   You have pain or cramping in your abdomen or low back  You have heavy vaginal bleeding or clotting  You pass material that looks like tissue or large clots  Collect the material and bring it with you  Call your doctor or obstetrician if:   You cannot keep food or drinks down, and you are losing weight  You have light bleeding  You have chills or a fever  You have vaginal itching, burning, or pain  You have yellow, green, white, or foul-smelling vaginal discharge  You have pain or burning when you urinate, less urine than usual, or pink or bloody urine  You have questions or concerns about your condition or care  How to care for yourself at this stage of your pregnancy:       Manage heartburn  by eating 4 or 5 small meals each day instead of large meals  Avoid spicy foods  Avoid eating right before bedtime  Manage nausea and vomiting  Avoid fatty and spicy foods  Eat small meals throughout the day instead of large meals  Madeline may help to decrease nausea  Ask your healthcare provider about other ways of decreasing nausea and vomiting  Eat a variety of healthy foods  Healthy foods include fruits, vegetables, whole-grain breads, low-fat dairy foods, beans, lean meats, and fish  Drink liquids as directed  Ask how much liquid to drink each day and which liquids are best for you  Limit caffeine to less than 200 milligrams each day  Limit your intake of fish to 2 servings each week   Choose fish low in mercury such as canned light tuna, shrimp, salmon, cod, or tilapia  Do not  eat fish high in mercury such as swordfish, tilefish, pramod mackerel, and shark  Take prenatal vitamins as directed  Your need for certain vitamins and minerals, such as folic acid, increases during pregnancy  Prenatal vitamins provide some of the extra vitamins and minerals you need  Prenatal vitamins may also help to decrease the risk of certain birth defects  Do not smoke  Smoking increases your risk of a miscarriage and other health problems during your pregnancy  Smoking can cause your baby to be born too early or weigh less at birth  Ask your healthcare provider for information if you need help quitting  Do not drink alcohol  Alcohol passes from your body to your baby through the placenta  It can affect your baby's brain development and cause fetal alcohol syndrome (FAS)  FAS is a group of conditions that causes mental, behavior, and growth problems  Talk to your healthcare provider before you take any medicines  Many medicines may harm your baby if you take them when you are pregnant  Do not take any medicines, vitamins, herbs, or supplements without first talking to your healthcare provider  Never use illegal or street drugs (such as marijuana or cocaine) while you are pregnant  Safety tips during pregnancy:   Avoid hot tubs and saunas  Do not use a hot tub or sauna while you are pregnant, especially during your first trimester  Hot tubs and saunas may raise your baby's temperature and increase the risk of birth defects  Avoid toxoplasmosis  This is an infection caused by eating raw meat or being around infected cat feces  It can cause birth defects, miscarriages, and other problems  Wash your hands after you touch raw meat  Make sure any meat is well-cooked before you eat it  Avoid raw eggs and unpasteurized milk  Use gloves or ask someone else to clean your cat's litter box while you are pregnant      Changes that are happening with your baby:  By 11 weeks, your baby may be about 6 inches long from the top of the head to the rump (baby's bottom)  Your baby may weigh about 11 ounces  You may be able to feel your baby's movement at about 18 weeks or later  The first movements may not be that noticeable  They may feel like a fluttering sensation  Your baby also makes sucking movements and can hear certain sounds  What you need to know about prenatal care:  During the first 28 weeks of your pregnancy, you will see your healthcare provider once a month  Your healthcare provider will check your blood pressure and weight  You may also need any of the following:  A urine test  may also be done to check for sugar and protein  These can be signs of gestational diabetes or infection  A blood test  may be done to check for anemia (low iron level)  Fundal height check  is a measurement of your uterus to check your baby's growth  This number is usually the same as the number of weeks that you have been pregnant  An ultrasound  may be done to check your baby's development  Your healthcare provider may be able to tell you what your baby's gender is during the ultrasound  Your baby's heart rate  will be checked  © Copyright [employer] 2022 Information is for End User's use only and may not be sold, redistributed or otherwise used for commercial purposes  All illustrations and images included in CareNotes® are the copyrighted property of A D A M , Inc  or [employer]  The above information is an  only  It is not intended as medical advice for individual conditions or treatments  Talk to your doctor, nurse or pharmacist before following any medical regimen to see if it is safe and effective for you  Diabetes and Nutrition   WHAT YOU NEED TO KNOW:   Why are nutrition plans important? Nutrition plans help with healthy eating patterns that improve health   Nutrition plans and regular exercise help keep your blood sugar levels steady  They also help delay or prevent complications of diabetes, such as diabetic kidney disease  How do I create a nutrition plan? A dietitian will help you create a nutrition plan to meet your needs and your family's needs  The goal is for you to reach or maintain healthy weight, blood sugar, blood pressure, and lipid levels  You should meet with the dietitian at least 1 time each year  You will learn the following: How food affects your blood sugar levels    How to create healthy eating habits    How to make food choices based on your activity level, weight, and glucose levels    How your favorite foods may fit into your plan    Foods that contain carbohydrates (sugars and starches), including simple and complex carbohydrates    How to keep track of all carbohydrates    Correct portion sizes for each food    Changes you can make to your plan if you get pregnant or are breastfeeding    What are some tips to do until I meet with the dietitian? Do not skip meals  The goal is to keep your blood sugar level steady  Blood sugar levels may drop too low if you have received insulin and do not eat  Eat more high-fiber foods, such as fresh or frozen fruits and vegetables, whole-grain breads, and beans  Fiber helps control or lower blood sugar and cholesterol levels  Choose whole fruits instead of fruit juice as much as possible  Sugar may be added to juice, and fiber may be removed  Choose heart-healthy fats  Foods high in heart-healthy fats include olive oil, nuts, avocados, and fatty fish, such as salmon and tuna  Foods high in unhealthy fats include red meat, full-fat dairy products, and soft margarine  Unhealthy fats can increase your risk for heart disease, increase bad cholesterol, and lower good cholesterol  Choose complex carbohydrates  Foods with complex carbohydrates include brown rice, whole-grain breads and cereals, and cooked beans   Foods with simple carbohydrates include white bread, white rice, most cold cereals, and snack foods  Your plan will include the amount of carbohydrate to have at one time or in a day  Your blood sugar level can get too high if you eat too much carbohydrate at one time  Blood sugar levels do not spike as high or drop as quickly with complex carbohydrates as with simple carbohydrates  Choose complex carbohydrates whenever possible  Have less sodium (salt)  The risk for high blood pressure (BP) increases with high-sodium foods  Limit high-sodium foods, such as soy sauce, potato chips, and canned soup  Do not add salt to food you cook  Limit your use of table salt  Read labels to have no more than 2,300 milligrams of sodium in one day  Limit artificial sweeteners  These may be found in food or drinks, such as diet soft drinks or other low-calorie beverages  Artificial sweeteners are low in calories  They may help you lower your overall calories and carbohydrates  It is important not to have more calories from other foods to make up for the calories saved  Artificial sweeteners do not have any nutrition  Eat whole foods and drink water as much as possible  Your plan may include beverages with artificial sweeteners for a short time  These can help you transition from high-sugar beverages to water  Use the plate method for each meal   This method can help you eat the right amount of carbohydrates and keep your blood sugar levels under control  Draw an imaginary line down the middle of a 9-inch dinner plate  On one side, draw another line to divide that section in half  Your plate will have one large section and 2 small sections  Fill the largest section with non-starchy vegetables  These include broccoli, spinach, cucumbers, peppers, cauliflower, and tomatoes  Add a starch to one of the small sections  Starches include pasta, rice, whole-grain bread, tortillas, corn, potatoes, and beans      Add meat or another source of protein to the other small section  Examples include chicken or turkey without skin, fish, lean beef or pork, low-fat cheese, tofu, and eggs  Add dairy products or fruit next to your plate if your meal plan allows  Examples of dairy include skim or 1% milk and low-fat yogurt  If you do not drink milk or eat dairy products, you may be able to add another serving of starchy food instead  Have a low-calorie or calorie-free drink with your meal  Examples include water or unsweetened tea or coffee  What are the risks of alcohol? Alcohol can cause hypoglycemia (very low blood sugar level), especially if you use insulin  Alcohol can cause high blood sugar and BP levels, and weight gain if you drink too much  Women 21 years or older and men 72 years or older should limit alcohol to 1 drink a day  Men aged 24 to 59 years should limit alcohol to 2 drinks a day  A drink of alcohol is 12 ounces of beer, 5 ounces of wine, or 1½ ounces of liquor  Hypoglycemia can happen hours after you drink alcohol  Check your blood sugar level for several hours after you drink alcohol  Have a source of fast-acting carbohydrates with you in case your level goes too low  You need immediate care if you have signs or symptoms of hypoglycemia, such as sweating, confusion, or fainting  Why is it important to maintain a healthy weight? A healthy weight can help you control your diabetes  You can maintain a healthy weight with a nutrition plan and exercise  Ask your healthcare provider how much you should weigh  Ask him or her to help you create a weight loss plan if you are overweight  Together you can set weight loss and maintenance goals    Call your local emergency number (911 in the [address] [place] [address],[address]) if:   You have any of the following signs of a heart attack:      Squeezing, pressure, or pain in your chest    You may  also have any of the following:     Discomfort or pain in your back, neck, jaw, stomach, or arm    Shortness of breath    Nausea or vomiting    Lightheadedness or a sudden cold sweat      When should I seek immediate care? You have a low blood sugar level and it does not improve with treatment  Symptoms are trouble thinking, a pounding heartbeat, and sweating  Your blood sugar level is above 240 mg/dL and does not come down within 15 minutes of treatment  You have ketones in your blood or urine  You have nausea or are vomiting and cannot keep any food or liquid down  You have blurred or double vision  Your breath has a fruity, sweet smell, or your breathing is shallow  When should I call my doctor or diabetes care team?   Your blood sugar levels are higher than your target goals  You often have low blood sugar levels  You have trouble coping with diabetes, or you feel anxious or depressed  You have questions or concerns about your condition or care  CARE AGREEMENT:   You have the right to help plan your care  Learn about your health condition and how it may be treated  Discuss treatment options with your healthcare providers to decide what care you want to receive  You always have the right to refuse treatment  The above information is an  only  It is not intended as medical advice for individual conditions or treatments  Talk to your doctor, nurse or pharmacist before following any medical regimen to see if it is safe and effective for you  © Copyright [employer] 2022 Information is for End User's use only and may not be sold, redistributed or otherwise used for commercial purposes  All illustrations and images included in CareNotes® are the copyrighted property of A D A M , Inc  or [facility]    Acute Rash   WHAT YOU NEED TO KNOW:   A rash is irritated, red, or itchy skin or mucus membranes, such as the lining of your nose or throat  Acute means the rash starts suddenly, worsens quickly, and lasts a short time   Common causes include a disease or infection, a reaction to something you are allergic to, or certain medicines  DISCHARGE INSTRUCTIONS:   Return to the emergency department if:   You have sudden trouble breathing or chest pain  You are vomiting, have a headache or muscle aches, and your throat hurts  Call your doctor or dermatologist if:   You have a fever  You get open wounds from scratching your skin, or you have a wound that is red, swollen, or painful  Your rash lasts longer than 3 months  You have swelling or pain in your joints  You have questions or concerns about your condition or care  Medicines:  If your rash does not go away on its own, you may need the following medicines:  Antihistamines  may be given to help decrease itching  Steroids  may be given to decrease inflammation  Antibiotics  help fight or prevent a bacterial infection  Take your medicine as directed  Contact your healthcare provider if you think your medicine is not helping or if you have side effects  Tell him of her if you are allergic to any medicine  Keep a list of the medicines, vitamins, and herbs you take  Include the amounts, and when and why you take them  Bring the list or the pill bottles to follow-up visits  Carry your medicine list with you in case of an emergency  Prevent a rash or care for your skin when you have a rash:  Dry skin can lead to more problems  Do not scratch your skin if it itches  You may cause a skin infection by scratching  The following may prevent dry skin, and help your skin look better:  Help soothe your rash  Apply thick cream lotions or petroleum jelly  Cool compresses may also soothe your skin  Apply a cool compress or a cool, wet towel, and then cover it with a dry towel  Use lukewarm water when you bathe  Hot water may damage your skin more  Pat your skin dry  Do not rub your skin with a towel  Use detergents, soaps, shampoos, and bubble baths  made for sensitive skin  Wear clothes made of cotton instead of nylon or wool  Cotton is softer, so it will not hurt your skin as much  Follow up with your healthcare providers as directed:  A dermatologist may help find the cause of your rash or help plan or change treatment  A dietitian may help with meal planning if you have a food allergy  Write down your questions so you remember to ask them during your visits  © Copyright [employer] 2022 Information is for End User's use only and may not be sold, redistributed or otherwise used for commercial purposes  All illustrations and images included in CareNotes® are the copyrighted property of A D A [facility] Inc  or [facility] Chan   The above information is an  only  It is not intended as medical advice for individual conditions or treatments  Talk to your doctor, nurse or pharmacist before following any medical regimen to see if it is safe and effective for you

## 2022-07-12 NOTE — ASSESSMENT & PLAN NOTE
Khushbu Orellana is a 36 y o   16w5d  TWG -6 35 kg (-14 lb), starting to be able to eat, has not had an appetite  Feels well  Denies LOF/CTX/VB  AFP ordered   C/o rash that developed 2-3 weeks ago on her right arm, pt takes insulin and at first thought an allergy to insulin or baby ASA  Rash has since spread to both arms and both legs  Rash is fine in texture and slightly raised, no pustules noted, no infectious etiology suspected,  Pt was seen at Urgent care and was tx with prednisione which helped a small amount,seen today by Derm today, told her it was PUUPS, prescribed Halobetrol cream BID to arms and legs  There is no rash on her torso  Patient was doing yard work prior to this rash occurring  Conservative management at this time with open male bad as, skin moisturization, steroid cream that was ordered by Alaina Smart, Claritin 10 mg over-the-counter daily, Benadryl if needed for severe itching  Labs for bile acids, and CMP ordered  Patient advised to call if situation gets worse or is not manageable by these interventions  Vaccines discussed    labor precautions reviewed  Encouraged adequate hydration and nutrition  Pregnancy Essential guide and [facility] Me center web site recommended

## 2022-07-13 RX ORDER — PEN NEEDLE, DIABETIC 32GX 5/32"
NEEDLE, DISPOSABLE MISCELLANEOUS
Qty: 100 EACH | Refills: 0 | Status: SHIPPED | OUTPATIENT
Start: 2022-07-13 | End: 2022-09-15 | Stop reason: SDUPTHER

## 2022-07-14 ENCOUNTER — ROUTINE PRENATAL (OUTPATIENT)
Dept: OBGYN CLINIC | Facility: MEDICAL CENTER | Age: 32
End: 2022-07-14

## 2022-07-14 ENCOUNTER — OFFICE VISIT (OUTPATIENT)
Dept: INTERNAL MEDICINE CLINIC | Facility: CLINIC | Age: 32
End: 2022-07-14
Payer: COMMERCIAL

## 2022-07-14 ENCOUNTER — APPOINTMENT (OUTPATIENT)
Dept: LAB | Facility: MEDICAL CENTER | Age: 32
End: 2022-07-14
Payer: COMMERCIAL

## 2022-07-14 VITALS
BODY MASS INDEX: 30.45 KG/M2 | SYSTOLIC BLOOD PRESSURE: 110 MMHG | WEIGHT: 194 LBS | DIASTOLIC BLOOD PRESSURE: 70 MMHG | HEIGHT: 67 IN

## 2022-07-14 VITALS
WEIGHT: 192 LBS | BODY MASS INDEX: 30.13 KG/M2 | HEART RATE: 103 BPM | TEMPERATURE: 98.2 F | RESPIRATION RATE: 16 BRPM | SYSTOLIC BLOOD PRESSURE: 128 MMHG | DIASTOLIC BLOOD PRESSURE: 80 MMHG | OXYGEN SATURATION: 98 % | HEIGHT: 67 IN

## 2022-07-14 DIAGNOSIS — R21 RASH: ICD-10-CM

## 2022-07-14 DIAGNOSIS — Z00.00 ANNUAL PHYSICAL EXAM: Primary | ICD-10-CM

## 2022-07-14 DIAGNOSIS — Z3A.16 16 WEEKS GESTATION OF PREGNANCY: ICD-10-CM

## 2022-07-14 DIAGNOSIS — Z34.02 ENCOUNTER FOR SUPERVISION OF NORMAL FIRST PREGNANCY IN SECOND TRIMESTER: Primary | ICD-10-CM

## 2022-07-14 DIAGNOSIS — R80.9 MICROALBUMINURIA: ICD-10-CM

## 2022-07-14 DIAGNOSIS — Z34.02 ENCOUNTER FOR SUPERVISION OF NORMAL FIRST PREGNANCY IN SECOND TRIMESTER: ICD-10-CM

## 2022-07-14 DIAGNOSIS — E78.1 HYPERTRIGLYCERIDEMIA: ICD-10-CM

## 2022-07-14 DIAGNOSIS — R21 SKIN RASH: ICD-10-CM

## 2022-07-14 DIAGNOSIS — E11.9 TYPE 2 DIABETES MELLITUS WITHOUT COMPLICATION, WITHOUT LONG-TERM CURRENT USE OF INSULIN (HCC): ICD-10-CM

## 2022-07-14 PROBLEM — O24.112 PRE-EXISTING TYPE 2 DIABETES MELLITUS WITH HYPERGLYCEMIA DURING PREGNANCY IN SECOND TRIMESTER (HCC): Status: ACTIVE | Noted: 2022-05-16

## 2022-07-14 LAB
ALBUMIN SERPL BCP-MCNC: 3.1 G/DL (ref 3.5–5)
ALP SERPL-CCNC: 44 U/L (ref 46–116)
ALT SERPL W P-5'-P-CCNC: 21 U/L (ref 12–78)
ANION GAP SERPL CALCULATED.3IONS-SCNC: 5 MMOL/L (ref 4–13)
AST SERPL W P-5'-P-CCNC: 12 U/L (ref 5–45)
BASOPHILS # BLD AUTO: 0.01 THOUSANDS/ΜL (ref 0–0.1)
BASOPHILS NFR BLD AUTO: 0 % (ref 0–1)
BILIRUB SERPL-MCNC: 0.43 MG/DL (ref 0.2–1)
BUN SERPL-MCNC: 4 MG/DL (ref 5–25)
CALCIUM ALBUM COR SERPL-MCNC: 10.1 MG/DL (ref 8.3–10.1)
CALCIUM SERPL-MCNC: 9.4 MG/DL (ref 8.3–10.1)
CHLORIDE SERPL-SCNC: 107 MMOL/L (ref 100–108)
CO2 SERPL-SCNC: 26 MMOL/L (ref 21–32)
CREAT SERPL-MCNC: 0.47 MG/DL (ref 0.6–1.3)
CREAT UR-MCNC: 244 MG/DL
EOSINOPHIL # BLD AUTO: 0.13 THOUSAND/ΜL (ref 0–0.61)
EOSINOPHIL NFR BLD AUTO: 1 % (ref 0–6)
ERYTHROCYTE [DISTWIDTH] IN BLOOD BY AUTOMATED COUNT: 12.6 % (ref 11.6–15.1)
GFR SERPL CREATININE-BSD FRML MDRD: 133 ML/MIN/1.73SQ M
GLUCOSE SERPL-MCNC: 129 MG/DL (ref 65–140)
HCT VFR BLD AUTO: 38.7 % (ref 34.8–46.1)
HGB BLD-MCNC: 13.4 G/DL (ref 11.5–15.4)
IMM GRANULOCYTES # BLD AUTO: 0.05 THOUSAND/UL (ref 0–0.2)
IMM GRANULOCYTES NFR BLD AUTO: 1 % (ref 0–2)
LYMPHOCYTES # BLD AUTO: 2.29 THOUSANDS/ΜL (ref 0.6–4.47)
LYMPHOCYTES NFR BLD AUTO: 21 % (ref 14–44)
MCH RBC QN AUTO: 30.5 PG (ref 26.8–34.3)
MCHC RBC AUTO-ENTMCNC: 34.6 G/DL (ref 31.4–37.4)
MCV RBC AUTO: 88 FL (ref 82–98)
MONOCYTES # BLD AUTO: 0.68 THOUSAND/ΜL (ref 0.17–1.22)
MONOCYTES NFR BLD AUTO: 6 % (ref 4–12)
NEUTROPHILS # BLD AUTO: 7.93 THOUSANDS/ΜL (ref 1.85–7.62)
NEUTS SEG NFR BLD AUTO: 71 % (ref 43–75)
NRBC BLD AUTO-RTO: 0 /100 WBCS
PLATELET # BLD AUTO: 312 THOUSANDS/UL (ref 149–390)
PMV BLD AUTO: 9.7 FL (ref 8.9–12.7)
POTASSIUM SERPL-SCNC: 3.4 MMOL/L (ref 3.5–5.3)
PROT SERPL-MCNC: 6.9 G/DL (ref 6.4–8.2)
PROT UR-MCNC: <6 MG/DL
PROT/CREAT UR: <0.02 MG/G{CREAT} (ref 0–0.1)
RBC # BLD AUTO: 4.39 MILLION/UL (ref 3.81–5.12)
SL AMB  POCT GLUCOSE, UA: NEGATIVE
SL AMB POCT URINE PROTEIN: NEGATIVE
SODIUM SERPL-SCNC: 138 MMOL/L (ref 136–145)
URATE SERPL-MCNC: 4 MG/DL (ref 2–6.8)
WBC # BLD AUTO: 11.09 THOUSAND/UL (ref 4.31–10.16)

## 2022-07-14 PROCEDURE — 99395 PREV VISIT EST AGE 18-39: CPT | Performed by: INTERNAL MEDICINE

## 2022-07-14 PROCEDURE — 99213 OFFICE O/P EST LOW 20 MIN: CPT | Performed by: INTERNAL MEDICINE

## 2022-07-14 PROCEDURE — 82240 BILE ACIDS CHOLYLGLYCINE: CPT

## 2022-07-14 PROCEDURE — 80053 COMPREHEN METABOLIC PANEL: CPT

## 2022-07-14 PROCEDURE — 82570 ASSAY OF URINE CREATININE: CPT

## 2022-07-14 PROCEDURE — 85025 COMPLETE CBC W/AUTO DIFF WBC: CPT

## 2022-07-14 PROCEDURE — 84550 ASSAY OF BLOOD/URIC ACID: CPT

## 2022-07-14 PROCEDURE — PNV: Performed by: OBSTETRICS & GYNECOLOGY

## 2022-07-14 PROCEDURE — 3725F SCREEN DEPRESSION PERFORMED: CPT | Performed by: INTERNAL MEDICINE

## 2022-07-14 PROCEDURE — 36415 COLL VENOUS BLD VENIPUNCTURE: CPT | Performed by: OBSTETRICS & GYNECOLOGY

## 2022-07-14 PROCEDURE — 84156 ASSAY OF PROTEIN URINE: CPT

## 2022-07-14 PROCEDURE — 82105 ALPHA-FETOPROTEIN SERUM: CPT | Performed by: OBSTETRICS & GYNECOLOGY

## 2022-07-14 RX ORDER — LORATADINE 10 MG/1
10 TABLET ORAL DAILY
Qty: 30 TABLET | Refills: 3 | Status: SHIPPED | OUTPATIENT
Start: 2022-07-14 | End: 2022-08-05

## 2022-07-14 NOTE — PROGRESS NOTES
16w5d  Pap 2022 Negative  GC/CT: negative/ negative   PN1 Labs: 2022 early glucose @ 206   Patient is type 2 DM on insulin  Blood Type: A  Positive :   MFM Level 1:2022  MFM Level 2:  AFP:   28 Week Labs:  TDap:  Flu:  GBS:   Blue Folder:  Yellow Folder:  Ped Referral :  Breast pump:  L&D forms:  Delivery consent:     Patient reports no fetal movements yet   Patient reports no lost of fluids and no cramping or vaginal bleeding at this time

## 2022-07-14 NOTE — PROGRESS NOTES
Assessment/Plan:    1  Annual physical exam    2  Type 2 diabetes mellitus without complication, without long-term current use of insulin ([facility])  Assessment & Plan:    Lab Results   Component Value Date    HGBA1C 5 9 (H) 07/07/2022   Diabetes mellitus well controlled  No hypoglycemia  Discussed with the patient symptoms of hypoglycemia and  its treatment  Will continue present dose of metformin and Levemir insulin  She is now 17 weeks pregnant  Also has been followed by nurse practitioner for diabetes mellitus due to pregnancy  Patient to call me if develops less than 80 blood sugar  She will be seeing endocrinologist     Orders:  -     Comprehensive metabolic panel; Future  -     Hemoglobin A1C; Future    3  Hypertriglyceridemia  Assessment & Plan:  Her cholesterol 150, triglyceride 94, HDL 57, LDL 74  Due to pregnancy I advised patient is to discontinue fish for for now  as triglyceride is significantly improved and she has been watching diet very closely  Continue low-cholesterol low saturated diet  Orders:  -     Comprehensive metabolic panel; Future  -     Lipid panel; Future    4  BMI 30 0-30 9,adult  Assessment & Plan:  Patient  was advised to decrease portion size  Advised to decrease carb, sugar, cholesterol intake  Advised to exercise 3-5 times per week  Advised to lose weight  5  Microalbuminuria  Assessment & Plan:  Her microalbuminuria resolved  Level decreased from 30-16  Not on any Ace inhibitor or arbs due to being pregnant  6  16 weeks gestation of pregnancy  Assessment & Plan:  Patient has been followed by OBGYN and also nurse practitioner for diabetes mellitus  7  Skin rash  Assessment & Plan:  She developed a rash more on the arms then  legs 2 weeks ago  She went to urgent care where she was prescribed prednisone for 5 days with some improvement but did not resolved  Denies any fever or chills or sore throat  Denies new powder, detergent, soap    Has some itching  Possible  contact dermatitis  I called the dermatologist and got appointment to see him today  Subjective:  Patient presents for follow-up  Patient ID: Cruz Dhillon is a 36 y o  female  HPI   32-year-old white female patient presents for follow-up her medical problems  She denies any chest pain, shortness of breath, pain in abdomen  Denies any cough, fever, chills  Denies any nausea vomiting, diarrhea  She has a rash on arms and legs more on the arms and legs  She was seen at urgent care was given prednisone for 5 days with some improvement but not resolved  Has some itching  Does not know how it happened  Denies any new soap, detergent, powder  She was also seen by OBGYN as she is now 16 weeks pregnant  Also seen by nurse practitioner for diabetes mellitus  She has an appointment to see endocrinologist     The following portions of the patient's history were reviewed and updated as appropriate:     Past Medical History:  She has a past medical history of 16 weeks gestation of pregnancy (7/14/2022), Annual physical exam (7/14/2022), Asthma, BMI 29 0-29 9,adult (5/18/2021), BMI 30 0-30 9,adult (5/6/2021), BMI 31 0-31 9,adult (8/24/2021), BMI 33 0-33 9,adult (12/14/2021), Constipation (12/14/2021), COVID-19 (5/6/2021), Diabetes mellitus ([facility] [address] DM2 (diabetes mellitus, type 2) ([facility] [address] ), Elevated blood pressure reading in office without diagnosis of hypertension (3/31/2022), Elevated cortisol level, Hand pain, right, High triglycerides, Hypertriglyceridemia (5/2/2021), Microalbuminuria (5/18/2021), Mild intermittent asthma without complication (9/5/1125), Obesity, Rhinitis, allergic (5/6/2021), Sinobronchitis, Skin rash (7/14/2022), and Type 2 diabetes mellitus ([facility] [address] )  ,  _______________________________________________________________________  Past Surgical History:   has a past surgical history that includes Cholecystectomy and [place] tooth extraction  ,  _______________________________________________________________________  Family History:  family history includes COPD in her father; Diabetes in her mother; Fibromyalgia in her father; Heart attack in her maternal grandfather and maternal grandmother; Heart disease in her maternal grandfather and maternal grandmother; Hypertension in her mother; No Known Problems in her brother and paternal grandmother; Prostate cancer in her paternal grandfather; Stroke in her maternal grandmother ,  _______________________________________________________________________  Social History:   reports that she has never smoked  She has never used smokeless tobacco  She reports previous alcohol use  She reports that she does not use drugs  ,  _______________________________________________________________________  Allergies:  is allergic to latex     _______________________________________________________________________  Current Outpatient Medications   Medication Sig Dispense Refill    BD Pen Needle Ngoc 2nd Gen 32G X 4 MM MISC USE IN THE MORNING USE 1 NEEDLE ONCE DAILY  100 each 0    Blood Glucose Monitoring Suppl (OneTouch Verio Flex System) w/Device KIT Dispense 1 kit per insurance formulary  1 kit 0    cholecalciferol (VITAMIN D3) 400 units tablet Take 400 Units by mouth daily      Continuous Blood Gluc  (Dexcom G6 ) JACINDA Use as directed  1 each 0    Continuous Blood Gluc Sensor (Dexcom G6 Sensor) MISC 1 box=1 month supply or 3 sensors, use 1 sensor every 10 days  1 each 12    Continuous Blood Gluc Transmit (Dexcom G6 Transmitter) MISC Transmitter change every 90 days   1 each 3    Cranberry 125 MG TABS Take by mouth      insulin detemir (LEVEMIR FLEXTOUCH) 100 Units/mL injection pen Inject 10 Units under the skin daily at bedtime 15 mL 0    metFORMIN (GLUCOPHAGE-XR) 500 mg 24 hr tablet Take 4 tablets (2,000 mg total) by mouth daily with dinner 360 tablet 1    Omega-3 Fatty Acids (fish oil) 1,000 mg Take 2 capsules by mouth see administration instructions 2 cap  qpm       OneTouch Delica Lancets 73A MISC Use 6 a day or as directed  T2DM  150 each 6    OneTouch Verio test strip Test 6 times a day and as instructed  T2DM and pregnancy  150 each 6    Prenatal Vit-Fe Fumarate-FA (PRENATAL PO) Take by mouth      loratadine (CLARITIN) 10 mg tablet Take 1 tablet (10 mg total) by mouth daily 30 tablet 3     No current facility-administered medications for this visit      _______________________________________________________________________  Review of Systems   Constitutional: Negative for chills, fatigue and fever  HENT: Negative for congestion, ear pain, hearing loss, nosebleeds, sinus pain, sore throat and trouble swallowing  Eyes: Negative for discharge, redness and visual disturbance  Respiratory: Negative for cough, chest tightness and shortness of breath  Cardiovascular: Negative for chest pain and palpitations  Gastrointestinal: Negative for abdominal pain, blood in stool, constipation, diarrhea, nausea and vomiting  Genitourinary: Negative for dysuria, flank pain, frequency and hematuria  Musculoskeletal: Negative for arthralgias, myalgias and neck pain  Skin: Positive for rash (On arms and legs)  Negative for color change  Neurological: Negative for dizziness, speech difficulty, weakness and headaches  Hematological: Does not bruise/bleed easily  Psychiatric/Behavioral: Negative for agitation and behavioral problems  Objective:  Vitals:    07/14/22 0953   BP: 128/80   Pulse: 103   Resp: 16   Temp: 98 2 °F (36 8 °C)   SpO2: 98%   Weight: 87 1 kg (192 lb)   Height: 5' 7" (1 702 m)     Body mass index is 30 07 kg/m²  Physical Exam  Vitals and nursing note reviewed  Constitutional:       General: She is not in acute distress  Appearance: Normal appearance  HENT:      Head: Normocephalic and atraumatic        Right Ear: Ear canal and external ear normal  Left Ear: Ear canal and external ear normal       Nose: Nose normal       Mouth/Throat:      Mouth: Mucous membranes are moist    Eyes:      General: No scleral icterus  Right eye: No discharge  Left eye: No discharge  Extraocular Movements: Extraocular movements intact  Conjunctiva/sclera: Conjunctivae normal    Cardiovascular:      Rate and Rhythm: Normal rate and regular rhythm  Pulses: Normal pulses  Heart sounds: No murmur heard  Pulmonary:      Effort: Pulmonary effort is normal  No respiratory distress  Breath sounds: Normal breath sounds  Abdominal:      General: Bowel sounds are normal       Palpations: Abdomen is soft  Tenderness: There is no abdominal tenderness  Musculoskeletal:         General: Normal range of motion  Cervical back: Normal range of motion and neck supple  No muscular tenderness  Right lower leg: No edema  Left lower leg: No edema  Skin:     General: Skin is warm  Findings: Rash (She has some maculopapular rash on both upper extremities and some lower extremities no vesicles  No discharge ) present  Neurological:      General: No focal deficit present  Mental Status: She is alert and oriented to person, place, and time  Motor: No weakness  Coordination: Coordination normal    Psychiatric:         Mood and Affect: Mood normal          Behavior: Behavior normal          I spent 30 minutes with the patient today    More than 50% time spent for reviewing of external notes, reviewing of the results of diagnostics test, management of care, patient education and ordering of test

## 2022-07-14 NOTE — PROGRESS NOTES
Problem   16 Weeks Gestation of Pregnancy    Blood Type: A +        Pap 2022  Neg  GC/CT Neg  PN1 Labs: completed ,    H&H: 13 39 2  28 Week Labs:  Level 1:22 & 7/15/22  Level 2:  Tdap:  Flu:   GBS swab:     Blue folder:  Yellow folder:     Breast pump order:  L&D forms:    Delivery consent:   IOL:            16 Weeks Gestation of Pregnancy (Resolved)     16 weeks gestation of pregnancy  Jennifer Jay is a 36 y o   16w5d  TWG -6 35 kg (-14 lb), starting to be able to eat, has not had an appetite  Feels well  Denies LOF/CTX/VB  AFP ordered   C/o rash that developed 2-3 weeks ago on her right arm, pt takes insulin and at first thought an allergy to insulin or baby ASA  Rash has since spread to both arms and both legs  Rash is fine in texture and slightly raised, no pustules noted, no infectious etiology suspected,  Pt was seen at Urgent care and was tx with prednisione which helped a small amount,seen today by Derm today, told her it was PUUPS, prescribed Halobetrol cream BID to arms and legs  There is no rash on her torso  Patient was doing yard work prior to this rash occurring  Conservative management at this time with open male bad as, skin moisturization, steroid cream that was ordered by [facility], Claritin 10 mg over-the-counter daily, Benadryl if needed for severe itching  Labs for bile acids, and CMP ordered  Patient advised to call if situation gets worse or is not manageable by these interventions  Vaccines discussed    labor precautions reviewed  Encouraged adequate hydration and nutrition  Pregnancy Essential guide and [facility] Me center web site recommended

## 2022-07-14 NOTE — ASSESSMENT & PLAN NOTE
Lab Results   Component Value Date    HGBA1C 5 9 (H) 07/07/2022   Diabetes mellitus well controlled  No hypoglycemia  Discussed with the patient symptoms of hypoglycemia and  its treatment  Will continue present dose of metformin and Levemir insulin  She is now 17 weeks pregnant  Also has been followed by nurse practitioner for diabetes mellitus due to pregnancy  Patient to call me if develops less than 80 blood sugar    She will be seeing endocrinologist

## 2022-07-14 NOTE — PATIENT INSTRUCTIONS
10% - bad control"> 10% - bad control,Hemoglobin A1c (HbA1c) greater than 10% indicating poor diabetic control,Haemoglobin A1c greater than 10% indicating poor diabetic control">   Diabetes Mellitus Type 2 in Adults, Ambulatory Care   GENERAL INFORMATION:   Diabetes mellitus type 2  is a disease that affects how your body uses glucose (sugar)  Insulin helps move sugar out of the blood so it can be used for energy  Normally, when the blood sugar level increases, the pancreas makes more insulin  Type 2 diabetes develops because either the body cannot make enough insulin, or it cannot use the insulin correctly  After many years, your pancreas may stop making insulin  Common symptoms include the following:   More hunger or thirst than usual     Frequent urination     Weight loss without trying     Blurred vision  Seek immediate care for the following symptoms:   Severe abdominal pain, or pain that spreads to your back  You may also be vomiting  Trouble staying awake or focusing    Shaking or sweating    Blurred or double vision    Breath has a fruity, sweet smell    Breathing is deep and labored, or rapid and shallow    Heartbeat is fast and weak  Treatment for diabetes mellitus type 2  includes keeping your blood sugar at a normal level  You must eat the right foods, and exercise regularly  You may also need medicine if you cannot control your blood sugar level with nutrition and exercise  Manage diabetes mellitus type 2:   Check your blood sugar level  You will be taught how to check a small drop of blood in a glucose monitor  Ask your healthcare provider when and how often to check during the day  Ask your healthcare provider what your blood sugar levels should be when you check them  Keep track of carbohydrates (sugar and starchy foods)  Your blood sugar level can get too high if you eat too many carbohydrates   Your dietitian will help you plan meals and snacks that have the right amount of carbohydrates  Eat low-fat foods  Some examples are skinless chicken and low-fat milk  Eat less sodium (salt)  Some examples of high-sodium foods to limit are soy sauce, potato chips, and soup  Do not add salt to food you cook  Limit your use of table salt  Eat high-fiber foods  Foods that are a good source of fiber include vegetables, whole grain bread, and beans  Limit alcohol  Alcohol affects your blood sugar level and can make it harder to manage your diabetes  Women should limit alcohol to 1 drink a day  Men should limit alcohol to 2 drinks a day  A drink of alcohol is 12 ounces of beer, 5 ounces of wine, or 1½ ounces of liquor  Get regular exercise  Exercise can help keep your blood sugar level steady, decrease your risk of heart disease, and help you lose weight  Exercise for at least 30 minutes, 5 days a week  Include muscle strengthening activities 2 days each week  Work with your healthcare provider to create an exercise plan  Check your feet each day  for injuries or open sores  Ask your healthcare provider for activities you can do if you have an open sore  Quit smoking  If you smoke, it is never too late to quit  Smoking can worsen the problems that may occur with diabetes  Ask your healthcare provider for information about how to stop smoking if you are having trouble quitting  Ask about your weight:  Ask healthcare providers if you need to lose weight, and how much to lose  Ask them to help you with a weight loss program  Even a 10 to 15 pound weight loss can help you manage your blood sugar level  Carry medical alert identification  Wear medical alert jewelry or carry a card that says you have diabetes  Ask your healthcare provider where to get these items  Ask about vaccines  Diabetes puts you at risk of serious illness if you get the flu, pneumonia, or hepatitis   Ask your healthcare provider if you should get a flu, pneumonia, or hepatitis B vaccine, and when to get the vaccine  Follow up with your healthcare provider as directed:  Write down your questions so you remember to ask them during your visits  CARE AGREEMENT:   You have the right to help plan your care  Learn about your health condition and how it may be treated  Discuss treatment options with your caregivers to decide what care you want to receive  You always have the right to refuse treatment  The above information is an  only  It is not intended as medical advice for individual conditions or treatments  Talk to your doctor, nurse or pharmacist before following any medical regimen to see if it is safe and effective for you  © 2014 [address] is for End User's use only and may not be sold, redistributed or otherwise used for commercial purposes  All illustrations and images included in CareNotes® are the copyrighted property of [employer]  or [employer]  Patient was advised to continue present medications  discussed with the patient medications and laboratory data in detail  Follow-up with me in 3 months or as advised  If any blood test was ordered please do 1 week prior to next appointment unless advise to get earlier  If you have any questions please call the office [phone] Patient was advised to continue present medications  discussed with the patient medications and laboratory data in detail  Follow-up with me in 3 months or as advised  If any blood test was ordered please do 1 week prior to next appointment unless advise to get earlier    If you have any questions please call the office [phone]

## 2022-07-14 NOTE — PROGRESS NOTES
[facility] INTERNAL MEDICINE    NAME: Donnella Essex Reitenauer  AGE: 36 y o  SEX: female  : 1990     DATE: 2022     Assessment and Plan:     Problem List Items Addressed This Visit        Endocrine    Type 2 diabetes mellitus without complication ([facility] [address] ) - Primary       Lab Results   Component Value Date    HGBA1C 5 9 (H) 2022   Diabetes mellitus well controlled  No hypoglycemia  Discussed with the patient symptoms of hypoglycemia and  its treatment  Will continue present dose of metformin and Levemir insulin  She is now 17 weeks pregnant  Also has been followed by nurse practitioner for diabetes mellitus due to pregnancy  Patient to call me if develops less than 80 blood sugar  She will be seeing endocrinologist          Relevant Orders    Comprehensive metabolic panel    Hemoglobin A1C       Musculoskeletal and Integument    Skin rash     She developed a rash more on the arms then  legs 2 weeks ago  She went to urgent care where she was prescribed prednisone for 5 days with some improvement but did not resolved  Denies any fever or chills or sore throat  Denies new powder, detergent, soap  Has some itching  Possible  contact dermatitis  I called the dermatologist and got appointment to see him today  Other    Hypertriglyceridemia     Her cholesterol 150, triglyceride 94, HDL 57, LDL 74  Due to pregnancy I advised patient is to discontinue fish for for now  as triglyceride is significantly improved and she has been watching diet very closely  Continue low-cholesterol low saturated diet  Relevant Orders    Comprehensive metabolic panel    Lipid panel    BMI 30 0-30 9,adult     Patient  was advised to decrease portion size  Advised to decrease carb, sugar, cholesterol intake  Advised to exercise 3-5 times per week  Advised to lose weight           Microalbuminuria     Her microalbuminuria resolved  Level decreased from 30-16  Not on any Ace inhibitor or arbs due to being pregnant  16 weeks gestation of pregnancy     Patient has been followed by OBGYN and also nurse practitioner for diabetes mellitus  Immunizations and preventive care screenings were discussed with patient today  Appropriate education was printed on patient's after visit summary  Counseling:  · Alcohol/drug use: discussed moderation in alcohol intake, the recommendations for healthy alcohol use, and avoidance of illicit drug use  Return in about 3 months (around 10/14/2022)  Chief Complaint:     Chief Complaint   Patient presents with    Follow-up     Three month      History of Present Illness:     Adult Annual Physical   Patient here for a comprehensive physical exam  The patient reports problems - Complain of rash on arms and legs  Diet and Physical Activity  · Diet/Nutrition: well balanced diet  · Exercise: walking  Depression Screening  PHQ-2/9 Depression Screening    Little interest or pleasure in doing things: 0 - not at all  Feeling down, depressed, or hopeless: 0 - not at all  PHQ-2 Score: 0  PHQ-2 Interpretation: Negative depression screen       General Health  · Sleep: sleeps well  · Hearing: normal - bilateral   · Vision: no vision problems  · Dental: regular dental visits  /GYN Health  · Last menstrual period: she is pregnant  · Contraceptive method: She is pregnant  · History of STDs?: no      Review of Systems:     Review of Systems   Constitutional: Negative for chills, fatigue and fever  HENT: Negative for congestion, ear pain, hearing loss, nosebleeds, sinus pain, sore throat and trouble swallowing  Eyes: Negative for discharge, redness and visual disturbance  Respiratory: Negative for cough, chest tightness and shortness of breath  Cardiovascular: Negative for chest pain and palpitations     Gastrointestinal: Negative for abdominal pain, blood in stool, constipation, diarrhea, nausea and vomiting  Genitourinary: Negative for dysuria, flank pain, frequency and hematuria  Musculoskeletal: Negative for arthralgias, myalgias and neck pain  Skin: Positive for rash (Legs and arm more on arms)  Negative for color change  Neurological: Negative for dizziness, speech difficulty, weakness and headaches  Hematological: Does not bruise/bleed easily  Psychiatric/Behavioral: Negative for agitation and behavioral problems           Past Medical History:     Past Medical History:   Diagnosis Date    16 weeks gestation of pregnancy 7/14/2022    Asthma     BMI 29 0-29 9,adult 5/18/2021    BMI 30 0-30 9,adult 5/6/2021    BMI 31 0-31 9,adult 8/24/2021    BMI 33 0-33 9,adult 12/14/2021    Constipation 12/14/2021    COVID-19 5/6/2021    Diabetes mellitus ([facility] [address] )     DM2 (diabetes mellitus, type 2) ([facility])     Elevated blood pressure reading in office without diagnosis of hypertension 3/31/2022    Elevated cortisol level     Hand pain, right     High triglycerides     Hypertriglyceridemia 5/2/2021    Microalbuminuria 5/18/2021    Mild intermittent asthma without complication 9/6/7904    Obesity     Rhinitis, allergic 5/6/2021    Sinobronchitis     Skin rash 7/14/2022    Type 2 diabetes mellitus (HCC)       Past Surgical History:     Past Surgical History:   Procedure Laterality Date    CHOLECYSTECTOMY      WISDOM TOOTH EXTRACTION        Social History:     Social History     Socioeconomic History    Marital status: /Civil Union     Spouse name: None    Number of children: None    Years of education: None    Highest education level: None   Occupational History    None   Tobacco Use    Smoking status: Never Smoker    Smokeless tobacco: Never Used   Vaping Use    Vaping Use: Never used   Substance and Sexual Activity    Alcohol use: Not Currently     Comment: occasionally    Drug use: No    Sexual activity: Yes     Partners: Male Birth control/protection: OCP   Other Topics Concern    None   Social History Narrative    Lives with     Annual eye exam: Advise to see ophthalmology    Pap smear by her gyn    - [facility]     Social Determinants of Health     Financial Resource Strain: Not on file   Food Insecurity: Not on file   Transportation Needs: Not on file   Physical Activity: Not on file   Stress: Not on file   Social Connections: Not on file   Intimate Partner Violence: Not on file   Housing Stability: Not on file      Family History:     Family History   Problem Relation Age of Onset    Diabetes Mother     Hypertension Mother     COPD Father     Fibromyalgia Father     No Known Problems Brother     Heart disease Maternal Grandmother     Stroke Maternal Grandmother     Heart attack Maternal Grandmother     Heart disease Maternal Grandfather     Heart attack Maternal Grandfather     No Known Problems Paternal Grandmother     Prostate cancer Paternal Grandfather       Current Medications:     Current Outpatient Medications   Medication Sig Dispense Refill    BD Pen Needle Ngoc 2nd Gen 32G X 4 MM MISC USE IN THE MORNING USE 1 NEEDLE ONCE DAILY  100 each 0    Blood Glucose Monitoring Suppl (OneTouch Verio Flex System) w/Device KIT Dispense 1 kit per insurance formulary  1 kit 0    cholecalciferol (VITAMIN D3) 400 units tablet Take 400 Units by mouth daily      Continuous Blood Gluc  (Dexcom G6 ) JACINDA Use as directed  1 each 0    Continuous Blood Gluc Sensor (Dexcom G6 Sensor) MISC 1 box=1 month supply or 3 sensors, use 1 sensor every 10 days  1 each 12    Continuous Blood Gluc Transmit (Dexcom G6 Transmitter) MISC Transmitter change every 90 days   1 each 3    Cranberry 125 MG TABS Take by mouth      insulin detemir (LEVEMIR FLEXTOUCH) 100 Units/mL injection pen Inject 10 Units under the skin daily at bedtime 15 mL 0    metFORMIN (GLUCOPHAGE-XR) 500 mg 24 hr tablet Take 4 tablets (2,000 mg total) by mouth daily with dinner 360 tablet 1    Omega-3 Fatty Acids (fish oil) 1,000 mg Take 2 capsules by mouth see administration instructions 2 cap  qpm       OneTouch Delica Lancets 96Q MISC Use 6 a day or as directed  T2DM  150 each 6    OneTouch Verio test strip Test 6 times a day and as instructed  T2DM and pregnancy  150 each 6    Prenatal Vit-Fe Fumarate-FA (PRENATAL PO) Take by mouth      loratadine (CLARITIN) 10 mg tablet Take 1 tablet (10 mg total) by mouth daily 30 tablet 3     No current facility-administered medications for this visit  Allergies: Allergies   Allergen Reactions    Latex       Physical Exam:     /80   Pulse 103   Temp 98 2 °F (36 8 °C)   Resp 16   Ht 5' 7" (1 702 m)   Wt 87 1 kg (192 lb)   LMP 03/10/2022   SpO2 98%   BMI 30 07 kg/m²     Physical Exam  Vitals and nursing note reviewed  Constitutional:       General: She is not in acute distress  Appearance: She is well-developed  She is obese  HENT:      Head: Normocephalic and atraumatic  Right Ear: Tympanic membrane, ear canal and external ear normal       Left Ear: Tympanic membrane, ear canal and external ear normal       Nose: Nose normal       Mouth/Throat:      Mouth: Mucous membranes are moist       Pharynx: Oropharynx is clear  Eyes:      General: No scleral icterus  Right eye: No discharge  Left eye: No discharge  Extraocular Movements: Extraocular movements intact  Conjunctiva/sclera: Conjunctivae normal       Pupils: Pupils are equal, round, and reactive to light  Cardiovascular:      Rate and Rhythm: Normal rate and regular rhythm  Pulses: Normal pulses  Heart sounds: No murmur heard  Pulmonary:      Effort: Pulmonary effort is normal  No respiratory distress  Breath sounds: Normal breath sounds  Abdominal:      General: Bowel sounds are normal       Palpations: Abdomen is soft  Tenderness: There is no abdominal tenderness  Musculoskeletal:         General: Normal range of motion  Cervical back: Normal range of motion and neck supple  Right lower leg: No edema  Left lower leg: No edema  Skin:     General: Skin is warm and dry  Findings: Rash (Has a macular papular rash mainly on the upper extremities and few lesions on lower extremities  No vesicles  ) present  Neurological:      General: No focal deficit present  Mental Status: She is alert and oriented to person, place, and time  Motor: No weakness        Coordination: Coordination normal       Gait: Gait normal    Psychiatric:         Mood and Affect: Mood normal          Behavior: Behavior normal           Robert Benjamin MD   [address] [facility]

## 2022-07-15 ENCOUNTER — ROUTINE PRENATAL (OUTPATIENT)
Dept: PERINATAL CARE | Facility: OTHER | Age: 32
End: 2022-07-15
Payer: COMMERCIAL

## 2022-07-15 VITALS
HEART RATE: 98 BPM | BODY MASS INDEX: 30.67 KG/M2 | SYSTOLIC BLOOD PRESSURE: 116 MMHG | WEIGHT: 195.4 LBS | DIASTOLIC BLOOD PRESSURE: 80 MMHG | HEIGHT: 67 IN

## 2022-07-15 DIAGNOSIS — E11.9 TYPE 2 DIABETES MELLITUS WITHOUT COMPLICATION, UNSPECIFIED WHETHER LONG TERM INSULIN USE (HCC): ICD-10-CM

## 2022-07-15 DIAGNOSIS — O24.112 PRE-EXISTING TYPE 2 DIABETES MELLITUS WITH HYPERGLYCEMIA DURING PREGNANCY IN SECOND TRIMESTER (HCC): Primary | ICD-10-CM

## 2022-07-15 DIAGNOSIS — E11.65 PRE-EXISTING TYPE 2 DIABETES MELLITUS WITH HYPERGLYCEMIA DURING PREGNANCY IN SECOND TRIMESTER (HCC): Primary | ICD-10-CM

## 2022-07-15 DIAGNOSIS — Z36.3 ENCOUNTER FOR ANTENATAL SCREENING FOR MALFORMATION USING ULTRASOUND: ICD-10-CM

## 2022-07-15 DIAGNOSIS — Z3A.16 16 WEEKS GESTATION OF PREGNANCY: ICD-10-CM

## 2022-07-15 PROCEDURE — 76805 OB US >/= 14 WKS SNGL FETUS: CPT | Performed by: OBSTETRICS & GYNECOLOGY

## 2022-07-15 PROCEDURE — 3079F DIAST BP 80-89 MM HG: CPT | Performed by: OBSTETRICS & GYNECOLOGY

## 2022-07-15 PROCEDURE — 99213 OFFICE O/P EST LOW 20 MIN: CPT | Performed by: OBSTETRICS & GYNECOLOGY

## 2022-07-15 PROCEDURE — 3074F SYST BP LT 130 MM HG: CPT | Performed by: OBSTETRICS & GYNECOLOGY

## 2022-07-15 NOTE — ASSESSMENT & PLAN NOTE
Her cholesterol 150, triglyceride 94, HDL 57, LDL 74  Due to pregnancy I advised patient is to discontinue fish for for now  as triglyceride is significantly improved and she has been watching diet very closely  Continue low-cholesterol low saturated diet

## 2022-07-15 NOTE — ASSESSMENT & PLAN NOTE
She developed a rash more on the arms then  legs 2 weeks ago  She went to urgent care where she was prescribed prednisone for 5 days with some improvement but did not resolved  Denies any fever or chills or sore throat  Denies new powder, detergent, soap  Has some itching  Possible  contact dermatitis  I called the dermatologist and got appointment to see him today

## 2022-07-15 NOTE — ASSESSMENT & PLAN NOTE
Her microalbuminuria resolved  Level decreased from 30-16  Not on any Ace inhibitor or arbs due to being pregnant

## 2022-07-15 NOTE — PROGRESS NOTES
The patient was seen today for an ultrasound  Please see ultrasound report (located under Ob Procedures) for additional details  Thank you very much for allowing us to participate in the care of this very nice patient  Should you have any questions, please do not hesitate to contact me  Leobardo Ramos MD [address] [facility]  Attending Physician, Tonia

## 2022-07-17 LAB
2ND TRIMESTER 4 SCREEN SERPL-IMP: NORMAL
AFP ADJ MOM SERPL: 1.13
AFP INTERP AMN-IMP: NORMAL
AFP INTERP SERPL-IMP: NORMAL
AFP INTERP SERPL-IMP: NORMAL
AFP SERPL-MCNC: 44 NG/ML
AGE AT DELIVERY: 30.1 YR
BILE AC SERPL-SCNC: 1.6 UMOL/L (ref 0–10)
GA METHOD: NORMAL
GA: 18 WEEKS
IDDM PATIENT QL: NO
MULTIPLE PREGNANCY: NO
NEURAL TUBE DEFECT RISK FETUS: 8106 %

## 2022-07-21 ENCOUNTER — TELEMEDICINE (OUTPATIENT)
Dept: PERINATAL CARE | Facility: CLINIC | Age: 32
End: 2022-07-21
Payer: COMMERCIAL

## 2022-07-21 VITALS — HEIGHT: 67 IN | BODY MASS INDEX: 30.61 KG/M2 | WEIGHT: 195 LBS

## 2022-07-21 DIAGNOSIS — T88.7XXA HYPERSENSITIVITY REACTION AT INJECTION SITE: ICD-10-CM

## 2022-07-21 DIAGNOSIS — O24.112 PRE-EXISTING TYPE 2 DIABETES MELLITUS WITH HYPERGLYCEMIA DURING PREGNANCY IN SECOND TRIMESTER (HCC): Primary | ICD-10-CM

## 2022-07-21 DIAGNOSIS — Z3A.17 17 WEEKS GESTATION OF PREGNANCY: ICD-10-CM

## 2022-07-21 DIAGNOSIS — T80.89XA HYPERSENSITIVITY REACTION AT INJECTION SITE: ICD-10-CM

## 2022-07-21 DIAGNOSIS — E11.65 PRE-EXISTING TYPE 2 DIABETES MELLITUS WITH HYPERGLYCEMIA DURING PREGNANCY IN SECOND TRIMESTER (HCC): Primary | ICD-10-CM

## 2022-07-21 DIAGNOSIS — O99.212 OBESITY AFFECTING PREGNANCY IN SECOND TRIMESTER: ICD-10-CM

## 2022-07-21 PROCEDURE — 99214 OFFICE O/P EST MOD 30 MIN: CPT | Performed by: NURSE PRACTITIONER

## 2022-07-21 RX ORDER — INSULIN GLARGINE 100 [IU]/ML
14 INJECTION, SOLUTION SUBCUTANEOUS
Qty: 15 ML | Refills: 0 | Status: SHIPPED | OUTPATIENT
Start: 2022-07-21 | End: 2022-07-28 | Stop reason: CLARIF

## 2022-07-21 NOTE — PATIENT INSTRUCTIONS
-A1c 5 9% at goal   -Due to FBS>90; increase Levemir to 16 units at 7:30 PM daily  Due to site reaction; will switch to Lantus or insulin glargine pending insurance approval    -Continue Metformin XR 2000 mg with dinner    -Try to follow GDM diet 3 meals and 3 snacks a day  -Continue testing fasting; 2 hours post meal and with hypoglycemia   -Glucose goals fasting 60-90; 2 hours post meal 120 or less; overnight/before meals    -Continue reporting via flowsheet   -Contact Dexcom regarding CGM connection   -Continue walking no restriction from OB  -Fetal growth ultrasound as scheduled  -Fetal echo as scheduled     -Starting at 32 weeks gestation, NST twice a week and PRESTON weekly   -Continue follow up with OB and MFM as recommended   -Continue close contact with diabetes team

## 2022-07-21 NOTE — PROGRESS NOTES
Virtual Regular Visit    Verification of patient location:[place]    Patient is located in the following state in which I hold an active license [place]      Assessment/Plan:    Problem List Items Addressed This Visit        Endocrine    Pre-existing type 2 diabetes mellitus with hyperglycemia during pregnancy in second trimester ([facility] [address] ) - Primary     -A1c 5 9% at goal   -Due to FBS>90; increase Levemir to 16 units at 7:30 PM daily  Due to site reaction; will switch to Lantus or insulin glargine pending insurance approval    -Continue Metformin XR 2000 mg with dinner    -Try to follow GDM diet 3 meals and 3 snacks a day  -Continue testing fasting; 2 hours post meal and with hypoglycemia   -Glucose goals fasting 60-90; 2 hours post meal 120 or less; overnight/before meals    -Continue reporting via flowsheet   -Contact Dexcom regarding CGM connection   -Continue walking no restriction from OB  -Fetal growth ultrasound as scheduled  -Fetal echo as scheduled  -Starting at 28 weeks gestation, NST twice a week and PRESTON weekly   -Continue follow up with OB and MFM as recommended   -Continue close contact with diabetes team    Lab Results   Component Value Date    HGBA1C 5 9 (H) 07/07/2022            Relevant Medications    Insulin Glargine Solostar (Lantus SoloStar) 100 UNIT/ML SOPN       Other    BMI 30 0-30 9,adult    Relevant Medications    Insulin Glargine Solostar (Lantus SoloStar) 100 UNIT/ML SOPN    Obesity affecting pregnancy in second trimester     -First prenatal visit weight 208 lbs  -Current weight 195 lbs  -TWL -13 lbs  -Try to eat 3 meals and 3 snacks   -Follow up with dietitian            Relevant Medications    Insulin Glargine Solostar (Lantus SoloStar) 100 UNIT/ML SOPN    17 weeks gestation of pregnancy    Relevant Medications    Insulin Glargine Solostar (Lantus SoloStar) 100 UNIT/ML SOPN    Hypersensitivity reaction at injection site     -Reports welts at injection site with itching; denies difficulty breathing  Has tried alternative sites with same issue   -Script sent for Lantus pending insurance approval   -Has dermatology appointment today and encouraged to have injection sites evaluated during visit  Relevant Medications    Insulin Glargine Solostar (Lantus SoloStar) 100 UNIT/ML SOPN               Reason for visit is   Chief Complaint   Patient presents with    Virtual Regular Visit    Diabetes Type 2        Encounter provider Eleno Brown, [facility]    Provider located at [address] [place] [id number] [phone]      Recent Visits  Date Type Provider Dept   22 Office Visit Harrison Roy MD [address] Internal Med   Showing recent visits within past 7 days and meeting all other requirements  Today's Visits  Date Type Provider Dept   22 [id number] [facility], [facility] today's visits and meeting all other requirements  Future Appointments  No visits were found meeting these conditions  Showing future appointments within next 150 days and meeting all other requirements       The patient was identified by name and date of birth  Juan Miguel Pacheco was informed that this is a telemedicine visit and that the visit is being conducted through Telephone  My office door was closed  No one else was in the room  She acknowledged consent and understanding of privacy and security of the video platform  The patient has agreed to participate and understands they can discontinue the visit at any time  Virtual connection issue and patient agreed to a telephone visit  Patient is aware this is a billable service  Radha Plasencia is a 36 y o  female  16 5/7 weeks gestation T2DM on Levemir 14 units at 7:30-8 PM daily and Metformin XR  with dinner  Eating 3 meals a day but not having snacking  Walking 20-30 minutes daily   Testing fasting ans 2 hours post meal, reporting via glucose flowsheet  Reports delayed injection site reaction with welts and itching; no difficulty with breathing; discussed with her PCP and alternative sites tried  Pending insurance approval with switch to Lantus  A1c 5 9%  13 lbs weight loss; down 4 lbs in 1 month; needs follow up with dietitian  HPI     Past Medical History:   Diagnosis Date    16 weeks gestation of pregnancy 7/14/2022    Annual physical exam 7/14/2022    Asthma     BMI 29 0-29 9,adult 5/18/2021    BMI 30 0-30 9,adult 5/6/2021    BMI 31 0-31 9,adult 8/24/2021    BMI 33 0-33 9,adult 12/14/2021    Constipation 12/14/2021    COVID-19 5/6/2021    Diabetes mellitus ([address] )     DM2 (diabetes mellitus, type 2) ([facility])     Elevated blood pressure reading in office without diagnosis of hypertension 3/31/2022    Elevated cortisol level     Hand pain, right     High triglycerides     Hypertriglyceridemia 5/2/2021    Microalbuminuria 5/18/2021    Mild intermittent asthma without complication 7/4/7348    Obesity     Rhinitis, allergic 5/6/2021    Sinobronchitis     Skin rash 7/14/2022    Type 2 diabetes mellitus (HCC)        Past Surgical History:   Procedure Laterality Date    CHOLECYSTECTOMY      WISDOM TOOTH EXTRACTION         Current Outpatient Medications   Medication Sig Dispense Refill    Insulin Glargine Solostar (Lantus SoloStar) 100 UNIT/ML SOPN Inject 14 Units under the skin daily at bedtime At 7:30 PM daily  To replace Levemir due to injection site reaction  15 mL 0    BD Pen Needle Ngoc 2nd Gen 32G X 4 MM MISC USE IN THE MORNING USE 1 NEEDLE ONCE DAILY  100 each 0    Blood Glucose Monitoring Suppl (OneTouch Verio Flex System) w/Device KIT Dispense 1 kit per insurance formulary  1 kit 0    cholecalciferol (VITAMIN D3) 400 units tablet Take 400 Units by mouth daily      Continuous Blood Gluc  (Dexcom G6 ) JACINDA Use as directed   1 each 0    Continuous Blood Gluc Sensor (Dexcom G6 Sensor) MISC 1 box=1 month supply or 3 sensors, use 1 sensor every 10 days  1 each 12    Continuous Blood Gluc Transmit (Dexcom G6 Transmitter) MISC Transmitter change every 90 days  1 each 3    Cranberry 125 MG TABS Take by mouth      halobetasol (ULTRAVATE) 0 05 % cream       insulin detemir (LEVEMIR FLEXTOUCH) 100 Units/mL injection pen Inject 14 Units under the skin daily at bedtime 15 mL 0    loratadine (CLARITIN) 10 mg tablet Take 1 tablet (10 mg total) by mouth daily 30 tablet 3    metFORMIN (GLUCOPHAGE-XR) 500 mg 24 hr tablet Take 4 tablets (2,000 mg total) by mouth daily with dinner 360 tablet 1    Omega-3 Fatty Acids (fish oil) 1,000 mg Take 2 capsules by mouth see administration instructions 2 cap  qpm       OneTouch Delica Lancets 37B MISC Use 6 a day or as directed  T2DM  150 each 6    OneTouch Verio test strip Test 6 times a day and as instructed  T2DM and pregnancy  150 each 6    Prenatal Vit-Fe Fumarate-FA (PRENATAL PO) Take by mouth       No current facility-administered medications for this visit  Allergies   Allergen Reactions    Latex        Review of Systems   Constitutional: Positive for fatigue (due to picking up extra shifts)  HENT: Negative for congestion  Eyes: Negative for visual disturbance  Needs eye exam     Respiratory: Negative for cough and shortness of breath  Cardiovascular: Negative for chest pain, palpitations and leg swelling  Gastrointestinal: Negative for constipation, nausea and vomiting  Endocrine: Positive for polyuria  Negative for polydipsia and polyphagia  Genitourinary: Negative for difficulty urinating and vaginal bleeding  Neurological: Positive for headaches (improved)  Psychiatric/Behavioral: Negative for sleep disturbance  Video Exam  Refer to glucose flowsheet     Vitals:    07/21/22 1019   Weight: 88 5 kg (195 lb)   Height: 5' 7" (1 702 m)       Physical Exam   It was my intent to perform this visit via video technology but the patient was not able to do a video connection so the visit was completed via audio telephone only  I spent 30 minutes directly with the patient during this visit    [id number] verbally agrees to participate in [employer]  Pt is aware that [employer] could be limited without vital signs or the ability to perform a full hands-on physical exam  Suzie Bey understands she or the provider may request at any time to terminate the video visit and request the patient to seek care or treatment in person

## 2022-07-21 NOTE — ASSESSMENT & PLAN NOTE
-A1c 5 9% at goal   -Due to FBS>90; increase Levemir to 16 units at 7:30 PM daily  Due to site reaction; will switch to Lantus or insulin glargine pending insurance approval    -Continue Metformin XR 2000 mg with dinner    -Try to follow GDM diet 3 meals and 3 snacks a day  -Continue testing fasting; 2 hours post meal and with hypoglycemia   -Glucose goals fasting 60-90; 2 hours post meal 120 or less; overnight/before meals    -Continue reporting via flowsheet   -Contact Dexcom regarding CGM connection   -Continue walking no restriction from OB  -Fetal growth ultrasound as scheduled  -Fetal echo as scheduled     -Starting at 32 weeks gestation, NST twice a week and PRESTON weekly   -Continue follow up with OB and MFM as recommended   -Continue close contact with diabetes team    Lab Results   Component Value Date    HGBA1C 5 9 (H) 07/07/2022

## 2022-07-21 NOTE — ASSESSMENT & PLAN NOTE
-First prenatal visit weight 208 lbs  -Current weight 195 lbs  -TWL -13 lbs  -Try to eat 3 meals and 3 snacks   -Follow up with dietitian

## 2022-07-21 NOTE — ASSESSMENT & PLAN NOTE
-Reports welts at injection site with itching; denies difficulty breathing  Has tried alternative sites with same issue   -Script sent for Lantus pending insurance approval   -Has dermatology appointment today and encouraged to have injection sites evaluated during visit

## 2022-07-22 DIAGNOSIS — O24.112 PRE-EXISTING TYPE 2 DIABETES MELLITUS WITH HYPERGLYCEMIA DURING PREGNANCY IN SECOND TRIMESTER (HCC): ICD-10-CM

## 2022-07-22 DIAGNOSIS — O24.414 INSULIN CONTROLLED GESTATIONAL DIABETES MELLITUS (GDM) IN SECOND TRIMESTER: Primary | ICD-10-CM

## 2022-07-22 DIAGNOSIS — E11.65 PRE-EXISTING TYPE 2 DIABETES MELLITUS WITH HYPERGLYCEMIA DURING PREGNANCY IN SECOND TRIMESTER (HCC): ICD-10-CM

## 2022-07-22 RX ORDER — INSULIN GLARGINE-YFGN 100 [IU]/ML
14 INJECTION, SOLUTION SUBCUTANEOUS DAILY
Qty: 100 ML | Refills: 0 | Status: SHIPPED | OUTPATIENT
Start: 2022-07-22 | End: 2022-07-28 | Stop reason: SDUPTHER

## 2022-07-28 ENCOUNTER — TELEMEDICINE (OUTPATIENT)
Dept: PERINATAL CARE | Facility: CLINIC | Age: 32
End: 2022-07-28
Payer: COMMERCIAL

## 2022-07-28 DIAGNOSIS — O24.112 PRE-EXISTING TYPE 2 DIABETES MELLITUS WITH HYPERGLYCEMIA DURING PREGNANCY IN SECOND TRIMESTER (HCC): ICD-10-CM

## 2022-07-28 DIAGNOSIS — E11.65 PRE-EXISTING TYPE 2 DIABETES MELLITUS WITH HYPERGLYCEMIA DURING PREGNANCY IN SECOND TRIMESTER (HCC): Primary | ICD-10-CM

## 2022-07-28 DIAGNOSIS — Z3A.18 18 WEEKS GESTATION OF PREGNANCY: ICD-10-CM

## 2022-07-28 DIAGNOSIS — T80.89XA HYPERSENSITIVITY REACTION AT INJECTION SITE: ICD-10-CM

## 2022-07-28 DIAGNOSIS — O24.414 INSULIN CONTROLLED GESTATIONAL DIABETES MELLITUS (GDM) IN SECOND TRIMESTER: ICD-10-CM

## 2022-07-28 DIAGNOSIS — O99.212 OBESITY AFFECTING PREGNANCY IN SECOND TRIMESTER: ICD-10-CM

## 2022-07-28 DIAGNOSIS — K59.00 CONSTIPATION, UNSPECIFIED CONSTIPATION TYPE: ICD-10-CM

## 2022-07-28 DIAGNOSIS — O24.112 PRE-EXISTING TYPE 2 DIABETES MELLITUS WITH HYPERGLYCEMIA DURING PREGNANCY IN SECOND TRIMESTER (HCC): Primary | ICD-10-CM

## 2022-07-28 DIAGNOSIS — T88.7XXA HYPERSENSITIVITY REACTION AT INJECTION SITE: ICD-10-CM

## 2022-07-28 DIAGNOSIS — E11.65 PRE-EXISTING TYPE 2 DIABETES MELLITUS WITH HYPERGLYCEMIA DURING PREGNANCY IN SECOND TRIMESTER (HCC): ICD-10-CM

## 2022-07-28 PROCEDURE — G0108 DIAB MANAGE TRN  PER INDIV: HCPCS

## 2022-07-28 RX ORDER — INSULIN GLARGINE 100 [IU]/ML
14 INJECTION, SOLUTION SUBCUTANEOUS
Qty: 15 ML | Refills: 0 | Status: SHIPPED | OUTPATIENT
Start: 2022-07-28 | End: 2022-09-15 | Stop reason: SDUPTHER

## 2022-07-28 NOTE — PROGRESS NOTES
Virtual Regular Visit    Verification of patient location:    Patient is located in the following state in which I hold an active license [place]      Assessment/Plan:    Problem List Items Addressed This Visit        Endocrine    Pre-existing type 2 diabetes mellitus with hyperglycemia during pregnancy in second trimester ([address] ) - Primary       Other    Constipation    Obesity affecting pregnancy in second trimester    17 weeks gestation of pregnancy    Hypersensitivity reaction at injection site               Reason for visit is   Chief Complaint   Patient presents with    pre-existing type 2 diabetes     Patient Education    Virtual Regular Visit        Encounter provider Mahendra Cruz    Provider located at [address] [place] [address]  [address] [facility] [id number] [phone]      Recent Visits  Date Type Provider Dept   07/21/22 [address] [facility], [facility] recent visits within past 7 days and meeting all other requirements  Today's Visits  Date Type Provider Dept   07/28/22 1800 Rainer Meyer,[address] today's visits and meeting all other requirements  Future Appointments  No visits were found meeting these conditions  Showing future appointments within next 150 days and meeting all other requirements       The patient was identified by name and date of birth  Renee Jovel was informed that this is a telemedicine visit and that the visit is being conducted through [address] [place] [address] and patient was informed this is a secure, HIPAA-complaint platform  She agrees to proceed     My office door was closed  No one else was in the room  She acknowledged consent and understanding of privacy and security of the video platform  The patient has agreed to participate and understands they can discontinue the visit at any time  Patient is aware this is a billable service       Linda Melchor is a 36 y o pregnant female with a history of pre-existing type 2 diabetes  Gemma KENYON     Past Medical History:   Diagnosis Date    16 weeks gestation of pregnancy 7/14/2022    Annual physical exam 7/14/2022    Asthma     BMI 29 0-29 9,adult 5/18/2021    BMI 30 0-30 9,adult 5/6/2021    BMI 31 0-31 9,adult 8/24/2021    BMI 33 0-33 9,adult 12/14/2021    Constipation 12/14/2021    COVID-19 5/6/2021    Diabetes mellitus ([address] )     DM2 (diabetes mellitus, type 2) (HCC)     Elevated blood pressure reading in office without diagnosis of hypertension 3/31/2022    Elevated cortisol level     Hand pain, right     High triglycerides     Hypertriglyceridemia 5/2/2021    Microalbuminuria 5/18/2021    Mild intermittent asthma without complication 7/6/5326    Obesity     Rhinitis, allergic 5/6/2021    Sinobronchitis     Skin rash 7/14/2022    Type 2 diabetes mellitus (HCC)        Past Surgical History:   Procedure Laterality Date    CHOLECYSTECTOMY      WISDOM TOOTH EXTRACTION         Current Outpatient Medications   Medication Sig Dispense Refill    BD Pen Needle Ngoc 2nd Gen 32G X 4 MM MISC USE IN THE MORNING USE 1 NEEDLE ONCE DAILY  100 each 0    Blood Glucose Monitoring Suppl (OneTouch Verio Flex System) w/Device KIT Dispense 1 kit per insurance formulary  1 kit 0    cholecalciferol (VITAMIN D3) 400 units tablet Take 400 Units by mouth daily      Continuous Blood Gluc  (Dexcom G6 ) JACINDA Use as directed  1 each 0    halobetasol (ULTRAVATE) 0 05 % cream       insulin detemir (LEVEMIR FLEXTOUCH) 100 Units/mL injection pen Inject 14 Units under the skin daily at bedtime 15 mL 0    metFORMIN (GLUCOPHAGE-XR) 500 mg 24 hr tablet Take 4 tablets (2,000 mg total) by mouth daily with dinner 360 tablet 1    OneTouch Delica Lancets 56J MISC Use 6 a day or as directed  T2DM  150 each 6    OneTouch Verio test strip Test 6 times a day and as instructed  T2DM and pregnancy   150 each 6    Prenatal Vit-Fe Fumarate-FA (PRENATAL PO) Take by mouth      Continuous Blood Gluc Sensor (Dexcom G6 Sensor) MISC 1 box=1 month supply or 3 sensors, use 1 sensor every 10 days  (Patient not taking: Reported on 7/28/2022) 1 each 12    Continuous Blood Gluc Transmit (Dexcom G6 Transmitter) MISC Transmitter change every 90 days  (Patient not taking: Reported on 7/28/2022) 1 each 3    Cranberry 125 MG TABS Take by mouth (Patient not taking: Reported on 7/28/2022)      Insulin Glargine Solostar (Lantus SoloStar) 100 UNIT/ML SOPN Inject 14 Units under the skin daily at bedtime At 7:30 PM daily  To replace Levemir due to injection site reaction  (Patient not taking: Reported on 7/28/2022) 15 mL 0    Insulin Glargine-yfgn (Semglee, yfgn,) 100 UNIT/ML SOPN Inject 14 Units under the skin in the morning (Patient not taking: Reported on 7/28/2022) 100 mL 0    loratadine (CLARITIN) 10 mg tablet Take 1 tablet (10 mg total) by mouth daily (Patient not taking: Reported on 7/28/2022) 30 tablet 3    Omega-3 Fatty Acids (fish oil) 1,000 mg Take 2 capsules by mouth see administration instructions 2 cap  qpm  (Patient not taking: Reported on 7/28/2022)       No current facility-administered medications for this visit  Allergies   Allergen Reactions    Latex        Review of Systems    Video Exam    There were no vitals filed for this visit  Physical Exam     I spent 60 minutes with patient today in which greater than 50% of the time was spent in counseling/coordination of care regarding pre-exisitng type 2 diabetes currently pregnancy in the second trimester  VIRTUAL VISIT DISCLAIMER      Raheel Corcoran verbally agrees to participate in [employer]   Pt is aware that [employer] could be limited without vital signs or the ability to perform a full hands-on physical exam  Suzie Bey understands she or the provider may request at any time to terminate the video visit and request the patient to seek care or treatment in person  Thank you for referring your patient to [facility] Maternal Fetal Medicine Diabetes in Pregnancy Program      Jennifer Jay is a  36 y o  female who presents today for Class 1  Patient is at 18w5d gestation, Estimated Date of Delivery: 22  Reviewed and updated the following from patients medical record: PMH, Problem List, Allergies, and Current Medications  Visit Diagnosis:  Pre-existing type 2 DM with hyperglycemia in pregnancy    Discussed with patient pathophysiology of Type 2 DM, untreated hyperglycemia in pregnancy and maternal fetal complications including fetal macrosomia,  hypoglycemia, polyhydramnios, increased incidence of  section,  labor, and in severe cases fetal demise and still birth   Discussed importance of blood glucose monitoring, nutrition, and medication if necessary in achieving BG goals  Additional Pregnancy Complications:  Obesity, Inadequate weight gain in pregnancy and patient reported nausea has improved in the second trimester but her she continues to have a poor appetite and she is also experiencing constipation  Labs:    Lab Results   Component Value Date    [id number] 206 (H) 2022       Lab Results   Component Value Date    GLUF 85 2022 HgA1c 5 9%    Medications:  Levemir 16 units at 8>30PM  Metformin XR 2000 mg with dinner meal     Anthropometrics:  Ht Readings from Last 3 Encounters:   22 5' 7" (1 702 m)   07/15/22 5' 7" (1 702 m)   22 5' 7" (1 702 m)     Wt Readings from Last 3 Encounters:   22 88 5 kg (195 lb)   07/15/22 88 6 kg (195 lb 6 4 oz)   22 88 kg (194 lb)     Pre-gravid weight: 94 3 kg (208 lb)  Pre-gravid BMI: 32 57  Weight Change: -5 715 kg (-12 lb 9 6 oz)  Weight gain recommendations: BMI (> 30) 11-20 lbs  Comments: Per patient improved nausea but lack of appetite continues and problems with constipation    Recent Ultra Sound Results:  Date: 7/15/22  Fetal Growth: Normal  PRESTON: Normal  Next US date: 22 Detailed ultraosund    Blood Glucose Monitoring:   Glucose Meter: OneTouch Verio Flex  Instructed on testing blood sugars: 4 x per day (Fasting, 2 hour after start of each meal)     Gave instruction on site selection, skin preparation, loading strips and lancet device, meter activation, obtaining blood sample, test strip and lancet disposal and storage, and recording log book entries  Patient has good understanding of material covered and was able to test their own blood sugar in office today  Instruction for reporting blood sugar results weekly via:  Phone: [phone]   OR  My Chart (Message with image attachment, or Glucose Flowsheet)    Goal Blood Sugar Ranges:   Fastin-90 mg/dL  1 hour after the start of each meal: 140 mg/dL or less  2 hours after start of each meal: 120 mg/dL or less        Patient reported today's 22 fasting blood sugar was 87 mg/dl  Levemir increased to 16 units on 22 during CRNP appointment due to FBG>90  Patient reported being unable to test 4 times per day consistently due to her job  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  She reported working >40 hrs per week  Suggested setting phone alarms and 1 hr pp is acceptable if this would be easier  Stressed importance of obtaining 4x/d measurements  Hypersensitivity reaction at injection site        -Reports welts at injection site with itching; denies difficulty breathing  Has tried alternative sites with same issue   -Script sent for Lantus pending insurance approval   -Has dermatology appointment today and encouraged to have injection sites evaluated during visit           Patient is still struggling with injection site reaction being redness and welts as well as itching  She is using hydrocortisone cream which is helping slightly  Requested patient send a [facility] message with a photo image of injection site reaction  Patient did not talk with her dermatologist about this at her recent appointment  Adolfo Ramos MA to talk with pharmacy about obtaining Semglee from another pharmacy if needed  ALEXIS Cabello to send another Semglee order to pharmacy  Angela spoke with the pharmacy and Ean Farley is covered by patient's insurance plan  Meal Plan (daily calorie and protein needs):  Calories: 2000 calorie (BARI:[id number]) (PRO: 2/3-1-3/4-1-3/4-2) Patient reported lactose intolerance and will use almond milk  She does not tolerate yogurt but can tolerate cheeses in a smaller amount  Type of Diet:Regular  Additional Nutrition Concerns: Decreased appetite continues possibly affected by constipation  Patient reports nausea has resolved  Patient is not consistently have between meal snacks and is usually not hungry after dinner meal  She is trying to test FBG measurement following at least an 8 hr fast and no longer than 10 hrs overnight  Meal Plan Tips:  1  Patient was provided with a meal plan including 3 meals and 3 snacks  2  Discussed appropriate amounts of CHO, PRO, and Fat at each meal and snack  3  Reviewed CHO exchange list, and portion sizes for both CHO and PRO via food models  4  Instruction on how to read a food label  5  Provided suggested meal/snack options to increase nutrition and maintain consistent meal and snack intakes  6  Instructed on how to keep a 3-day food diary to be brought to follow- up appointment  7  Encouraged  patient to eat every 2 0-3 5 hours while awake  8  Encouraged patient to go no longer than 8-10 hours fasting overnight until first meal of the day  9  Discussed supplements to help with snacks between meals such as Glucerna shakes, CIB light start or sugar free, Baby booster shakes <15 gm sugar per serving  10  Advised to set a phone alarm for reminders to test blood sugars on time  Also suggested 1 hr pp versus 2 hr pp if this is more convenient   Informed patient she will need to inform the diabetes team by [facility] message if she is testing 1 hr versus 2 hr pp  Physical Activity:  Discussed benefits of physical activity to optimize blood glucose control, encouraged activity at patient is physically able  Always consult a physician prior to starting an exercise program  Recommend 20-30 minutes daily  Patient is walking on days off and is very active in her job  Patient Stated Goal: "I will check my blood sugar 4 times each day, as directed by diabetes and pregnancy team"    Diabetes Self Management Support Plan outside of ongoing care: Spouse/Family    Learner/s Present:Learners Present: Patient   Barriers to Learning/Change: No Barriers  Expected Compliance: good    Date to report blood sugars: Requested patient update glucose flow sheet weekly  Sooner if noting blood sugars are consistently above target range  Class 2 (date): 8/4/22    Begin Time: 12:30 PM  End Time: 1:30 PM    It was a pleasure working with them today  Please feel free to call with any questions or concerns      Thee Zavala  Diabetes Educator  [facility] Maternal Fetal Medicine  Diabetes in Pregnancy Program  [address] [place] [address],[address]  [address]

## 2022-08-03 ENCOUNTER — TELEPHONE (OUTPATIENT)
Dept: PERINATAL CARE | Facility: CLINIC | Age: 32
End: 2022-08-03

## 2022-08-03 NOTE — TELEPHONE ENCOUNTER
Called and lvm for pt to call back to reschedule her cancelled ultrasound on 8/2  Told pt to call back at [phone]

## 2022-08-05 DIAGNOSIS — R21 RASH: ICD-10-CM

## 2022-08-05 DIAGNOSIS — Z34.02 ENCOUNTER FOR SUPERVISION OF NORMAL FIRST PREGNANCY IN SECOND TRIMESTER: ICD-10-CM

## 2022-08-05 RX ORDER — LORATADINE 10 MG/1
TABLET ORAL
Qty: 90 TABLET | Refills: 2 | Status: SHIPPED | OUTPATIENT
Start: 2022-08-05

## 2022-08-09 ENCOUNTER — DOCUMENTATION (OUTPATIENT)
Dept: PERINATAL CARE | Facility: CLINIC | Age: 32
End: 2022-08-09

## 2022-08-09 NOTE — PROGRESS NOTES
Date: 08/09/22  Rosalba Bey  1990  Estimated Date of Delivery: 12/24/22  20w3d  OB/GYN: Josiah Warren  Pre-existing type 2 DM with hyperglycemia in pregnancy diagnosed 3 years ago   Additional Pregnancy Complications: Obesity      Plan: Continue Semglee dose of 16 Units at bedtime daily   -Semglee started 7/28/22 due to [address] reaction    Continue Metformin XR 2000 mg with dinner    Diet: : 2000 calorie (CHO:45-15-60-15-60-30) (PRO: 2/3-1-3/4-1-3/4-2)  Gestational diabetes meal plan; 3 meals and 3 snacks  Testing blood sugars: 4 x per day (Fasting, 2 hour after start of each meal)  Meter: OneTouch Verio Flex   Noted Dexcom G6 CGM ordered on 6/16/22  Activity: Walks 30 minutes daily, follow OB recommendations  Support System: Significant Other / family   Patient Goal: "I will eat 3 meals and 3 snacks each day, including protein at each"  Education:  Class 1 completed with Andreina Fowler on 07/28/22  Class 2 will need to be scheduled     Follow up with ALEXIS on 9/15/22  Weight Change:    Pre-gravid weight: 94 3 kg (208 lb)  -5 715 kg (-12 lb 9 6 oz)  Weight gain recommendations: BMI (> 30) 11-20 lbs    Ultrasounds  6/16/22 US for viability, dating and nuchal translucency:  Normal growth and PRESTON     US for early anatomy on 7/15/22  Detailed Us scheduled - 8/11/22  Echo scheduled - 8/23/22     Labs  Lab Results   Component Value Date    [id number] 206 (H) 06/13/2022     Lab Results   Component Value Date    GLUF 85 07/07/2022     Lab Results   Component Value Date    HGBA1C 5 9 (H) 07/07/2022       Further fetal surveillance  Beginning at 32 weeks, NST / PRESTON twice a week      Michele Doan, RN  Diabetes Education

## 2022-08-10 PROBLEM — R21 SKIN RASH: Status: RESOLVED | Noted: 2022-07-14 | Resolved: 2022-08-10

## 2022-08-10 PROBLEM — E11.9 TYPE 2 DIABETES MELLITUS WITHOUT COMPLICATION (HCC): Status: RESOLVED | Noted: 2019-01-08 | Resolved: 2022-08-10

## 2022-08-10 PROBLEM — K59.00 CONSTIPATION: Status: RESOLVED | Noted: 2021-12-14 | Resolved: 2022-08-10

## 2022-08-10 PROBLEM — R03.0 WHITE COAT SYNDROME WITHOUT DIAGNOSIS OF HYPERTENSION: Status: RESOLVED | Noted: 2021-02-09 | Resolved: 2022-08-10

## 2022-08-10 PROBLEM — Z3A.20 20 WEEKS GESTATION OF PREGNANCY: Status: ACTIVE | Noted: 2022-06-16

## 2022-08-10 PROBLEM — U07.1 COVID-19: Status: RESOLVED | Noted: 2021-05-06 | Resolved: 2022-08-10

## 2022-08-11 ENCOUNTER — ROUTINE PRENATAL (OUTPATIENT)
Dept: PERINATAL CARE | Facility: OTHER | Age: 32
End: 2022-08-11
Payer: COMMERCIAL

## 2022-08-11 VITALS
BODY MASS INDEX: 31.08 KG/M2 | SYSTOLIC BLOOD PRESSURE: 117 MMHG | HEART RATE: 118 BPM | HEIGHT: 67 IN | WEIGHT: 198 LBS | DIASTOLIC BLOOD PRESSURE: 80 MMHG

## 2022-08-11 DIAGNOSIS — E11.65 PRE-EXISTING TYPE 2 DIABETES MELLITUS WITH HYPERGLYCEMIA DURING PREGNANCY IN SECOND TRIMESTER (HCC): Primary | ICD-10-CM

## 2022-08-11 DIAGNOSIS — Z36.89 ENCOUNTER FOR FETAL ANATOMIC SURVEY: ICD-10-CM

## 2022-08-11 DIAGNOSIS — Z3A.20 20 WEEKS GESTATION OF PREGNANCY: ICD-10-CM

## 2022-08-11 DIAGNOSIS — O24.112 PRE-EXISTING TYPE 2 DIABETES MELLITUS WITH HYPERGLYCEMIA DURING PREGNANCY IN SECOND TRIMESTER (HCC): Primary | ICD-10-CM

## 2022-08-11 DIAGNOSIS — O24.812: ICD-10-CM

## 2022-08-11 DIAGNOSIS — Z36.86 ENCOUNTER FOR ANTENATAL SCREENING FOR CERVICAL LENGTH: ICD-10-CM

## 2022-08-11 PROBLEM — Z00.00 ANNUAL PHYSICAL EXAM: Status: RESOLVED | Noted: 2022-07-14 | Resolved: 2022-08-11

## 2022-08-11 PROCEDURE — 76817 TRANSVAGINAL US OBSTETRIC: CPT | Performed by: OBSTETRICS & GYNECOLOGY

## 2022-08-11 PROCEDURE — 99213 OFFICE O/P EST LOW 20 MIN: CPT | Performed by: OBSTETRICS & GYNECOLOGY

## 2022-08-11 PROCEDURE — 76811 OB US DETAILED SNGL FETUS: CPT | Performed by: OBSTETRICS & GYNECOLOGY

## 2022-08-11 NOTE — PROGRESS NOTES
[address]: Ms Javan Mcbride was seen today at 20w5d for anatomic survey and cervical length screening ultrasound  See ultrasound report under "OB Procedures" tab    Please don't hesitate to contact our office with any concerns or questions   -Raina Newton MD

## 2022-08-11 NOTE — LETTER
Alysa Eng,    This patient doesn't have any upcoming OB visits scheduled, so I am routing this ultrasound report to you  Normal/limited fetal anatomy, no concerns  Could you get help to get an OB visit scheduled?      Thanks,  Ardie Runner

## 2022-08-11 NOTE — PATIENT INSTRUCTIONS
Please refer to " Ultrasound" under test results in MyChart for today's ultrasound findings  Congratulations, and best wishes for a healthy remainder of the pregnancy! Thank you for choosing [facility] for your visit today  We appreciate your trust and the opportunity to assist your obstetrician with your care  We value your feedback regarding the care we are providing  Following today's appointment, you may receive a patient satisfaction survey by mail or e-mail requesting feedback on your visit  We ask that you complete the survey to  help us understand how we are doing  Thank you for in advance for your feedback

## 2022-08-11 NOTE — PROGRESS NOTES
Ultrasound Probe Disinfection    A transvaginal ultrasound was performed  Prior to use, disinfection was performed with High Level [employer] ([employer])  Probe serial number M1: [id number] was used        Sima Fajardo  08/11/22  8:07 AM

## 2022-08-23 ENCOUNTER — ROUTINE PRENATAL (OUTPATIENT)
Dept: PEDIATRIC CARDIOLOGY | Facility: CLINIC | Age: 32
End: 2022-08-23
Payer: COMMERCIAL

## 2022-08-23 VITALS
DIASTOLIC BLOOD PRESSURE: 78 MMHG | HEIGHT: 67 IN | HEART RATE: 101 BPM | SYSTOLIC BLOOD PRESSURE: 130 MMHG | BODY MASS INDEX: 31.38 KG/M2 | WEIGHT: 199.96 LBS

## 2022-08-23 DIAGNOSIS — Z36.89 NORMAL FETAL CARDIAC EXAM: Primary | ICD-10-CM

## 2022-08-23 PROCEDURE — 99205 OFFICE O/P NEW HI 60 MIN: CPT | Performed by: PEDIATRICS

## 2022-08-23 PROCEDURE — 76827 ECHO EXAM OF FETAL HEART: CPT | Performed by: PEDIATRICS

## 2022-08-23 PROCEDURE — 76825 ECHO EXAM OF FETAL HEART: CPT | Performed by: PEDIATRICS

## 2022-08-23 PROCEDURE — 93325 DOPPLER ECHO COLOR FLOW MAPG: CPT | Performed by: PEDIATRICS

## 2022-08-23 PROCEDURE — 76820 UMBILICAL ARTERY ECHO: CPT | Performed by: PEDIATRICS

## 2022-08-23 NOTE — PROGRESS NOTES
Fetal Cardiology Consult    Date of Visit:    2022  Gestational Age:  22w3d   Estimated Date of Delivery: 22     Dear Dr Doree Goltz     I had the opportunity to meet with Cody today for a Fetal Cardiology Consult and Fetal Echocardiogram  The reason for consultation was diabetes  The Fetal Echocardiogram demonstrated normal cardiac anatomy and function (see full report under OB procedures)  I reviewed the normal findings  We also discussed, that due to the technical limitations of fetal echocardiography and the nature of fetal circulation, a number of cardiovascular defects cannot be definitively ruled out at this stage  Examples include but are not limited to: ASD, PDA, small VSD, coarctation of the aorta, partial anomalous pulmonary venous connection  Assessment and Recommendations:  -Normal fetal cardiac anatomy and function    -Normal  care and prenatal care are recommended     -There is no follow-up needed  Thank you for allowing me to participate in [facility] care  Feel free to contact me with questions  I spent 60minutes - on the day of service - reviewing the patient's chart, reviewing previous imaging studies, counseling the patient about the fetal findings, coordinating care, and documenting care  Braulio Valdes MD  Pediatric Cardiology  [address] [place] [address]  Fax: [phone]  [email or web address]

## 2022-08-29 ENCOUNTER — ROUTINE PRENATAL (OUTPATIENT)
Dept: OBGYN CLINIC | Facility: CLINIC | Age: 32
End: 2022-08-29

## 2022-08-29 VITALS — WEIGHT: 200.6 LBS | SYSTOLIC BLOOD PRESSURE: 120 MMHG | DIASTOLIC BLOOD PRESSURE: 78 MMHG | BODY MASS INDEX: 31.42 KG/M2

## 2022-08-29 DIAGNOSIS — O24.112 PRE-EXISTING TYPE 2 DIABETES MELLITUS WITH HYPERGLYCEMIA DURING PREGNANCY IN SECOND TRIMESTER (HCC): Primary | ICD-10-CM

## 2022-08-29 DIAGNOSIS — Z34.02 ENCOUNTER FOR SUPERVISION OF NORMAL FIRST PREGNANCY IN SECOND TRIMESTER: ICD-10-CM

## 2022-08-29 DIAGNOSIS — Z3A.23 23 WEEKS GESTATION OF PREGNANCY: ICD-10-CM

## 2022-08-29 DIAGNOSIS — E11.65 PRE-EXISTING TYPE 2 DIABETES MELLITUS WITH HYPERGLYCEMIA DURING PREGNANCY IN SECOND TRIMESTER (HCC): Primary | ICD-10-CM

## 2022-08-29 LAB
SL AMB  POCT GLUCOSE, UA: NORMAL
SL AMB POCT URINE PROTEIN: NORMAL

## 2022-08-29 PROCEDURE — PNV: Performed by: OBSTETRICS & GYNECOLOGY

## 2022-08-29 NOTE — ASSESSMENT & PLAN NOTE
Yuan Hair is overall doing well  Baby girl! She is active  No bleeding/LOF/contractions  Some occasional right leg pain/numbness  28wk lab slip given  Rh positive  Discussed TDAP in third trimester  Does plan to breastfeed

## 2022-08-29 NOTE — PROGRESS NOTES
Problem List Items Addressed This Visit        Endocrine    Pre-existing type 2 diabetes mellitus with hyperglycemia during pregnancy in second trimester [facility]) - Primary       Other    23 weeks gestation of pregnancy     [place] is overall doing well  Baby girl! She is active  No bleeding/LOF/contractions  Some occasional right leg pain/numbness  28wk lab slip given  Rh positive  Discussed TDAP in third trimester  Does plan to breastfeed              Other Visit Diagnoses     Encounter for supervision of normal first pregnancy in second trimester        Relevant Orders    POCT urine dip (Completed)    CBC and differential    RPR    Anemia Panel w/Reflex, OB

## 2022-09-15 ENCOUNTER — TELEMEDICINE (OUTPATIENT)
Dept: PERINATAL CARE | Facility: CLINIC | Age: 32
End: 2022-09-15
Payer: COMMERCIAL

## 2022-09-15 VITALS — BODY MASS INDEX: 31.39 KG/M2 | HEIGHT: 67 IN | WEIGHT: 200 LBS

## 2022-09-15 DIAGNOSIS — Z3A.25 25 WEEKS GESTATION OF PREGNANCY: ICD-10-CM

## 2022-09-15 DIAGNOSIS — O99.212 OBESITY AFFECTING PREGNANCY IN SECOND TRIMESTER: ICD-10-CM

## 2022-09-15 DIAGNOSIS — E11.65 PRE-EXISTING TYPE 2 DIABETES MELLITUS WITH HYPERGLYCEMIA DURING PREGNANCY IN SECOND TRIMESTER (HCC): Primary | ICD-10-CM

## 2022-09-15 DIAGNOSIS — E11.9 TYPE 2 DIABETES MELLITUS WITHOUT COMPLICATION, WITHOUT LONG-TERM CURRENT USE OF INSULIN (HCC): ICD-10-CM

## 2022-09-15 DIAGNOSIS — O24.112 PRE-EXISTING TYPE 2 DIABETES MELLITUS WITH HYPERGLYCEMIA DURING PREGNANCY IN SECOND TRIMESTER (HCC): Primary | ICD-10-CM

## 2022-09-15 PROCEDURE — 99213 OFFICE O/P EST LOW 20 MIN: CPT | Performed by: NURSE PRACTITIONER

## 2022-09-15 RX ORDER — INSULIN GLARGINE-YFGN 100 [IU]/ML
22 INJECTION, SOLUTION SUBCUTANEOUS
Qty: 21 ML | Refills: 0 | Status: SHIPPED | OUTPATIENT
Start: 2022-09-15

## 2022-09-15 RX ORDER — PEN NEEDLE, DIABETIC 32GX 5/32"
NEEDLE, DISPOSABLE MISCELLANEOUS
Qty: 150 EACH | Refills: 1 | Status: SHIPPED | OUTPATIENT
Start: 2022-09-15 | End: 2022-10-19 | Stop reason: SDUPTHER

## 2022-09-15 NOTE — PROGRESS NOTES
Virtual Regular Visit    Verification of patient location:[place]    Patient is located in the following state in which I hold an active license [place]      Assessment/Plan:    Problem List Items Addressed This Visit        Endocrine    Pre-existing type 2 diabetes mellitus with hyperglycemia during pregnancy in second trimester ([facility] [address] ) - Primary     -A1c 5 9% at goal   -Repeat CMP, A1c and urine protein creatinine ratio with 28 week prenatal labs  -Due to FBS>90; increase Semglee to 22 units daily at 9 PM   -Continue GDM diet and avoid fasting greater than 8 to 10 hours overnight   -Increase SMBG for a total of 4 readings a day; fasting and 2 hours post start of each meal  Continue reporting via flowsheet   -Glucose goals fasting 60-90; 1 hour post meal 140 or less; 2 hours post meal 120 or less; before meals/overnight    -If 2 hours post meal readings are above 120; mealtime insulin will be added   -Continue follow up with OB and MFM as recommended   -Fetal growth ultrasound every 4 to 6 weeks as recommended   -Fetal echo completed  -Starting at 32 weeks gestation, NST twice a week and PRESTON weekly  -Stop baby aspirin at 36 weeks gestation    -Continue close contact with diabetes team   -Follow up with endocrinology post delivery     Lab Results   Component Value Date    HGBA1C 5 9 (H) 07/07/2022            Relevant Medications    Insulin Glargine-yfgn (Semglee, yfgn,) 100 UNIT/ML SOPN    Insulin Pen Needle (BD Pen Needle Ngoc U/F) 32G X 4 MM MISC    Other Relevant Orders    Hemoglobin A1C    Comprehensive metabolic panel    Protein / creatinine ratio, urine       Other    BMI 31 0-31 9,adult    Relevant Medications    Insulin Glargine-yfgn (Semglee, yfgn,) 100 UNIT/ML SOPN    Insulin Pen Needle (BD Pen Needle Ngoc U/F) 32G X 4 MM MISC    Other Relevant Orders    Hemoglobin A1C    Comprehensive metabolic panel    Protein / creatinine ratio, urine    Obesity affecting pregnancy in second trimester     -First visit weight 208 lbs  -Current weight 200 lbs  -TWL -8 lbs   -Continue with GDM diet  Relevant Medications    Insulin Glargine-yfgn (Semglee, yfgn,) 100 UNIT/ML SOPN    Insulin Pen Needle (BD Pen Needle Ngoc U/F) 32G X 4 MM MISC    Other Relevant Orders    Hemoglobin A1C    Comprehensive metabolic panel    Protein / creatinine ratio, urine    25 weeks gestation of pregnancy    Relevant Medications    Insulin Glargine-yfgn (Semglee, yfgn,) 100 UNIT/ML SOPN    Insulin Pen Needle (BD Pen Needle Ngoc U/F) 32G X 4 MM MISC    Other Relevant Orders    Hemoglobin A1C    Comprehensive metabolic panel    Protein / creatinine ratio, urine      Other Visit Diagnoses     Type 2 diabetes mellitus without complication, without long-term current use of insulin (HCC)        Relevant Medications    Insulin Glargine-yfgn (Semglee, yfgn,) 100 UNIT/ML SOPN               Reason for visit is   Chief Complaint   Patient presents with    Virtual Regular Visit    Diabetes Type 2        Encounter provider Thea Pitts, [facility]    Provider located at [address] [place] [id number] [phone]      Recent Visits  No visits were found meeting these conditions  Showing recent visits within past 7 days and meeting all other requirements  Today's Visits  Date Type Provider Dept   09/15/22 [id number] [facility], [facility] today's visits and meeting all other requirements  Future Appointments  No visits were found meeting these conditions  Showing future appointments within next 150 days and meeting all other requirements       The patient was identified by name and date of birth  Sherice Bailey was informed that this is a telemedicine visit and that the visit is being conducted through Telephone  My office door was closed  No one else was in the room  She acknowledged consent and understanding of privacy and security of the video platform   The patient has agreed to participate and understands they can discontinue the visit at any time  Unable to connect virtually and patient agreed to complete visit vis phone  Patient is aware this is a billable service  Judah Trujillo is a 36 y o  female  22 5/7 weeks gestation T2DM on Semglee 16 units daily and Metformin XR 2000 mg with dinner  Eating 3 meals and 3 snacks a day  SMBG fasting and 2 hours post start of meals; inconsistent with testing 4 times a day; encouraged to increase testing in order to assess if mealtime insulin is needed  Positive fetal movement  HPI     Past Medical History:   Diagnosis Date    16 weeks gestation of pregnancy 2022    Annual physical exam 2022    Asthma     BMI 29 0-29 9,adult 2021    BMI 30 0-30 9,adult 2021    BMI 31 0-31 9,adult 2021    BMI 33 0-33 9,adult 2021    Constipation 2021    COVID-19 2021    Diabetes mellitus ([address] )     DM2 (diabetes mellitus, type 2) (HCC)     Elevated blood pressure reading in office without diagnosis of hypertension 3/31/2022    Elevated cortisol level     Hand pain, right     High triglycerides     Hypertriglyceridemia 2021    Microalbuminuria 2021    Mild intermittent asthma without complication 2607    Obesity     Rhinitis, allergic 2021    Sinobronchitis     Skin rash 2022    Type 2 diabetes mellitus (HCC)        Past Surgical History:   Procedure Laterality Date    CHOLECYSTECTOMY      WISDOM TOOTH EXTRACTION         Current Outpatient Medications   Medication Sig Dispense Refill    Insulin Glargine-yfgn (Semglee, yfgn,) 100 UNIT/ML SOPN Inject 0 22 mL (22 Units total) under the skin daily at bedtime To be titrated  T2DM and pregnancy  90 day  21 mL 0    Insulin Pen Needle (BD Pen Needle Ngoc U/F) 32G X 4 MM MISC Use up to 5 a day as recommended   150 each 1    Blood Glucose Monitoring Suppl (OneTouch Verio Flex System) w/Device KIT Dispense 1 kit per insurance formulary  1 kit 0    cholecalciferol (VITAMIN D3) 400 units tablet Take 400 Units by mouth daily      Cranberry 125 MG TABS Take by mouth      loratadine (CLARITIN) 10 mg tablet TAKE 1 TABLET BY MOUTH EVERY DAY 90 tablet 2    metFORMIN (GLUCOPHAGE-XR) 500 mg 24 hr tablet Take 4 tablets (2,000 mg total) by mouth daily with dinner 360 tablet 1    Omega-3 Fatty Acids (fish oil) 1,000 mg Take 2 capsules by mouth see administration instructions 2 cap  qpm      OneTouch Delica Lancets 86S MISC Use 6 a day or as directed  T2DM  150 each 6    OneTouch Verio test strip Test 6 times a day and as instructed  T2DM and pregnancy  150 each 6    Prenatal Vit-Fe Fumarate-FA (PRENATAL PO) Take by mouth       No current facility-administered medications for this visit  Allergies   Allergen Reactions    Latex Rash       Review of Systems   Constitutional: Negative for fatigue and fever  Eyes: Negative for visual disturbance  Respiratory: Negative for cough and shortness of breath  Cardiovascular: Negative for chest pain and palpitations  Gastrointestinal: Negative for constipation, diarrhea, nausea and vomiting  Endocrine: Positive for polyphagia and polyuria  Negative for polydipsia  Genitourinary: Negative for difficulty urinating and vaginal bleeding  Neurological: Negative for headaches  Psychiatric/Behavioral: Negative for sleep disturbance  Video Exam  Refer to glucose flowsheet until 9/15/2022  Vitals:    09/15/22 1011   Weight: 90 7 kg (200 lb)   Height: 5' 7" (1 702 m)       Physical Exam   It was my intent to perform this visit via video technology but the patient was not able to do a video connection so the visit was completed via audio telephone only  I spent 28 minutes directly with the patient during this visit

## 2022-09-15 NOTE — PATIENT INSTRUCTIONS
-A1c 5 9% at goal   -Repeat CMP, A1c and urine protein creatinine ratio with 28 week prenatal labs  -Due to FBS>90; increase Semglee to 22 units daily at 9 PM   -Continue GDM diet and avoid fasting greater than 8 to 10 hours overnight   -Increase SMBG for a total of 4 readings a day; fasting and 2 hours post start of each meal  Continue reporting via flowsheet   -Glucose goals fasting 60-90; 1 hour post meal 140 or less; 2 hours post meal 120 or less; before meals/overnight    -If 2 hours post meal readings are above 120; mealtime insulin will be added   -Continue follow up with OB and MFM as recommended   -Fetal growth ultrasound every 4 to 6 weeks as recommended   -Fetal echo completed  -Starting at 32 weeks gestation, NST twice a week and PRESTON weekly  -Stop baby aspirin at 36 weeks gestation    -Continue close contact with diabetes team   -Follow up with endocrinology post delivery

## 2022-09-15 NOTE — ASSESSMENT & PLAN NOTE
-A1c 5 9% at goal   -Repeat CMP, A1c and urine protein creatinine ratio with 28 week prenatal labs  -Due to FBS>90; increase Semglee to 22 units daily at 9 PM   -Continue GDM diet and avoid fasting greater than 8 to 10 hours overnight   -Increase SMBG for a total of 4 readings a day; fasting and 2 hours post start of each meal  Continue reporting via flowsheet   -Glucose goals fasting 60-90; 1 hour post meal 140 or less; 2 hours post meal 120 or less; before meals/overnight    -If 2 hours post meal readings are above 120; mealtime insulin will be added   -Continue follow up with OB and MFM as recommended   -Fetal growth ultrasound every 4 to 6 weeks as recommended   -Fetal echo completed  -Starting at 32 weeks gestation, NST twice a week and PRESTON weekly  -Stop baby aspirin at 36 weeks gestation    -Continue close contact with diabetes team   -Follow up with endocrinology post delivery     Lab Results   Component Value Date    HGBA1C 5 9 (H) 07/07/2022

## 2022-09-20 ENCOUNTER — APPOINTMENT (OUTPATIENT)
Dept: LAB | Facility: MEDICAL CENTER | Age: 32
End: 2022-09-20
Payer: COMMERCIAL

## 2022-09-20 DIAGNOSIS — E11.65 PRE-EXISTING TYPE 2 DIABETES MELLITUS WITH HYPERGLYCEMIA DURING PREGNANCY IN SECOND TRIMESTER (HCC): ICD-10-CM

## 2022-09-20 DIAGNOSIS — O99.212 OBESITY AFFECTING PREGNANCY IN SECOND TRIMESTER: ICD-10-CM

## 2022-09-20 DIAGNOSIS — Z34.02 ENCOUNTER FOR SUPERVISION OF NORMAL FIRST PREGNANCY IN SECOND TRIMESTER: ICD-10-CM

## 2022-09-20 DIAGNOSIS — O24.112 PRE-EXISTING TYPE 2 DIABETES MELLITUS WITH HYPERGLYCEMIA DURING PREGNANCY IN SECOND TRIMESTER (HCC): ICD-10-CM

## 2022-09-20 DIAGNOSIS — E78.1 HYPERTRIGLYCERIDEMIA: ICD-10-CM

## 2022-09-20 DIAGNOSIS — Z3A.25 25 WEEKS GESTATION OF PREGNANCY: ICD-10-CM

## 2022-09-20 LAB
ALBUMIN SERPL BCP-MCNC: 2.6 G/DL (ref 3.5–5)
ALP SERPL-CCNC: 82 U/L (ref 46–116)
ALT SERPL W P-5'-P-CCNC: 18 U/L (ref 12–78)
ANION GAP SERPL CALCULATED.3IONS-SCNC: 8 MMOL/L (ref 4–13)
AST SERPL W P-5'-P-CCNC: 14 U/L (ref 5–45)
BASOPHILS # BLD AUTO: 0.01 THOUSANDS/ΜL (ref 0–0.1)
BASOPHILS NFR BLD AUTO: 0 % (ref 0–1)
BILIRUB SERPL-MCNC: 0.57 MG/DL (ref 0.2–1)
BUN SERPL-MCNC: 4 MG/DL (ref 5–25)
CALCIUM ALBUM COR SERPL-MCNC: 10.1 MG/DL (ref 8.3–10.1)
CALCIUM SERPL-MCNC: 9 MG/DL (ref 8.3–10.1)
CHLORIDE SERPL-SCNC: 107 MMOL/L (ref 96–108)
CO2 SERPL-SCNC: 24 MMOL/L (ref 21–32)
CREAT SERPL-MCNC: 0.49 MG/DL (ref 0.6–1.3)
CREAT UR-MCNC: 67.4 MG/DL
EOSINOPHIL # BLD AUTO: 0.04 THOUSAND/ΜL (ref 0–0.61)
EOSINOPHIL NFR BLD AUTO: 0 % (ref 0–6)
ERYTHROCYTE [DISTWIDTH] IN BLOOD BY AUTOMATED COUNT: 12.7 % (ref 11.6–15.1)
EST. AVERAGE GLUCOSE BLD GHB EST-MCNC: 108 MG/DL
GFR SERPL CREATININE-BSD FRML MDRD: 132 ML/MIN/1.73SQ M
GLUCOSE P FAST SERPL-MCNC: 86 MG/DL (ref 65–99)
HBA1C MFR BLD: 5.4 %
HCT VFR BLD AUTO: 37.4 % (ref 34.8–46.1)
HGB BLD-MCNC: 12.4 G/DL (ref 11.5–15.4)
IMM GRANULOCYTES # BLD AUTO: 0.06 THOUSAND/UL (ref 0–0.2)
IMM GRANULOCYTES NFR BLD AUTO: 1 % (ref 0–2)
LYMPHOCYTES # BLD AUTO: 1.98 THOUSANDS/ΜL (ref 0.6–4.47)
LYMPHOCYTES NFR BLD AUTO: 18 % (ref 14–44)
MCH RBC QN AUTO: 30.9 PG (ref 26.8–34.3)
MCHC RBC AUTO-ENTMCNC: 33.2 G/DL (ref 31.4–37.4)
MCV RBC AUTO: 93 FL (ref 82–98)
MONOCYTES # BLD AUTO: 0.52 THOUSAND/ΜL (ref 0.17–1.22)
MONOCYTES NFR BLD AUTO: 5 % (ref 4–12)
NEUTROPHILS # BLD AUTO: 8.23 THOUSANDS/ΜL (ref 1.85–7.62)
NEUTS SEG NFR BLD AUTO: 76 % (ref 43–75)
NRBC BLD AUTO-RTO: 0 /100 WBCS
PLATELET # BLD AUTO: 292 THOUSANDS/UL (ref 149–390)
PMV BLD AUTO: 9.9 FL (ref 8.9–12.7)
POTASSIUM SERPL-SCNC: 3.5 MMOL/L (ref 3.5–5.3)
PROT SERPL-MCNC: 6.5 G/DL (ref 6.4–8.4)
PROT UR-MCNC: 9 MG/DL
PROT/CREAT UR: 0.13 MG/G{CREAT} (ref 0–0.1)
RBC # BLD AUTO: 4.01 MILLION/UL (ref 3.81–5.12)
RPR SER QL: NORMAL
SODIUM SERPL-SCNC: 139 MMOL/L (ref 135–147)
WBC # BLD AUTO: 10.84 THOUSAND/UL (ref 4.31–10.16)

## 2022-09-20 PROCEDURE — 84156 ASSAY OF PROTEIN URINE: CPT | Performed by: NURSE PRACTITIONER

## 2022-09-20 PROCEDURE — 86592 SYPHILIS TEST NON-TREP QUAL: CPT

## 2022-09-20 PROCEDURE — 3061F NEG MICROALBUMINURIA REV: CPT | Performed by: NURSE PRACTITIONER

## 2022-09-20 PROCEDURE — 80053 COMPREHEN METABOLIC PANEL: CPT

## 2022-09-20 PROCEDURE — 85025 COMPLETE CBC W/AUTO DIFF WBC: CPT

## 2022-09-20 PROCEDURE — 83036 HEMOGLOBIN GLYCOSYLATED A1C: CPT | Performed by: NURSE PRACTITIONER

## 2022-09-20 PROCEDURE — 82570 ASSAY OF URINE CREATININE: CPT | Performed by: NURSE PRACTITIONER

## 2022-09-20 PROCEDURE — 36415 COLL VENOUS BLD VENIPUNCTURE: CPT | Performed by: NURSE PRACTITIONER

## 2022-09-20 PROCEDURE — 3044F HG A1C LEVEL LT 7.0%: CPT | Performed by: NURSE PRACTITIONER

## 2022-09-21 PROBLEM — Z3A.26 26 WEEKS GESTATION OF PREGNANCY: Status: ACTIVE | Noted: 2022-06-16

## 2022-09-21 NOTE — PROGRESS NOTES
26w4d  Pap 2022 negative  GC/CT: negative/negative  PN1 Labs: 2022 early glucose abnormal @ 206 (Gestational diabetes insulin depended )  Blood Type: A Positive :   MFM Level 1: 2022  MFM Level 2: 7/15/2022  AFP: 2022  28 Week Labs:2022  TDap:  Flu:  GBS:   Blue Folder: Given   Yellow Folder:  Ped Referral :  Breast pump: Placed breast pump order  at today's visit   L&D forms:  Delivery consent:     Patient reports positive fetal movements with no lost of fluids and no contraction at this time  Patient is planning to breast feed the baby

## 2022-09-21 NOTE — ASSESSMENT & PLAN NOTE
Tom Wilkins is a 36 y o   26w5d  TWG -2 268 kg (-5 lb)  Feels well  Denies LOF/CTX/VB  No concerns  AFP completed  Vaccines discussed    labor precautions reviewed  Encouraged adequate hydration and nutrition  Pregnancy Essential guide and [facility] web site recommended

## 2022-09-21 NOTE — PATIENT INSTRUCTIONS
Pregnancy at 32 to 30 Weeks   AMBULATORY CARE:   What changes are happening to your body: You may notice new symptoms such as shortness of breath, heartburn, or swelling of your ankles and feet  You may also have trouble sleeping or contractions  Seek care immediately if:   You develop a severe headache that does not go away  You have new or increased vision changes, such as blurred or spotted vision  You have new or increased swelling in your face or hands  You have vaginal spotting or bleeding  Your water broke or you feel warm water gushing or trickling from your vagina  Call your doctor or obstetrician if:   You have more than 5 contractions in 1 hour  You notice any changes in your baby's movements  You have abdominal cramps, pressure, or tightening  You have a change in vaginal discharge  You have chills or a fever  You have vaginal itching, burning, or pain  You have yellow, green, white, or foul-smelling vaginal discharge  You have pain or burning when you urinate, less urine than usual, or pink or bloody urine  You have questions or concerns about your condition or care  How to care for yourself at this stage of your pregnancy:       Eat a variety of healthy foods  Healthy foods include fruits, vegetables, whole-grain breads, low-fat dairy foods, beans, lean meats, and fish  Drink liquids as directed  Ask how much liquid to drink each day and which liquids are best for you  Limit caffeine to less than 200 milligrams each day  Limit your intake of fish to 2 servings each week  Choose fish low in mercury such as canned light tuna, shrimp, salmon, cod, or tilapia  Do not  eat fish high in mercury such as swordfish, tilefish, pramod mackerel, and shark  Manage heartburn  by eating 4 or 5 small meals each day instead of large meals  Avoid spicy food  Manage swelling  by lying down and putting your feet up  Take prenatal vitamins as directed  Your need for certain vitamins and minerals, such as folic acid, increases during pregnancy  Prenatal vitamins provide some of the extra vitamins and minerals you need  Prenatal vitamins may also help to decrease the risk of certain birth defects  Talk to your healthcare provider about exercise  Moderate exercise can help you stay fit  Your healthcare provider will help you plan an exercise program that is safe for you during pregnancy  Do not smoke  Smoking increases your risk of a miscarriage and other health problems during your pregnancy  Smoking can cause your baby to be born too early or weigh less at birth  Ask your healthcare provider for information if you need help quitting  Do not drink alcohol  Alcohol passes from your body to your baby through the placenta  It can affect your baby's brain development and cause fetal alcohol syndrome (FAS)  FAS is a group of conditions that causes mental, behavior, and growth problems  Talk to your healthcare provider before you take any medicines  Many medicines may harm your baby if you take them when you are pregnant  Do not take any medicines, vitamins, herbs, or supplements without first talking to your healthcare provider  Never use illegal or street drugs (such as marijuana or cocaine) while you are pregnant  Safety tips during pregnancy:   Avoid hot tubs and saunas  Do not use a hot tub or sauna while you are pregnant, especially during your first trimester  Hot tubs and saunas may raise your baby's temperature and increase the risk of birth defects  Avoid toxoplasmosis  This is an infection caused by eating raw meat or being around infected cat feces  It can cause birth defects, miscarriages, and other problems  Wash your hands after you touch raw meat  Make sure any meat is well-cooked before you eat it  Avoid raw eggs and unpasteurized milk   Use gloves or ask someone else to clean your cat's litter box while you are pregnant  Changes that are happening with your baby:  By 30 weeks, your baby may weigh more than 3 pounds  Your baby may be about 11 inches long from the top of the head to the rump (baby's bottom)  Your baby's eyes open and close now  Your baby's kicks and movements are more forceful at this time  What you need to know about prenatal care: Your healthcare provider will check your blood pressure and weight  You may also need the following:  Blood tests  may be done to check for anemia or blood type  A urine test  may also be done to check for sugar and protein  These can be signs of gestational diabetes or infection  Protein in your urine may also be a sign of preeclampsia  Preeclampsia is a condition that can develop during week 20 or later of your pregnancy  It causes high blood pressure, and it can cause problems with your kidneys and other organs  A Tdap vaccine and flu vaccine  may be recommended by your healthcare provider  A gestational diabetes screen  may be done  Your healthcare provider may order either a 1-step or 2-step oral glucose tolerance test (OGTT)  1-step OGTT:  Your blood sugar level will be tested after you have not eaten for 8 hours (fasting)  You will then be given a glucose drink  Your level will be tested again 1 hour and 2 hours after you finish the drink  2-step OGTT:  You do not have to fast for the first part of the test  You will have the glucose drink at any time of day  Your blood sugar level will be checked 1 hour later  If your blood sugar is higher than a certain level, another test will be ordered  You will fast and your blood sugar level will be tested  You will have the glucose drink  Your blood will be tested again 1 hour, 2 hours, and 3 hours after you finish the glucose drink  Fundal height  is a measurement of your uterus to check your baby's growth  This number is usually the same as the number of weeks that you have been pregnant   Your healthcare provider may also check your baby's position  Your baby's heart rate  will be checked  Follow up with your doctor or obstetrician as directed:  Write down your questions so you remember to ask them during your visits  © Copyright [employer] 2022 Information is for End User's use only and may not be sold, redistributed or otherwise used for commercial purposes  All illustrations and images included in CareNotes® are the copyrighted property of A D A M , Inc  or [employer]   The above information is an  only  It is not intended as medical advice for individual conditions or treatments  Talk to your doctor, nurse or pharmacist before following any medical regimen to see if it is safe and effective for you  Round Ligament Pain   WHAT YOU NEED TO KNOW:   What is round ligament pain? Round ligament pain is caused when ligaments are stretched as your uterus (womb) gets bigger during pregnancy  Round ligaments are found on each side of your uterus  They are bands of tissue that hold the uterus in place  Round ligament pain happens most often during the second trimester  It is a normal part of pregnancy and should stop by the third trimester  The pain is not serious and will not hurt your baby  What are the signs and symptoms of round ligament pain? Pain on one or both sides of your lower abdomen or groin that may move up to your hip    Spasms in the muscles in your abdomen    Pain that lasts a few seconds    Pain that happens when you exercise, sneeze, change positions, or stand quickly    How is round ligament pain diagnosed? Your healthcare provider will examine you and ask about your pain  Tell the provider when the pain started, and if you feel it on one or both sides  Your provider may ask if anything helps the pain or makes it worse  What can I do to manage my pain? Round ligament pain does not need to be treated   The following may help make you more comfortable:  Rest as often as you can  Rest can help relieve round ligament pain  You might want to lie on the side that has pain  Place a pillow under your abdomen  Keep another pillow between your knees  Move slowly  Sudden movement can stretch the ligaments and cause pain  Stand, sit, and change positions slowly  Try to tighten the muscles in your hips before you sneeze or laugh  You can also sit down and bring your knees up toward your abdomen  This can help relieve tension on the ligaments  Exercise as directed  Gentle exercise can keep the ligaments loose and strengthen core (abdominal) muscles  An example is swimming, or a yoga program designed for pregnancy  Ask your healthcare provider which exercises are safe for you and how often to exercise  For most healthy women, a good goal is to try to get at least 30 minutes of exercise every day  If activity causes pain, try not to walk too long or too far at one time  Break your exercise up into short amounts  Apply a warm compress to the area  Warmth can relieve pain and muscle spasms  Ask your healthcare provider if you can take a warm bath or use a heating pad  Keep all heat settings low  High heat can be dangerous for your baby  Do not sit in a hot tub or use hot water in your bath  You may also be able to massage the area gently while you are applying heat  Massage can help relieve pain  Ask about pain medicines  Ask your healthcare provider before you take any medicine during pregnancy, including over-the-counter pain medicines  Your healthcare provider may recommend acetaminophen to relieve the pain  Ask how much to take and how often to take it  Follow directions  Acetaminophen can cause liver damage  Too much medicine can be harmful to your baby  When should I contact my healthcare provider? You have pain that is spreading to other parts of your body  You have new or worsening pain      You have pain that lasts longer than a few minutes at a time  You have questions or concerns about your condition or care  CARE AGREEMENT:   You have the right to help plan your care  Learn about your health condition and how it may be treated  Discuss treatment options with your healthcare providers to decide what care you want to receive  You always have the right to refuse treatment  The above information is an  only  It is not intended as medical advice for individual conditions or treatments  Talk to your doctor, nurse or pharmacist before following any medical regimen to see if it is safe and effective for you  © Copyright [employer] 2022 Information is for End User's use only and may not be sold, redistributed or otherwise used for commercial purposes  All illustrations and images included in CareNotes® are the copyrighted property of [employer]  or [address] [place], [place] [address] in Pregnancy   AMBULATORY CARE:   Kick counts  measure how much your baby is moving in your womb  A kick from your baby can be felt as a twist, turn, swish, roll, or jab  It is common to feel your baby kicking at 26 to 28 weeks of pregnancy  You may feel your baby kick as early as 20 weeks of pregnancy  You may want to start counting at 28 weeks  Contact your doctor immediately if:   You feel a change in the number of kicks or movements of your baby  You feel fewer than 10 kicks within 2 hours  You have questions or concerns about your baby's movements  Why measure kick counts:  Your baby's movement may provide information about your baby's health  He or she may move less, or not at all, if there are problems  Your baby may move less if he or she is not getting enough oxygen or nutrition from the placenta  Do not smoke while you are pregnant  Smoking decreases the amount of oxygen that gets to your baby  Talk to your healthcare provider if you need help to quit smoking   Tell your healthcare provider as soon as you feel a change in your baby's movements  When to measure kick counts:   Measure kick counts at the same time every day  Measure kick counts when your baby is awake and most active  Your baby may be most active in the evening  How to measure kick counts:  Check that your baby is awake before you measure kick counts  You can wake up your baby by lightly pushing on your belly, walking, or drinking something cold  Your healthcare provider may tell you different ways to measure kick counts  You may be told to do the following:  Use a chart or clock to keep track of the time you start and finish counting  Sit in a chair or lie on your left side  Place your hands on the largest part of your belly  Count until you reach 10 kicks  Write down how much time it takes to count 10 kicks  It may take 30 minutes to 2 hours to count 10 kicks  It should not take more than 2 hours to count 10 kicks  Follow up with your doctor as directed:  Write down your questions so you remember to ask them during your visits  © Copyright [employer] 2022 Information is for End User's use only and may not be sold, redistributed or otherwise used for commercial purposes  All illustrations and images included in CareNotes® are the copyrighted property of A D A M , Inc  or [facility] [employer]   The above information is an  only  It is not intended as medical advice for individual conditions or treatments  Talk to your doctor, nurse or pharmacist before following any medical regimen to see if it is safe and effective for you  Early Labor Signs   AMBULATORY CARE:   Early labor signs and symptoms  are the changes in your body that signal your baby is getting ready to be delivered  Early labor signs can happen weeks, days or hours before delivery  Call 911 for any of the following: You have heavy vaginal bleeding  You cannot get to the hospital before the baby starts to come out      Seek care immediately if:   You have regular, painful contractions that are less than 5 minutes apart and last 30 to 70 seconds each  You have a constant trickle or sudden gush of clear fluid from your vagina  You notice a sudden decrease in your baby's movement  Contact your obstetrician or healthcare provider if:   You have pain in your lower back or abdomen that does not get better when you change positions  You have bloody mucus or show  You have questions or concerns about your condition or care  Early labor signs and symptoms:   Lightening  occurs when your baby drops inside your pelvis  You may feel increased pressure in your pelvis  This may happen a few weeks to a few hours before your labor begins  Contractions  are cramps and tightening that occur in your uterus to help move the baby through your birth canal  Contractions occur regularly and more often each time  Each one lasts about 30 to 70 seconds, and gets stronger until you deliver your baby  Contractions do not go away with movement  The pain usually starts in your lower back and moves to your abdomen  Effacement  occurs when your cervix softens and thins, so it can easily open for the baby  You will not be able to feel effacement  Your healthcare provider will examine your cervix for effacement  Dilation  is widening of your cervix  Your healthcare provider will examine your cervix for dilation  Your cervix may start to dilate weeks before your baby is delivered  Your cervix will be fully opened and ready for delivery when it is dilated to 10 centimeters  Increased discharge  from your vagina may occur  It may be brown, pink, clear, or slightly bloody  This discharge may also be called bloody show  Bloody show is a mucus plug that forms and blocks your cervix during pregnancy  The discharge may mean that your cervix is opening up and getting ready for delivery  Rupture of membranes  is a sudden release of clear fluid from your vagina   Ruptured membranes means your water broke  Your healthcare provider may need to break your water if it does not happen on its own  False labor: You may have false labor signs, which are also called Harding Esquivel contractions  False labor is common and may happen several weeks or days before your actual labor  The contractions are not regular, and do not get closer together  The pain is usually mild, does not worsen, and is felt only in front  Harding Esquivel contractions may happen later in the day, and stop after you change position, walk, or rest   Follow up with your obstetrician or healthcare provider as directed:  Write down your questions so you remember to ask them during your visit  © Copyright [employer] 2022 Information is for End User's use only and may not be sold, redistributed or otherwise used for commercial purposes  All illustrations and images included in CareNotes® are the copyrighted property of A D A M , Inc  or [employer]   The above information is an  only  It is not intended as medical advice for individual conditions or treatments  Talk to your doctor, nurse or pharmacist before following any medical regimen to see if it is safe and effective for you

## 2022-09-22 ENCOUNTER — ROUTINE PRENATAL (OUTPATIENT)
Dept: OBGYN CLINIC | Facility: MEDICAL CENTER | Age: 32
End: 2022-09-22

## 2022-09-22 VITALS
BODY MASS INDEX: 31.86 KG/M2 | WEIGHT: 203 LBS | SYSTOLIC BLOOD PRESSURE: 110 MMHG | HEIGHT: 67 IN | DIASTOLIC BLOOD PRESSURE: 68 MMHG

## 2022-09-22 DIAGNOSIS — Z34.02 ENCOUNTER FOR SUPERVISION OF NORMAL FIRST PREGNANCY IN SECOND TRIMESTER: Primary | ICD-10-CM

## 2022-09-22 LAB
SL AMB  POCT GLUCOSE, UA: NEGATIVE
SL AMB POCT URINE PROTEIN: NEGATIVE

## 2022-09-22 PROCEDURE — PNV: Performed by: OBSTETRICS & GYNECOLOGY

## 2022-09-22 NOTE — ASSESSMENT & PLAN NOTE
Doing well following with DP, sugar levels have mostly ok, insulin increased from 16 to 22 units   Lab Results   Component Value Date    HGBA1C 5 4 09/20/2022

## 2022-09-22 NOTE — PROGRESS NOTES
Problem   26 Weeks Gestation of Pregnancy    Blood Type: A +        Pap 2022  Neg  GC/CT Neg  PN1 Labs: completed ,    H&H: 13 439 2  28 Week Labs:  Level 1:22 & 7/15/22  Level 2:  Tdap:  Flu:   GBS swab:     Blue folder:  Yellow folder:     Breast pump order:  L&D forms:    Delivery consent:   IOL:            Pre-Existing Type 2 Diabetes Mellitus With Hyperglycemia During Pregnancy in Second Trimester ([facility])    -Diagnosed T2DM 2 to 3 years ago  Was on Glyburide and Metformin  Stopped Glyburide in 2022    -Starting A1c 6 5%  Baseline UPCR 0 45  CMP within normal except for glucose    -On Levemir and Metformin    -Has appointment scheduled with endocrinology  26 weeks gestation of pregnancy  Amarilis Sanchez is a 36 y o   26w5d  TWG -2 268 kg (-5 lb)  Feels well  Denies LOF/CTX/VB  No concerns  AFP completed  Vaccines discussed    labor precautions reviewed  Encouraged adequate hydration and nutrition  Pregnancy Essential guide and Baby and Me center web site recommended                        Pre-existing type 2 diabetes mellitus with hyperglycemia during pregnancy in second trimester [facility])  Doing well following with DP, sugar levels have mostly ok, insulin increased from 16 to 22 units   Lab Results   Component Value Date    HGBA1C 5 4 2022

## 2022-09-23 LAB
DME PARACHUTE DELIVERY DATE REQUESTED: NORMAL
DME PARACHUTE ITEM DESCRIPTION: NORMAL
DME PARACHUTE ORDER STATUS: NORMAL
DME PARACHUTE SUPPLIER NAME: NORMAL
DME PARACHUTE SUPPLIER PHONE: NORMAL

## 2022-10-04 ENCOUNTER — ROUTINE PRENATAL (OUTPATIENT)
Dept: OBGYN CLINIC | Facility: MEDICAL CENTER | Age: 32
End: 2022-10-04
Payer: COMMERCIAL

## 2022-10-04 VITALS
DIASTOLIC BLOOD PRESSURE: 86 MMHG | WEIGHT: 207 LBS | HEIGHT: 67 IN | SYSTOLIC BLOOD PRESSURE: 124 MMHG | BODY MASS INDEX: 32.49 KG/M2

## 2022-10-04 DIAGNOSIS — E11.65 PRE-EXISTING TYPE 2 DIABETES MELLITUS WITH HYPERGLYCEMIA DURING PREGNANCY IN SECOND TRIMESTER (HCC): ICD-10-CM

## 2022-10-04 DIAGNOSIS — Z34.02 PRENATAL CARE, FIRST PREGNANCY IN SECOND TRIMESTER: ICD-10-CM

## 2022-10-04 DIAGNOSIS — Z23 NEED FOR TDAP VACCINATION: Primary | ICD-10-CM

## 2022-10-04 DIAGNOSIS — O99.212 OBESITY AFFECTING PREGNANCY IN SECOND TRIMESTER: ICD-10-CM

## 2022-10-04 DIAGNOSIS — O24.112 PRE-EXISTING TYPE 2 DIABETES MELLITUS WITH HYPERGLYCEMIA DURING PREGNANCY IN SECOND TRIMESTER (HCC): ICD-10-CM

## 2022-10-04 LAB
SL AMB  POCT GLUCOSE, UA: NORMAL
SL AMB POCT URINE PROTEIN: NORMAL

## 2022-10-04 PROCEDURE — PNV: Performed by: STUDENT IN AN ORGANIZED HEALTH CARE EDUCATION/TRAINING PROGRAM

## 2022-10-04 PROCEDURE — 90715 TDAP VACCINE 7 YRS/> IM: CPT

## 2022-10-04 PROCEDURE — 90471 IMMUNIZATION ADMIN: CPT

## 2022-10-04 NOTE — PROGRESS NOTES
Patient presents for a routine prenatal visit    28W3D  Good Fetal Movement  No LOF,bleeding,discharge or cramping  No current complaints at this time  Urine: -/-    Yellow folder given to patient and reviewed at today's visit  Pediatrician referral ordered today  28 week labs are completed  Tdap vaccine given in L deltoid  VIS given  Tolerated well    Lot #: [id number]  Exp: 10/14/2024

## 2022-10-04 NOTE — PROGRESS NOTES
36 y o  Bharti Schwarz at 28w3d presents for routine prenatal visit  She denies contractions/leakage of fluid/vaginal bleeding  She feels good fetal movement  Problem List Items Addressed This Visit        Endocrine    Pre-existing type 2 diabetes mellitus with hyperglycemia during pregnancy in second trimester ([address] )  Following with DP, glucoses well controlled  a1c 5 4  Growth US this week  Delivery 44 - 39 6/7 if continues to be well controlled       Other    Obesity affecting pregnancy in second trimester  Net weight loss so far - recommend 11-20# throughout      Other Visit Diagnoses     Prenatal care, first pregnancy in second trimester    -  Primary  tdap today  28 wk labs reviewed  F/u in 2 wks or prn        The patient was counseled about the labor process  She was counseled regarding potential reasons that she may need a  section including arrest of dilation/descent, non-reassuring fetal status, or worsening maternal status  She was counseled on the risks of  including bleeding, infection, and injury to surrounding structures including the bowel and bladder  She was counseled that there are medical and surgical methods to manage excessive postpartum bleeding  She was counseled that in the event of excessive blood loss, she may require blood transfusion which includes a small risk of blood borne diseases such as hepatitis and HIV  The patient is OK with receiving a blood transfusion if necessary  The patient had an opportunity to ask questions and signed consent  She was counseled about the possible need for operative delivery using the vacuum and the indications for doing so  She was counseled that there is a small risk of  complications including intracranial hemorrhage  The patient signed consent

## 2022-10-06 ENCOUNTER — ULTRASOUND (OUTPATIENT)
Dept: PERINATAL CARE | Facility: OTHER | Age: 32
End: 2022-10-06
Payer: COMMERCIAL

## 2022-10-06 VITALS
WEIGHT: 208.7 LBS | BODY MASS INDEX: 32.75 KG/M2 | DIASTOLIC BLOOD PRESSURE: 69 MMHG | SYSTOLIC BLOOD PRESSURE: 121 MMHG | HEART RATE: 87 BPM | HEIGHT: 67 IN

## 2022-10-06 DIAGNOSIS — Z36.2 ENCOUNTER FOR FOLLOW-UP ULTRASOUND OF FETAL ANATOMY: ICD-10-CM

## 2022-10-06 DIAGNOSIS — Z36.89 ENCOUNTER FOR ULTRASOUND TO ASSESS FETAL GROWTH: ICD-10-CM

## 2022-10-06 DIAGNOSIS — Z3A.28 28 WEEKS GESTATION OF PREGNANCY: Primary | ICD-10-CM

## 2022-10-06 PROCEDURE — 99213 OFFICE O/P EST LOW 20 MIN: CPT | Performed by: OBSTETRICS & GYNECOLOGY

## 2022-10-06 PROCEDURE — 76816 OB US FOLLOW-UP PER FETUS: CPT | Performed by: OBSTETRICS & GYNECOLOGY

## 2022-10-06 NOTE — LETTER
October 6, 2022     [address] [facility] [id number]    Patient: Yusuf De La Cruz   YOB: 1990   Date of Visit: 10/6/2022       Dear Moraima Contreras: Thank you for referring Flower Mccarthy to me for evaluation  Below are my notes for this consultation  If you have questions, please do not hesitate to call me  I look forward to following your patient along with you  Sincerely,        Chris Leos MD        CC: No Recipients  Chris Leos MD  10/6/2022  1:09 PM  Sign when Signing Visit  [address]: Ms Doug Plunkett was seen today at 28w5d for fetal growth and followup missed anatomy ultrasound  See ultrasound report under "OB Procedures" tab    Please don't hesitate to contact our office with any concerns or questions   -Chris Leos

## 2022-10-06 NOTE — PROGRESS NOTES
[address]: Ms yUen Barba was seen today at 28w5d for fetal growth and followup missed anatomy ultrasound  See ultrasound report under "OB Procedures" tab    Please don't hesitate to contact our office with any concerns or questions   -Jeronimo Alfaor

## 2022-10-12 ENCOUNTER — TELEPHONE (OUTPATIENT)
Dept: PERINATAL CARE | Facility: OTHER | Age: 32
End: 2022-10-12

## 2022-10-12 NOTE — TELEPHONE ENCOUNTER
Spoke with patient and confirmed her MFM appointment had to be rescheduled  Patient verbalized understanding of new time, date and location of appointment - 11/11/22  1:30  JESSE  Patient denies further questions

## 2022-10-17 DIAGNOSIS — E11.65 PRE-EXISTING TYPE 2 DIABETES MELLITUS WITH HYPERGLYCEMIA DURING PREGNANCY IN THIRD TRIMESTER (HCC): Primary | ICD-10-CM

## 2022-10-17 DIAGNOSIS — O99.213 OBESITY AFFECTING PREGNANCY IN THIRD TRIMESTER: ICD-10-CM

## 2022-10-17 DIAGNOSIS — O24.113 PRE-EXISTING TYPE 2 DIABETES MELLITUS WITH HYPERGLYCEMIA DURING PREGNANCY IN THIRD TRIMESTER (HCC): Primary | ICD-10-CM

## 2022-10-17 RX ORDER — INSULIN LISPRO 100 [IU]/ML
4 INJECTION, SOLUTION SUBCUTANEOUS
Qty: 15 ML | Refills: 0 | Status: SHIPPED | OUTPATIENT
Start: 2022-10-17

## 2022-10-17 NOTE — PROGRESS NOTES
Problem List Items Addressed This Visit        Endocrine    Pre-existing type 2 diabetes mellitus with hyperglycemia during pregnancy in third trimester ([facility] [address] ) - Primary     -Due to FBS>90; Semglee increased to 34 units daily   -Due to 2 hour post prandials >120; add Humalog 4 units before meals 1-2-3  Hold if not eating    -Continue Metformin    -Communicate every Monday and Thursday until glucose readings under better conrol  Lab Results   Component Value Date    HGBA1C 5 4 09/20/2022            Relevant Medications    insulin lispro (HumaLOG Romie KwikPen) 100 units/mL injection pen       Other    Obesity affecting pregnancy in third trimester    BMI 32 0-32 9,adult      Refer to glucose flowsheet

## 2022-10-17 NOTE — ASSESSMENT & PLAN NOTE
-Due to FBS>90; Semglee increased to 34 units daily   -Due to 2 hour post prandials >120; add Humalog 4 units before meals 1-2-3  Hold if not eating    -Continue Metformin    -Communicate every Monday and Thursday until glucose readings under better conrol    Lab Results   Component Value Date    HGBA1C 5 4 09/20/2022

## 2022-10-19 DIAGNOSIS — O99.212 OBESITY AFFECTING PREGNANCY IN SECOND TRIMESTER: ICD-10-CM

## 2022-10-19 DIAGNOSIS — O24.112 PRE-EXISTING TYPE 2 DIABETES MELLITUS WITH HYPERGLYCEMIA DURING PREGNANCY IN SECOND TRIMESTER (HCC): ICD-10-CM

## 2022-10-19 DIAGNOSIS — E11.65 PRE-EXISTING TYPE 2 DIABETES MELLITUS WITH HYPERGLYCEMIA DURING PREGNANCY IN SECOND TRIMESTER (HCC): ICD-10-CM

## 2022-10-19 DIAGNOSIS — Z3A.25 25 WEEKS GESTATION OF PREGNANCY: ICD-10-CM

## 2022-10-19 RX ORDER — PEN NEEDLE, DIABETIC 32GX 5/32"
NEEDLE, DISPOSABLE MISCELLANEOUS
Qty: 150 EACH | Refills: 1 | Status: SHIPPED | OUTPATIENT
Start: 2022-10-19

## 2022-10-21 ENCOUNTER — ROUTINE PRENATAL (OUTPATIENT)
Dept: OBGYN CLINIC | Facility: CLINIC | Age: 32
End: 2022-10-21

## 2022-10-21 VITALS
DIASTOLIC BLOOD PRESSURE: 82 MMHG | SYSTOLIC BLOOD PRESSURE: 132 MMHG | WEIGHT: 207 LBS | HEIGHT: 67 IN | BODY MASS INDEX: 32.49 KG/M2

## 2022-10-21 DIAGNOSIS — E11.65 PRE-EXISTING TYPE 2 DIABETES MELLITUS WITH HYPERGLYCEMIA DURING PREGNANCY IN THIRD TRIMESTER (HCC): ICD-10-CM

## 2022-10-21 DIAGNOSIS — Z3A.30 30 WEEKS GESTATION OF PREGNANCY: Primary | ICD-10-CM

## 2022-10-21 DIAGNOSIS — O24.113 PRE-EXISTING TYPE 2 DIABETES MELLITUS WITH HYPERGLYCEMIA DURING PREGNANCY IN THIRD TRIMESTER (HCC): ICD-10-CM

## 2022-10-21 LAB
SL AMB  POCT GLUCOSE, UA: NEGATIVE
SL AMB POCT URINE PROTEIN: NEGATIVE

## 2022-10-21 NOTE — PROGRESS NOTES
Patient is here for prenatal ob visit today  No question's or concerns at this time  GA: 30w6d  YOVANY: 22    Urine: Protein neg / Glucose neg  Denies loss of fluid, vaginal bleeding and contractions  Good fetal movement     Patient is having a girl   Yellow folder given  Delivery consent signed  Birth plan returned today  Ped ref placed  Breast pump ordered  Tdap given 10/4/22

## 2022-10-21 NOTE — PATIENT INSTRUCTIONS
Pregnancy at 31 to 34 Weeks   AMBULATORY CARE:   Changes happening with your body: You may continue to have symptoms such as shortness of breath, heartburn, contractions, or swelling of your ankles and feet  You may be gaining about 1 pound a week now  Seek care immediately if:   You develop a severe headache that does not go away  You have new or increased vision changes, such as blurred or spotted vision  You have new or increased swelling in your face or hands  You have vaginal spotting or bleeding  Your water broke or you feel warm water gushing or trickling from your vagina  Call your obstetrician if:   You have more than 5 contractions in 1 hour  You notice any changes in your baby's movements  You have abdominal cramps, pressure, or tightening  You have a change in vaginal discharge  You have chills or a fever  You have vaginal itching, burning, or pain  You have yellow, green, white, or foul-smelling vaginal discharge  You have pain or burning when you urinate, less urine than usual, or pink or bloody urine  You have questions or concerns about your condition or care  How to care for yourself at this stage of your pregnancy:       Eat a variety of healthy foods  Healthy foods include fruits, vegetables, whole-grain breads, low-fat dairy foods, beans, lean meats, and fish  Drink liquids as directed  Ask how much liquid to drink each day and which liquids are best for you  Limit caffeine to less than 200 milligrams each day  Limit your intake of fish to 2 servings each week  Choose fish low in mercury such as canned light tuna, shrimp, salmon, cod, or tilapia  Do not  eat fish high in mercury such as swordfish, tilefish, pramod mackerel, and shark  Manage heartburn  by eating 4 or 5 small meals each day instead of large meals  Avoid spicy food  Manage swelling  by lying down and putting your feet up  Take prenatal vitamins as directed    Your need for certain vitamins and minerals, such as folic acid, increases during pregnancy  Prenatal vitamins provide some of the extra vitamins and minerals you need  Prenatal vitamins may also help to decrease the risk of certain birth defects  Talk to your healthcare provider about exercise  Moderate exercise can help you stay fit  Your healthcare provider will help you plan an exercise program that is safe for you during pregnancy  Do not smoke  Smoking increases your risk of a miscarriage and other health problems during your pregnancy  Smoking can cause your baby to be born too early or weigh less at birth  Ask your healthcare provider for information if you need help quitting  Do not drink alcohol  Alcohol passes from your body to your baby through the placenta  It can affect your baby's brain development and cause fetal alcohol syndrome (FAS)  FAS is a group of conditions that causes mental, behavior, and growth problems  Talk to your healthcare provider before you take any medicines  Many medicines may harm your baby if you take them when you are pregnant  Do not take any medicines, vitamins, herbs, or supplements without first talking to your healthcare provider  Never use illegal or street drugs (such as marijuana or cocaine) while you are pregnant  Safety tips during pregnancy:   Avoid hot tubs and saunas  Do not use a hot tub or sauna while you are pregnant, especially during your first trimester  Hot tubs and saunas may raise your baby's temperature and increase the risk of birth defects  Avoid toxoplasmosis  This is an infection caused by eating raw meat or being around infected cat feces  It can cause birth defects, miscarriages, and other problems  Wash your hands after you touch raw meat  Make sure any meat is well-cooked before you eat it  Avoid raw eggs and unpasteurized milk  Use gloves or ask someone else to clean your cat's litter box while you are pregnant  Changes happening with your baby:  By 34 weeks, your baby may weigh more than 5 pounds  Your baby will be about 12 ½ inches long from the top of the head to the rump (baby's bottom)  Your baby is gaining about ½ pound a week  Your baby's eyes open and close now  Your baby's kicks and movements are more forceful at this time  What you need to know about prenatal care: Your healthcare provider will check your blood pressure and weight  You may also need the following:  A urine test  may also be done to check for sugar and protein  These can be signs of gestational diabetes or infection  Protein in your urine may also be a sign of preeclampsia  Preeclampsia is a condition that can develop during week 20 or later of your pregnancy  It causes high blood pressure, and it can cause problems with your kidneys and other organs  A gestational diabetes screen  may be done  Your healthcare provider may order either a 1-step or 2-step oral glucose tolerance test (OGTT)  1-step OGTT:  Your blood sugar level will be tested after you have not eaten for 8 hours (fasting)  You will then be given a glucose drink  Your level will be tested again 1 hour and 2 hours after you finish the drink  2-step OGTT:  You do not have to fast for the first part of the test  You will have the glucose drink at any time of day  Your blood sugar level will be checked 1 hour later  If your blood sugar is higher than a certain level, another test will be ordered  You will fast and your blood sugar level will be tested  You will have the glucose drink  Your blood will be tested again 1 hour, 2 hours, and 3 hours after you finish the glucose drink  A Tdap vaccine  may be recommended by your healthcare provider  Fundal height  is a measurement of your uterus to check your baby's growth  This number is usually the same as the number of weeks that you have been pregnant   Your healthcare provider may also check your baby's position  Your baby's heart rate  will be checked  Follow up with your obstetrician as directed:  Write down your questions so you remember to ask them during your visits  © Copyright [employer]  Information is for End User's use only and may not be sold, redistributed or otherwise used for commercial purposes  All illustrations and images included in CareNotes® are the copyrighted property of A D A M , Inc  or [employer]   The above information is an  only  It is not intended as medical advice for individual conditions or treatments  Talk to your doctor, nurse or pharmacist before following any medical regimen to see if it is safe and effective for you   Labor   AMBULATORY CARE:    (premature) labor  occurs when the uterus contracts and your cervix opens earlier than normal  The cervix is the opening of your uterus   labor happens after the 20th week of pregnancy but before the 37th week  You may have premature rupture of membranes (PROM)  PROM means your water broke before labor began  An early labor could cause you to have your baby before he or she is ready to be born  Common signs and symptoms include the following: You may not know that you are having  labor  It is common to have  contractions (tightening and relaxing of the uterus) and not notice them  The following are signs and symptoms that suggest a  labor:  Contractions that get stronger and closer together    Changes in vaginal discharge, such as more discharge or discharge that is watery or bloody     Low back pain     Pressure in the lower abdomen     Vaginal spotting or bleeding    Call your local emergency number (911 in the [address] [facility]) if:   You see or feel like there is something in your vagina  Call your doctor if:   You have bright red, painless vaginal bleeding  Your symptoms do not get better or they get worse      Your water broke or you feel warm water gushing or trickling from your vagina  You have contractions that get stronger and closer together for more than 1 hour  You notice a decrease in your baby's movement  You have abdominal cramps, pressure, or tightening  You have a change in vaginal discharge  You have a fever  You have burning when you urinate or you are urinating less than is normal for you  You have questions or concerns about your condition or care  How  labor is diagnosed: You may have one or more of the following tests to check for  labor:  A pelvic exam  is also called an internal or vaginal exam  During a pelvic exam, your healthcare provider will gently put a warmed speculum into your vagina  A speculum is a tool that opens your vagina  This lets your healthcare provider see if your cervix is opening  A vaginal ultrasound  uses sound waves to show pictures of your cervix and your baby inside your uterus  During this test, a small tube is placed into your vagina  This test will help your healthcare provider see if your cervix is opening  A fetal ultrasound  uses sound waves to show pictures of your baby inside your uterus  The movement, heart rate, and position of your baby can also be seen  A fetal fibronectin test  checks for a protein called fetal fibronectin in the cervix or vagina  Normally, there is no protein in cervical and vaginal secretions until the 20th week of pregnancy up to the end of pregnancy  Blood and urine tests  may be done to look for signs of infection  Treatment for  labor  may delay delivery  You may need any of the following:  Bed rest  may be recommended  You may need to lie on your left side, which improves circulation to the uterus and baby  Your healthcare provider will tell you when it is okay to get out of bed  Medicine  may be given to stop contractions if your baby is not ready to be born   You may also need certain medicines if your  labor cannot be stopped and your healthcare provider thinks you will have your baby early  These medicines help your baby's lungs, brain, and digestive organs mature  They also help decrease your baby's risk of being born with cerebral palsy  If you have PROM, fluid from your vagina or rectum will be checked for a strep infection  You may be given antibiotics to prevent a strep infection during delivery  Antibiotics may also be used to prevent labor from starting  You may also need steroids to decrease the risk for complications due to  labor  Self-care:   Rest  as much as possible  You may need to lie on your left side to improve circulation to the uterus and baby  You may be able to prevent  labor by resting and reducing your physical activity  Ask your healthcare provider about activities that are safe for you to do  Your healthcare provider or obstetrician may recommend that you avoid sexual intercourse  Ask your healthcare provider if exercise is safe  Drink liquids as directed  Ask how much liquid to drink each day and which liquids are best for you  Do not smoke  Your baby may not grow well and he or she may weigh less at birth if you smoke during pregnancy  Smoking also increases the risk that your baby will be born too early  Nicotine and other chemicals in cigarettes and cigars can cause lung damage  Ask your healthcare provider for information if you currently smoke and need help to quit  E-cigarettes or smokeless tobacco still contain nicotine  Talk to your healthcare provider before you use these products  Do not drink alcohol  Alcohol may harm your unborn baby and cause  labor  Maintain a healthy weight  A healthy weight may prevent  labor  Ask your healthcare provider how much weight you should gain during your pregnancy      Follow up with your doctor as directed:  Write down your questions so you remember to ask them during your visits  © Copyright [employer] 2022 Information is for End User's use only and may not be sold, redistributed or otherwise used for commercial purposes  All illustrations and images included in CareNotes® are the copyrighted property of A D A M , Inc  or Sri Iglesias  The above information is an  only  It is not intended as medical advice for individual conditions or treatments  Talk to your doctor, nurse or pharmacist before following any medical regimen to see if it is safe and effective for you

## 2022-10-21 NOTE — PROGRESS NOTES
Problem   30 Weeks Gestation of Pregnancy    Blood Type: A +        Pap 2022  Neg  GC/CT Neg  PN1 Labs: completed ,    H&H: 13  2  28 Week Labs: WNL  Level 1:22 & 7/15/22  Level 2:  Tdap: 10/4  Flu: declined  GBS swab:     Blue folder:  Yellow folder:     Breast pump order: submitted   L&D forms:    Delivery consent: signed   IOL:            Pre-Existing Type 2 Diabetes Mellitus With Hyperglycemia During Pregnancy in Third Trimester ([facility])    -Diagnosed T2DM 2 to 3 years ago  Was on Glyburide and Metformin  Stopped Glyburide in 2022    -Starting A1c 6 5%  Baseline UPCR 0 45  CMP within normal except for glucose    -On Levemir and Metformin    -Has appointment scheduled with endocrinology  30 weeks gestation of pregnancy  Charles Ling is a 36 y o  Mcdaniel Small who presents for routine PNV  28 week labs reviewed: wnl  Prepregnancy BMI 32 57 with a goal weight gain 5 kg (11 lb)-9 kg (19 lb)  TWG 0 318 kg (11 2 oz)  Denies OB complaints  Good fetal movement  Denies contractions, cramping, leakage of fluid or vaginal bleeding  Reviewed  labor precautions and FKCs  Pregnancy Essential guide and Baby and Me web site recommended     Pre-existing type 2 diabetes mellitus with hyperglycemia during pregnancy in third trimester ([address] )  Started on a mealtime insulin this last week  Reports BG are within ranges     Lab Results   Component Value Date    HGBA1C 5 4 2022

## 2022-10-21 NOTE — ASSESSMENT & PLAN NOTE
Started on a mealtime insulin this last week  Reports BG are within ranges     Lab Results   Component Value Date    HGBA1C 5 4 09/20/2022

## 2022-10-21 NOTE — ASSESSMENT & PLAN NOTE
Martha Bal is a 36 y o  Nonnie Dill who presents for routine PNV  28 week labs reviewed: wnl  Prepregnancy BMI 32 57 with a goal weight gain 5 kg (11 lb)-9 kg (19 lb)  TWG 0 318 kg (11 2 oz)  Denies OB complaints  Good fetal movement  Denies contractions, cramping, leakage of fluid or vaginal bleeding  Reviewed  labor precautions and FKCs     Pregnancy Essential guide and Baby and Me web site recommended

## 2022-11-03 ENCOUNTER — ROUTINE PRENATAL (OUTPATIENT)
Dept: OBGYN CLINIC | Facility: CLINIC | Age: 32
End: 2022-11-03

## 2022-11-03 ENCOUNTER — TELEPHONE (OUTPATIENT)
Dept: OBGYN CLINIC | Facility: CLINIC | Age: 32
End: 2022-11-03

## 2022-11-03 ENCOUNTER — TELEMEDICINE (OUTPATIENT)
Dept: PERINATAL CARE | Facility: CLINIC | Age: 32
End: 2022-11-03

## 2022-11-03 ENCOUNTER — ULTRASOUND (OUTPATIENT)
Dept: PERINATAL CARE | Facility: OTHER | Age: 32
End: 2022-11-03

## 2022-11-03 VITALS
HEIGHT: 67 IN | WEIGHT: 210.6 LBS | DIASTOLIC BLOOD PRESSURE: 74 MMHG | SYSTOLIC BLOOD PRESSURE: 120 MMHG | HEART RATE: 103 BPM | BODY MASS INDEX: 33.06 KG/M2

## 2022-11-03 VITALS — WEIGHT: 207 LBS | HEIGHT: 67 IN | BODY MASS INDEX: 32.49 KG/M2

## 2022-11-03 VITALS — DIASTOLIC BLOOD PRESSURE: 82 MMHG | WEIGHT: 209.8 LBS | SYSTOLIC BLOOD PRESSURE: 122 MMHG | BODY MASS INDEX: 32.86 KG/M2

## 2022-11-03 DIAGNOSIS — Z3A.32 32 WEEKS GESTATION OF PREGNANCY: ICD-10-CM

## 2022-11-03 DIAGNOSIS — E11.65 PRE-EXISTING TYPE 2 DIABETES MELLITUS WITH HYPERGLYCEMIA DURING PREGNANCY IN THIRD TRIMESTER (HCC): Primary | ICD-10-CM

## 2022-11-03 DIAGNOSIS — O99.213 OBESITY AFFECTING PREGNANCY IN THIRD TRIMESTER: ICD-10-CM

## 2022-11-03 DIAGNOSIS — O24.113 PRE-EXISTING TYPE 2 DIABETES MELLITUS WITH HYPERGLYCEMIA DURING PREGNANCY IN THIRD TRIMESTER (HCC): ICD-10-CM

## 2022-11-03 DIAGNOSIS — E11.65 PRE-EXISTING TYPE 2 DIABETES MELLITUS WITH HYPERGLYCEMIA DURING PREGNANCY IN THIRD TRIMESTER (HCC): ICD-10-CM

## 2022-11-03 DIAGNOSIS — R03.0 ELEVATED BLOOD PRESSURE READING IN OFFICE WITHOUT DIAGNOSIS OF HYPERTENSION: ICD-10-CM

## 2022-11-03 DIAGNOSIS — Z28.21 COVID-19 VACCINE SERIES DECLINED: ICD-10-CM

## 2022-11-03 DIAGNOSIS — O24.113 PRE-EXISTING TYPE 2 DIABETES MELLITUS WITH HYPERGLYCEMIA DURING PREGNANCY IN THIRD TRIMESTER (HCC): Primary | ICD-10-CM

## 2022-11-03 DIAGNOSIS — Z3A.32 32 WEEKS GESTATION OF PREGNANCY: Primary | ICD-10-CM

## 2022-11-03 DIAGNOSIS — Z28.310 COVID-19 VACCINE SERIES DECLINED: ICD-10-CM

## 2022-11-03 LAB
SL AMB  POCT GLUCOSE, UA: 1
SL AMB POCT URINE PROTEIN: ABNORMAL

## 2022-11-03 RX ORDER — METFORMIN HYDROCHLORIDE 500 MG/1
2000 TABLET, EXTENDED RELEASE ORAL
Qty: 360 TABLET | Refills: 1 | Status: SHIPPED | OUTPATIENT
Start: 2022-11-03

## 2022-11-03 RX ORDER — INSULIN GLARGINE-YFGN 100 [IU]/ML
34 INJECTION, SOLUTION SUBCUTANEOUS
Qty: 30 ML | Refills: 0 | Status: SHIPPED | OUTPATIENT
Start: 2022-11-03

## 2022-11-03 RX ORDER — INSULIN GLARGINE-YFGN 100 [IU]/ML
34 INJECTION, SOLUTION SUBCUTANEOUS
Qty: 30 ML | Refills: 0 | Status: SHIPPED | OUTPATIENT
Start: 2022-11-03 | End: 2022-11-03

## 2022-11-03 NOTE — PROGRESS NOTES
A fetal ultrasound and NST were completed  See Ob procedures in Epic for an interpretation and recommendations  Do not hesitate to contact us in [facility] if you have questions  Teri Juarez MD, [facility]  Maternal Fetal Medicine

## 2022-11-03 NOTE — ASSESSMENT & PLAN NOTE
-Last A1c 5 4% at goal, repeat A1c, CMP and urine protein creatinine ratio 11/11/2022   -Continue Semglee 34 units at 8 to 8:30 PM daily   -Continue Metformin 2000 mg with dinner    -Always have bedtime snack    -Due to 2 hours post meals above 120; increase Humalog 4-6-6-0 before meals 1-2-3   -Try to eat 3 meals and 3 snacks; consider adding snack drink mid-morning   -Continue SMBG fasting; 2 hours post meals and with hypoglycemia   -Continue reporting via flowsheet   -Glucose goals fasting 60-90; 2 hours post meal 120 or less  -Fetal growth ultrasounds every 4 to 6 weeks as recommended  -NST twice a week and PRESTON weekly   -Complete fetal kick counts  -Continue follow up with OB and MFM as recommended   -Stay in close contact with diabetes team   -Schedule follow up with your PCP for January 2023 for follow up diabetes care     Lab Results   Component Value Date    HGBA1C 5 4 09/20/2022

## 2022-11-03 NOTE — PATIENT INSTRUCTIONS
-Last A1c 5 4% at goal, repeat A1c, CMP and urine protein creatinine ratio 11/11/2022   -Continue Semglee 34 units at 8 to 8:30 PM daily   -Continue Metformin 2000 mg with dinner    -Always have bedtime snack    -Due to 2 hours post meals above 120; increase Humalog 4-6-6-0 before meals 1-2-3   -Try to eat 3 meals and 3 snacks; consider adding snack drink mid-morning   -Continue SMBG fasting; 2 hours post meals and with hypoglycemia   -Continue reporting via flowsheet   -Glucose goals fasting 60-90; 2 hours post meal 120 or less  -Fetal growth ultrasounds every 4 to 6 weeks as recommended  -NST twice a week and PRESTON weekly   -Complete fetal kick counts  -Continue follow up with OB and MFM as recommended   -Stay in close contact with diabetes team   -Schedule follow up with your PCP for January 2023 for follow up diabetes care

## 2022-11-03 NOTE — ASSESSMENT & PLAN NOTE
Continues to follow with DPP  Humalog increased today due to elevated PP readings  Has growth US later today  Twice weekly NSTs from now until delivery  Delivery timing recommended at 39 - 39 6/7 if continues to be well controlled  IOL and ARRIVE trial discussed  We reviewed timing subject to change based on clinical picture  Has no preference on date or physician  Will message triage and aware they will call when scheduled       Lab Results   Component Value Date    HGBA1C 5 4 09/20/2022

## 2022-11-03 NOTE — LETTER
November 3, 2022     Fidelia Nowak  [address] [facility] [facility]  [address], [facility] [id number]    Patient: Jeannette Youssef   YOB: 1990   Date of Visit: 11/3/2022       Dear Ms Patel: Thank you for referring Markell Kelly to me for evaluation  Below are my notes for this consultation  If you have questions, please do not hesitate to call me  I look forward to following your patient along with you  Sincerely,        Sari Portillo MD        CC: No Recipients  Sari Portillo MD  11/3/2022  6:07 PM  Sign when Signing Visit  A fetal ultrasound and NST were completed  See Ob procedures in Epic for an interpretation and recommendations  Do not hesitate to contact us in [facility] if you have questions  Kieran Shell MD, [facility]  Maternal Fetal Medicine

## 2022-11-03 NOTE — ASSESSMENT & PLAN NOTE
Blanca Mcintosh  is a 29 y o  Radhajan Barger @32w5d who presents for routine prenatal visit  28 wk labs - normal CBC, neg RPR  Growth scan - later today   TDAP - previously received  Flu vaccine - declined today - counseling provided   Plans to breastfeed  Pump - ordered     Reports good fetal movement  Denies LOF, vaginal bleeding, regular uterine contractions, cramping, headaches or visual changes  Reviewed PTL precautions and FKC

## 2022-11-03 NOTE — LETTER
[address] [place] [address]  MRN: [id number] MRN: [id number] MRN: [id number]  : [birth year]  YOVANY/GA: YOVANY/GA: YOVANY/GA:  Date:  Date:  Date:      [facility] [place]  MRN: [id number] MRN: [id number] MRN: [id number]  : [birth year] : 1990  YOVANY/GA: YOVANY/GA: YOVANY/GA:  Date:  Date:  Date:      [facility] [place] [place]  MRN: [id number] MRN: [id number] MRN: [id number]  : [birth year] : 1990  YOVANY/GA: YOVANY/GA: YOVANY/GA:  Date:  Date:  Date:      [facility] [place]  MRN: [id number] MRN: [id number] MRN: [id number]  : [birth year] : 1990  YOVANY/GA: YOVANY/GA: YOVANY/GA:  Date:  Date:  Date:      [facility] [place]  MRN: [id number] MRN: [id number] MRN: [id number]  : 1990 : 1990 : 1990  YOVANY/GA: YOVANY/GA: YOVANY/GA:  Date:  Date:  Date:      [facility]  MRN: [id number] MRN: [id number] MRN: [id number]  : 1990 : 1990 : 1990  YOVANY/GA: YOVANY/GA: YOVANY/GA:  Date:  Date:  Date:      [facility]  MRN: [id number] MRN: [id number] MRN: [id number]  : 1990 : 1990 : 1990  YOVANY/GA: YOVANY/GA: YOVANY/GA:  Date:  Date:  Date:

## 2022-11-03 NOTE — PROGRESS NOTES
Virtual Regular Visit    Verification of patient location:[place]    Patient is located in the following state in which I hold an active license [place]      Assessment/Plan:    Problem List Items Addressed This Visit        Endocrine    Pre-existing type 2 diabetes mellitus with hyperglycemia during pregnancy in third trimester ([facility] [address] ) - Primary     -Last A1c 5 4% at goal, repeat A1c, CMP and urine protein creatinine ratio 11/11/2022   -Continue Semglee 34 units at 8 to 8:30 PM daily   -Continue Metformin 2000 mg with dinner    -Always have bedtime snack    -Due to 2 hours post meals above 120; increase Humalog 4-6-6-0 before meals 1-2-3   -Try to eat 3 meals and 3 snacks; consider adding snack drink mid-morning   -Continue SMBG fasting; 2 hours post meals and with hypoglycemia   -Continue reporting via flowsheet   -Glucose goals fasting 60-90; 2 hours post meal 120 or less  -Fetal growth ultrasounds every 4 to 6 weeks as recommended  -NST twice a week and PRESTON weekly   -Complete fetal kick counts  -Continue follow up with OB and MFM as recommended   -Stay in close contact with diabetes team   -Schedule follow up with your PCP for January 2023 for follow up diabetes care  Lab Results   Component Value Date    HGBA1C 5 4 09/20/2022            Relevant Medications    Insulin Glargine-yfgn (Semglee, yfgn,) 100 UNIT/ML SOPN    metFORMIN (GLUCOPHAGE-XR) 500 mg 24 hr tablet    Other Relevant Orders    Hemoglobin A1C    Comprehensive metabolic panel    Protein / creatinine ratio, urine       Other    Obesity affecting pregnancy in third trimester     -First visit weight 208 lbs  -Current weight 207 lbs  -TWL -1 lb   -Continue GDM diet            Relevant Medications    Insulin Glargine-yfgn (Semglee, yfgn,) 100 UNIT/ML SOPN    metFORMIN (GLUCOPHAGE-XR) 500 mg 24 hr tablet    Other Relevant Orders    Hemoglobin A1C    Comprehensive metabolic panel    Protein / creatinine ratio, urine    32 weeks gestation of pregnancy Relevant Medications    Insulin Glargine-yfgn (Semglee, yfgn,) 100 UNIT/ML SOPN    metFORMIN (GLUCOPHAGE-XR) 500 mg 24 hr tablet    Other Relevant Orders    Hemoglobin A1C    Comprehensive metabolic panel    Protein / creatinine ratio, urine    BMI 32 0-32 9,adult    Relevant Medications    Insulin Glargine-yfgn (Semglee, yfgn,) 100 UNIT/ML SOPN    metFORMIN (GLUCOPHAGE-XR) 500 mg 24 hr tablet    Other Relevant Orders    Hemoglobin A1C    Comprehensive metabolic panel    Protein / creatinine ratio, urine               Reason for visit is   Chief Complaint   Patient presents with   • Virtual Regular Visit   • Diabetes Type 2        Encounter provider Flaco Zepeda, [facility]    Provider located at [address] [place] [address] Dr  [place] [address] [phone]      Recent Visits  No visits were found meeting these conditions  Showing recent visits within past 7 days and meeting all other requirements  Today's Visits  Date Type Provider Dept   22 [id number] [facility], [facility] today's visits and meeting all other requirements  Future Appointments  No visits were found meeting these conditions  Showing future appointments within next 150 days and meeting all other requirements       The patient was identified by name and date of birth  Katerin Damico was informed that this is a telemedicine visit and that the visit is being conducted through the [employer]  She agrees to proceed     My office door was closed  No one else was in the room  She acknowledged consent and understanding of privacy and security of the video platform  The patient has agreed to participate and understands they can discontinue the visit at any time  Patient is aware this is a billable service       Brynn Delgado is a 29 y o  female  28 5/7 weeks gestation T2DM on Metformin 2000 mg with dinner; Semglee 34 units at 8 to 8:30 PM daily and Humalog 4-4-6-0 before meals 1-2-3  Eating 3 meals and 2 snacks a day; tends to skip mid-morning snack due to lack of appetite; encouraged to add snack drink  Always has bedtime snack  Testing fasting and 2 hours post meals; reporting via flowsheet  Denies readings less than 60  Walks 30 minutes a day and is physically active at work  Has fetal growth scheduled for today and follow up with OB  HPI     Past Medical History:   Diagnosis Date   • 16 weeks gestation of pregnancy 7/14/2022   • Annual physical exam 7/14/2022   • Asthma    • BMI 29 0-29 9,adult 5/18/2021   • BMI 30 0-30 9,adult 5/6/2021   • BMI 31 0-31 9,adult 8/24/2021   • BMI 33 0-33 9,adult 12/14/2021   • Constipation 12/14/2021   • COVID-19 5/6/2021   • Diabetes mellitus ([facility] )    • DM2 (diabetes mellitus, type 2) ([facility] )    • Elevated blood pressure reading in office without diagnosis of hypertension 3/31/2022   • Elevated cortisol level    • Hand pain, right    • High triglycerides    • Hypertriglyceridemia 5/2/2021   • Microalbuminuria 5/18/2021   • Mild intermittent asthma without complication 3/3/3240   • Obesity    • Rhinitis, allergic 5/6/2021   • Sinobronchitis    • Skin rash 7/14/2022   • Type 2 diabetes mellitus ([facility] [address] )        Past Surgical History:   Procedure Laterality Date   • CHOLECYSTECTOMY     • WISDOM TOOTH EXTRACTION         Current Outpatient Medications   Medication Sig Dispense Refill   • Insulin Glargine-yfgn (Semglee, yfgn,) 100 UNIT/ML SOPN Inject 0 34 mL (34 Units total) under the skin daily at bedtime To be titrated  T2DM and pregnancy  90 day  30 mL 0   • metFORMIN (GLUCOPHAGE-XR) 500 mg 24 hr tablet Take 4 tablets (2,000 mg total) by mouth daily with dinner 360 tablet 1   • Blood Glucose Monitoring Suppl (OneTouch Verio Flex System) w/Device KIT Dispense 1 kit per insurance formulary   1 kit 0   • cholecalciferol (VITAMIN D3) 400 units tablet Take 400 Units by mouth daily     • Cranberry 125 MG TABS Take by mouth • insulin lispro (HumaLOG Romie KwikPen) 100 units/mL injection pen Inject 4 Units under the skin 3 (three) times a day before meals To be titrated  T2DM and pregnancy  15 mL 0   • Insulin Pen Needle (BD Pen Needle Ngoc U/F) 32G X 4 MM MISC Use up to 5 a day as recommended  150 each 1   • loratadine (CLARITIN) 10 mg tablet TAKE 1 TABLET BY MOUTH EVERY DAY 90 tablet 2   • Omega-3 Fatty Acids (fish oil) 1,000 mg Take 2 capsules by mouth see administration instructions 2 cap  qpm     • OneTouch Delica Lancets 52R MISC Use 6 a day or as directed  T2DM  150 each 6   • OneTouch Verio test strip Test 6 times a day and as instructed  T2DM and pregnancy  150 each 6   • Prenatal Vit-Fe Fumarate-FA (PRENATAL PO) Take by mouth       No current facility-administered medications for this visit  Allergies   Allergen Reactions   • Latex Rash       Review of Systems   Constitutional: Negative for fatigue and fever  HENT: Negative for congestion and trouble swallowing  Eyes: Negative for visual disturbance  Respiratory: Positive for shortness of breath (with activity )  Negative for cough  Cardiovascular: Negative for chest pain and palpitations  Gastrointestinal: Negative for constipation, diarrhea, nausea and vomiting  Endocrine: Positive for polyuria  Negative for polydipsia and polyphagia  Genitourinary: Negative for difficulty urinating and vaginal bleeding  Neurological: Negative for headaches  Psychiatric/Behavioral: Negative for sleep disturbance  Video Exam  Refer to glucose flowsheet until 11/3/2022 FBS; over the last week FBS 67-96; 2 hours post prandials   Vitals:    11/03/22 1008   Weight: 93 9 kg (207 lb)   Height: 5' 7" (1 702 m)       Physical Exam  HENT:      Head: Normocephalic  Nose: Nose normal    Eyes:      Conjunctiva/sclera: Conjunctivae normal    Pulmonary:      Effort: Pulmonary effort is normal    Musculoskeletal:      Cervical back: Normal range of motion  Neurological:      Mental Status: She is alert and oriented to person, place, and time  Psychiatric:         Mood and Affect: Mood normal          Behavior: Behavior normal          Thought Content: Thought content normal          Judgment: Judgment normal           I spent 24 minutes directly with the patient during this visit

## 2022-11-03 NOTE — PATIENT INSTRUCTIONS
Nonstress Test for Pregnancy   WHAT YOU NEED TO KNOW:   What do I need to know about a nonstress test?  A nonstress test measures your baby's heart rate and movements  Nonstress means that no stress will be placed on your baby during the test   How do I prepare for a nonstress test?  Your healthcare provider will talk to you about how to prepare for this test  He or she may tell you to eat and drink plenty of fluids before your test  If you smoke, you may be asked not to smoke within 2 hours before the test  He or she will also tell you which medicines to take or not take on the day of your test   What will happen during a nonstress test?  You may be asked to lie down or recline back for the test on a bed  One or 2 belts with sensors will be placed around your abdomen  Your baby's heart rate will be recorded with a machine  If your baby does not move, your baby may be asleep  Your healthcare provider may make a noise near your abdomen to try to wake your baby  The test usually takes about 20 minutes, but can take longer if your baby needs to be awakened  What do I need to know about the test results? Your baby will be expected to move at least 2 times for a certain amount of time  Your baby's heart rate will be expected to go up by a certain number of beats per minute during movement  If your baby does not move as expected, the test may need to be repeated or you may need other tests  CARE AGREEMENT:   You have the right to help plan your care  Learn about your health condition and how it may be treated  Discuss treatment options with your healthcare providers to decide what care you want to receive  You always have the right to refuse treatment  The above information is an  only  It is not intended as medical advice for individual conditions or treatments  Talk to your doctor, nurse or pharmacist before following any medical regimen to see if it is safe and effective for you    © Copyright [employer] 2022 Information is for [employer] use only and may not be sold, redistributed or otherwise used for commercial purposes  All illustrations and images included in CareNotes® are the copyrighted property of Sara BREEN  or [address] [place] [address] Natanael Valdes in Pregnancy   AMBULATORY CARE:   Kick counts  measure how much your baby is moving in your womb  A kick from your baby can be felt as a twist, turn, swish, roll, or jab  It is common to feel your baby kicking at 26 to 28 weeks of pregnancy  You may feel your baby kick as early as 20 weeks of pregnancy  You may want to start counting at 28 weeks  Contact your doctor immediately if:   You feel a change in the number of kicks or movements of your baby  You feel fewer than 10 kicks within 2 hours  You have questions or concerns about your baby's movements  Why measure kick counts:  Your baby's movement may provide information about your baby's health  He or she may move less, or not at all, if there are problems  Your baby may move less if he or she is not getting enough oxygen or nutrition from the placenta  Do not smoke while you are pregnant  Smoking decreases the amount of oxygen that gets to your baby  Talk to your healthcare provider if you need help to quit smoking  Tell your healthcare provider as soon as you feel a change in your baby's movements  When to measure kick counts:   Measure kick counts at the same time every day  Measure kick counts when your baby is awake and most active  Your baby may be most active in the evening  How to measure kick counts:  Check that your baby is awake before you measure kick counts  You can wake up your baby by lightly pushing on your belly, walking, or drinking something cold  Your healthcare provider may tell you different ways to measure kick counts  You may be told to do the following:  Use a chart or clock to keep track of the time you start and finish counting       Sit in a chair or lie on your left side  Place your hands on the largest part of your belly  Count until you reach 10 kicks  Write down how much time it takes to count 10 kicks  It may take 30 minutes to 2 hours to count 10 kicks  It should not take more than 2 hours to count 10 kicks  Follow up with your doctor as directed:  Write down your questions so you remember to ask them during your visits  © Copyright [employer] 2022 Information is for End User's use only and may not be sold, redistributed or otherwise used for commercial purposes  All illustrations and images included in CareNotes® are the copyrighted property of A D A M , Inc  or [facility] Rosario Chan   The above information is an  only  It is not intended as medical advice for individual conditions or treatments  Talk to your doctor, nurse or pharmacist before following any medical regimen to see if it is safe and effective for you

## 2022-11-03 NOTE — TELEPHONE ENCOUNTER
Called L & D, per Carmela Mac, pt scheduled for IOL ( Medical ), pre gestational diabetes, for Sunday, 12/18/22 8PM, into Monday, 12/19/22  Called pt to inform of date & time  Reviewed directions/protocol for L & D  Pt verbalized understanding  Routed to [facility] on-call, DR Dwight Saravia

## 2022-11-03 NOTE — PROGRESS NOTES
Patient here for PN visit  She denies any complaints  Good fetal movement  Flu vaccine offered; she declined  She has appointment with M this afternoon  She is checking her blood sugars  Urine neg for protein, +1 for glucose

## 2022-11-03 NOTE — PROGRESS NOTES
Problem List Items Addressed This Visit        Endocrine    Pre-existing type 2 diabetes mellitus with hyperglycemia during pregnancy in third trimester ([facility] [address] )       Other    Elevated blood pressure reading in office without diagnosis of hypertension    Obesity affecting pregnancy in third trimester    32 weeks gestation of pregnancy - Primary

## 2022-11-08 ENCOUNTER — ULTRASOUND (OUTPATIENT)
Dept: PERINATAL CARE | Facility: OTHER | Age: 32
End: 2022-11-08

## 2022-11-08 VITALS
BODY MASS INDEX: 33.24 KG/M2 | HEIGHT: 67 IN | SYSTOLIC BLOOD PRESSURE: 118 MMHG | WEIGHT: 211.8 LBS | DIASTOLIC BLOOD PRESSURE: 72 MMHG | HEART RATE: 97 BPM

## 2022-11-08 DIAGNOSIS — Z3A.33 33 WEEKS GESTATION OF PREGNANCY: ICD-10-CM

## 2022-11-08 DIAGNOSIS — O24.113 PRE-EXISTING TYPE 2 DIABETES MELLITUS WITH HYPERGLYCEMIA DURING PREGNANCY IN THIRD TRIMESTER (HCC): Primary | ICD-10-CM

## 2022-11-08 DIAGNOSIS — E11.65 PRE-EXISTING TYPE 2 DIABETES MELLITUS WITH HYPERGLYCEMIA DURING PREGNANCY IN THIRD TRIMESTER (HCC): Primary | ICD-10-CM

## 2022-11-08 NOTE — PROGRESS NOTES
NST was done today  Pt  Reported active fetal movement at home between visit  Daily fetal kick count reviewed and emphasized  Patient verbalized understanding of all and was receptive      Loan Bui

## 2022-11-08 NOTE — PROGRESS NOTES
[address]: Ms Jazmine Flores was seen today for NST (found under the pregnancy episode) which I reviewed the RN assessment and agree, and PRESTON (see ultrasound report under OB procedures tab)  See ultrasound report under "OB Procedures" tab  Blood sugars were reviewed  Insulin with meals was increased on 11/22 due to elevated fastings which are still sometimes elevated ()  She is due to report sugars on Thursday  She maintains close contact with our diabetic team  Reports good fetal movement  Please don't hesitate to contact our office with any concerns or questions   -Prabhu Solitario      I spent 10 minutes devoted to patient care (3 min chart preparation, 4 minutes face to face and 3 minutes documenting)

## 2022-11-08 NOTE — PATIENT INSTRUCTIONS
Thank you for choosing us for your  care today  If you have any questions about your ultrasound or care, please do not hesitate to contact us or your primary obstetrician  Some general instructions for your pregnancy are:    Protect against coronavirus: get vaccinated - pregnant women are increased risk of severe COVID  Notify your primary care doctor if you have any symptoms  Exercise: Aim for 22 minutes per day (150 minutes per week) of regular exercise  Walking is great! Nutrition: aim for calcium-rich and iron-rich foods as well as healthy sources of protein  Learn about Preeclampsia: preeclampsia is a common, serious high blood pressure complication in pregnancy  A blood pressure of 018UHLJ (systolic or top number) or 91YKDQ (diastolic or bottom number) is not normal and needs evaluation by your doctor  Aspirin is sometimes prescribed in early pregnancy to prevent preeclampsia in women with risk factors - ask your obstetrician if you should be on this medication  If you smoke, try to reduce how many cigarettes you smoke or try to quit completely  Do not vape  Other warning signs to watch out for in pregnancy or postpartum: chest pain, obstructed breathing or shortness of breath, seizures, thoughts of hurting yourself or your baby, bleeding, a painful or swollen leg, fever, or headache (see AWHONN POST-BIRTH Warning Signs campaign)  If these happen call 911  Itching is also not normal in pregnancy and if you experience this, especially over your hands and feet, potentially worse at night, notify your doctors

## 2022-11-09 ENCOUNTER — TELEPHONE (OUTPATIENT)
Dept: PEDIATRICS CLINIC | Facility: CLINIC | Age: 32
End: 2022-11-09

## 2022-11-09 NOTE — TELEPHONE ENCOUNTER
Pediatric Referral from an OB Practice:    Is this the patients first baby?: yes    Is it important for the pediatrician to have evening/weekend hours?: yes    Is it important that the pediatrician speaks a language other than English?: no   If YES - what language? :N/A    Do you prefer the idea of a small practice or a larger practice?  No pref    Pediatric practice chosen:[facility]

## 2022-11-11 ENCOUNTER — ROUTINE PRENATAL (OUTPATIENT)
Dept: PERINATAL CARE | Facility: OTHER | Age: 32
End: 2022-11-11

## 2022-11-11 VITALS
DIASTOLIC BLOOD PRESSURE: 72 MMHG | HEART RATE: 95 BPM | SYSTOLIC BLOOD PRESSURE: 120 MMHG | HEIGHT: 67 IN | BODY MASS INDEX: 33.63 KG/M2 | WEIGHT: 214.29 LBS

## 2022-11-11 DIAGNOSIS — E11.65 PRE-EXISTING TYPE 2 DIABETES MELLITUS WITH HYPERGLYCEMIA DURING PREGNANCY IN THIRD TRIMESTER (HCC): ICD-10-CM

## 2022-11-11 DIAGNOSIS — J45.20 MILD INTERMITTENT ASTHMA WITHOUT COMPLICATION: ICD-10-CM

## 2022-11-11 DIAGNOSIS — Z3A.33 33 WEEKS GESTATION OF PREGNANCY: Primary | ICD-10-CM

## 2022-11-11 DIAGNOSIS — O24.113 PRE-EXISTING TYPE 2 DIABETES MELLITUS WITH HYPERGLYCEMIA DURING PREGNANCY IN THIRD TRIMESTER (HCC): ICD-10-CM

## 2022-11-11 DIAGNOSIS — O99.213 OBESITY AFFECTING PREGNANCY IN THIRD TRIMESTER: ICD-10-CM

## 2022-11-11 NOTE — LETTER
NST sleeve cover sheet    Patient name: Raza Morris  : 1990  MRN: [id number]    YOVANY: Estimated Date of Delivery: 22    Obstetrician: ____________SLOGA_______________    Reason(s) for testing:  _______________Type II DM_____________________      Testing frequency:    _x_ 2x/wk  ___ 1x/wk  ___ Dopplers  ___ BPP?       Last growth scan: __________________________________________

## 2022-11-11 NOTE — LETTER
November 11, 2022     Muriel Boas, [place]  [address] [facility],[address] [facility]  [address], [facility] [phone]    Patient: Conner Seals   YOB: 1990   Date of Visit: 11/11/2022       Dear Ms Patel: Thank you for referring Betsy Keller to me for evaluation  Below are my notes for this consultation  If you have questions, please do not hesitate to call me  I look forward to following your patient along with you  Sincerely,        Tash Tanner MD        CC: No Recipients  Tash Tanner MD  11/11/2022  2:33 PM  Sign when Signing Visit  A fetal ultrasound was completed  See Ob procedures in Epic for an interpretation and recommendations  Do not hesitate to contact us in [facility] if you have questions  Roxana Hoang MD, [address] [place] [address]  Maternal Fetal Medicine

## 2022-11-11 NOTE — PROGRESS NOTES
A fetal ultrasound was completed  See Ob procedures in Epic for an interpretation and recommendations  Do not hesitate to contact us in [facility] if you have questions  Emilio Bess MD, [address] [facility]  Maternal Fetal Medicine

## 2022-11-15 ENCOUNTER — ROUTINE PRENATAL (OUTPATIENT)
Dept: OBGYN CLINIC | Facility: MEDICAL CENTER | Age: 32
End: 2022-11-15

## 2022-11-15 ENCOUNTER — ULTRASOUND (OUTPATIENT)
Dept: PERINATAL CARE | Facility: OTHER | Age: 32
End: 2022-11-15

## 2022-11-15 VITALS
DIASTOLIC BLOOD PRESSURE: 68 MMHG | BODY MASS INDEX: 33.74 KG/M2 | HEART RATE: 105 BPM | SYSTOLIC BLOOD PRESSURE: 116 MMHG | WEIGHT: 215 LBS | HEIGHT: 67 IN

## 2022-11-15 VITALS — WEIGHT: 213.4 LBS | SYSTOLIC BLOOD PRESSURE: 134 MMHG | DIASTOLIC BLOOD PRESSURE: 80 MMHG | BODY MASS INDEX: 33.42 KG/M2

## 2022-11-15 DIAGNOSIS — O99.213 OBESITY AFFECTING PREGNANCY IN THIRD TRIMESTER: ICD-10-CM

## 2022-11-15 DIAGNOSIS — R03.0 ELEVATED BLOOD PRESSURE READING IN OFFICE WITHOUT DIAGNOSIS OF HYPERTENSION: ICD-10-CM

## 2022-11-15 DIAGNOSIS — O24.113 PREGNANCY COMPLICATED BY PRE-EXISTING TYPE 2 DIABETES, THIRD TRIMESTER: ICD-10-CM

## 2022-11-15 DIAGNOSIS — Z3A.34 34 WEEKS GESTATION OF PREGNANCY: ICD-10-CM

## 2022-11-15 DIAGNOSIS — O24.113 PRE-EXISTING TYPE 2 DIABETES MELLITUS WITH HYPERGLYCEMIA DURING PREGNANCY IN THIRD TRIMESTER (HCC): ICD-10-CM

## 2022-11-15 DIAGNOSIS — Z3A.34 34 WEEKS GESTATION OF PREGNANCY: Primary | ICD-10-CM

## 2022-11-15 DIAGNOSIS — Z34.03 PRENATAL CARE, FIRST PREGNANCY, THIRD TRIMESTER: Primary | ICD-10-CM

## 2022-11-15 DIAGNOSIS — E11.65 PRE-EXISTING TYPE 2 DIABETES MELLITUS WITH HYPERGLYCEMIA DURING PREGNANCY IN THIRD TRIMESTER (HCC): ICD-10-CM

## 2022-11-15 LAB
SL AMB  POCT GLUCOSE, UA: 1
SL AMB POCT URINE PROTEIN: ABNORMAL

## 2022-11-15 NOTE — PROGRESS NOTES
Patient here for PN visit  She denies any complaints  Good fetal movement  NST completed today; has NST twice weekly  Urine neg for protein, +1 for glucose

## 2022-11-15 NOTE — LETTER
November 15, 2022     Wiley Bonilla [address]    Patient: Eulene Litten   YOB: 1990   Date of Visit: 11/15/2022       Dear Dr Lisette Carrillo: Thank you for referring Debbie Fowler to me for evaluation  Below are my notes for this consultation  If you have questions, please do not hesitate to call me  I look forward to following your patient along with you  Sincerely,        Dex Childress MD        CC: No Recipients  Dex Childress MD  11/14/2022  4:34 PM  Sign when Signing Visit  Please refer to the [facility] ultrasound report in Ob Procedures for additional information regarding today's visit

## 2022-11-16 NOTE — ASSESSMENT & PLAN NOTE
Bridget Shea  is a 29 y o  Amber Mraquez @34w3d who presents for routine prenatal visit  S/p TDAP  Flu vaccine declined  GBS at next visit  Plans to breastfeed  Pump - states she went online and selected/ordered pump, however, office notified that order cancelled  She will reach out to [facility] and notify this office if needs a new order  Perineal massage - reviewed  PP Contraception - options discussed, planning POP  Has yellow packet  Reports good fetal movement  Denies LOF, vaginal bleeding, regular uterine contractions, cramping, headaches or visual changes  Reviewed PTL precautions and FKC

## 2022-11-16 NOTE — ASSESSMENT & PLAN NOTE
Continues to follow with DPP  Lunchtime Humalog adjusted a few days ago and has reported better PP control since then  Growth US 11/3/22 - normal,EFW 77%  F/u growth in 4 weeks  Going for twice weekly NST, and weekly PRESTON  Plan for 39 week delivery, scheduled for IOL 12/19/22 with CR the night prior         Lab Results   Component Value Date    HGBA1C 5 4 09/20/2022

## 2022-11-16 NOTE — PROGRESS NOTES
Problem List Items Addressed This Visit        Endocrine    Pre-existing type 2 diabetes mellitus with hyperglycemia during pregnancy in third trimester ([address] )     Continues to follow with DPP  Lunchtime Humalog adjusted a few days ago and has reported better PP control since then  Growth US 11/3/22 - normal,EFW 77%  F/u growth in 4 weeks  Going for twice weekly NST, and weekly PRESTON  Plan for 39 week delivery, scheduled for IOL 12/19/22 with CR the night prior  Lab Results   Component Value Date    HGBA1C 5 4 09/20/2022               Other    Elevated blood pressure reading in office without diagnosis of hypertension    Obesity affecting pregnancy in third trimester     TWG 5 lbs  Goal 11-20  Continues DM diet  34 weeks gestation of pregnancy     Ashli Solomon  is a 29 y o  Farnaz Deng @34w3d who presents for routine prenatal visit  S/p TDAP  Flu vaccine declined  GBS at next visit  Plans to breastfeed  Pump - states she went online and selected/ordered pump, however, office notified that order cancelled  She will reach out to [facility] [place] pump and notify this office if needs a new order  Perineal massage - reviewed  PP Contraception - options discussed, planning POP  Has yellow packet  Reports good fetal movement  Denies LOF, vaginal bleeding, regular uterine contractions, cramping, headaches or visual changes  Reviewed PTL precautions and FKC               Other Visit Diagnoses     Prenatal care, first pregnancy, third trimester    -  Primary    Relevant Orders    POCT urine dip (Completed)

## 2022-11-17 ENCOUNTER — ROUTINE PRENATAL (OUTPATIENT)
Dept: PERINATAL CARE | Facility: OTHER | Age: 32
End: 2022-11-17

## 2022-11-17 VITALS
SYSTOLIC BLOOD PRESSURE: 114 MMHG | HEIGHT: 67 IN | BODY MASS INDEX: 33.78 KG/M2 | HEART RATE: 96 BPM | WEIGHT: 215.2 LBS | DIASTOLIC BLOOD PRESSURE: 70 MMHG

## 2022-11-17 DIAGNOSIS — E11.65 PRE-EXISTING TYPE 2 DIABETES MELLITUS WITH HYPERGLYCEMIA DURING PREGNANCY IN THIRD TRIMESTER (HCC): Primary | ICD-10-CM

## 2022-11-17 DIAGNOSIS — O24.113 PRE-EXISTING TYPE 2 DIABETES MELLITUS WITH HYPERGLYCEMIA DURING PREGNANCY IN THIRD TRIMESTER (HCC): Primary | ICD-10-CM

## 2022-11-17 DIAGNOSIS — Z3A.34 34 WEEKS GESTATION OF PREGNANCY: ICD-10-CM

## 2022-11-17 NOTE — PROGRESS NOTES
NST was done today  Pt  Reported active fetal movement at home between visit  Daily fetal kick count reviewed and emphasized  Patient verbalized understanding of all and was receptive      Bita Santoro RN

## 2022-11-22 ENCOUNTER — ROUTINE PRENATAL (OUTPATIENT)
Dept: PERINATAL CARE | Facility: OTHER | Age: 32
End: 2022-11-22

## 2022-11-22 VITALS
HEART RATE: 95 BPM | DIASTOLIC BLOOD PRESSURE: 76 MMHG | HEIGHT: 67 IN | BODY MASS INDEX: 33.84 KG/M2 | SYSTOLIC BLOOD PRESSURE: 124 MMHG | WEIGHT: 215.6 LBS

## 2022-11-22 DIAGNOSIS — O24.113 PRE-EXISTING TYPE 2 DIABETES MELLITUS WITH HYPERGLYCEMIA DURING PREGNANCY IN THIRD TRIMESTER (HCC): Primary | ICD-10-CM

## 2022-11-22 DIAGNOSIS — E11.65 PRE-EXISTING TYPE 2 DIABETES MELLITUS WITH HYPERGLYCEMIA DURING PREGNANCY IN THIRD TRIMESTER (HCC): Primary | ICD-10-CM

## 2022-11-22 DIAGNOSIS — Z3A.35 35 WEEKS GESTATION OF PREGNANCY: ICD-10-CM

## 2022-11-22 NOTE — PROGRESS NOTES
NST was done today  Pt  Reported active fetal movement at home between visit  Daily fetal kick count reviewed and emphasized  Patient verbalized understanding of all and was receptive      Bryan Rider RN

## 2022-11-22 NOTE — PATIENT INSTRUCTIONS
Thank you for choosing us for your  care today  If you have any questions about your ultrasound or care, please do not hesitate to contact us or your primary obstetrician  Some general instructions for your pregnancy are:    Protect against coronavirus: get vaccinated - pregnant women are increased risk of severe COVID  Notify your primary care doctor if you have any symptoms  Exercise: Aim for 22 minutes per day (150 minutes per week) of regular exercise  Walking is great! Nutrition: aim for calcium-rich and iron-rich foods as well as healthy sources of protein  Learn about Preeclampsia: preeclampsia is a common, serious high blood pressure complication in pregnancy  A blood pressure of 625TIYW (systolic or top number) or 16VMHI (diastolic or bottom number) is not normal and needs evaluation by your doctor  Aspirin is sometimes prescribed in early pregnancy to prevent preeclampsia in women with risk factors - ask your obstetrician if you should be on this medication  If you smoke, try to reduce how many cigarettes you smoke or try to quit completely  Do not vape  Other warning signs to watch out for in pregnancy or postpartum: chest pain, obstructed breathing or shortness of breath, seizures, thoughts of hurting yourself or your baby, bleeding, a painful or swollen leg, fever, or headache (see AWHONN POST-BIRTH Warning Signs campaign)  If these happen call 911  Itching is also not normal in pregnancy and if you experience this, especially over your hands and feet, potentially worse at night, notify your doctors

## 2022-11-22 NOTE — PROGRESS NOTES
[address]: Ms Leopoldo Horns was seen today for NST (found under the pregnancy episode) which I reviewed the RN assessment and agree  Her sugars are meeting target goals and she is in close contact with our diabetic team  Reports good fetal movement  Please don't hesitate to contact our office with any concerns or questions   -ALEXIS Scruggs      I spent 10 minutes devoted to patient care (3 min chart preparation, 4 minutes face to face and 3 minutes documenting)

## 2022-11-25 ENCOUNTER — ULTRASOUND (OUTPATIENT)
Dept: PERINATAL CARE | Facility: OTHER | Age: 32
End: 2022-11-25

## 2022-11-25 VITALS
SYSTOLIC BLOOD PRESSURE: 118 MMHG | BODY MASS INDEX: 34.15 KG/M2 | WEIGHT: 217.6 LBS | HEART RATE: 102 BPM | DIASTOLIC BLOOD PRESSURE: 80 MMHG | HEIGHT: 67 IN

## 2022-11-25 DIAGNOSIS — O24.113 PRE-EXISTING TYPE 2 DIABETES MELLITUS IN PREGNANCY IN THIRD TRIMESTER: Primary | ICD-10-CM

## 2022-11-25 DIAGNOSIS — Z3A.35 35 WEEKS GESTATION OF PREGNANCY: ICD-10-CM

## 2022-11-25 NOTE — PROGRESS NOTES
NST was done today  Pt  Reported active fetal movement at home between visit  Daily fetal kick count reviewed and emphasized  Patient verbalized understanding of all and was receptive      Sury Smith RN

## 2022-11-29 ENCOUNTER — ROUTINE PRENATAL (OUTPATIENT)
Dept: PERINATAL CARE | Facility: OTHER | Age: 32
End: 2022-11-29

## 2022-11-29 ENCOUNTER — TELEPHONE (OUTPATIENT)
Dept: OBGYN CLINIC | Facility: CLINIC | Age: 32
End: 2022-11-29

## 2022-11-29 ENCOUNTER — APPOINTMENT (OUTPATIENT)
Dept: LAB | Facility: MEDICAL CENTER | Age: 32
End: 2022-11-29

## 2022-11-29 ENCOUNTER — ROUTINE PRENATAL (OUTPATIENT)
Dept: OBGYN CLINIC | Facility: MEDICAL CENTER | Age: 32
End: 2022-11-29

## 2022-11-29 VITALS
HEART RATE: 84 BPM | BODY MASS INDEX: 34.28 KG/M2 | WEIGHT: 218.4 LBS | HEIGHT: 67 IN | DIASTOLIC BLOOD PRESSURE: 80 MMHG | SYSTOLIC BLOOD PRESSURE: 126 MMHG

## 2022-11-29 VITALS
HEIGHT: 67 IN | SYSTOLIC BLOOD PRESSURE: 140 MMHG | WEIGHT: 218 LBS | DIASTOLIC BLOOD PRESSURE: 88 MMHG | BODY MASS INDEX: 34.21 KG/M2

## 2022-11-29 DIAGNOSIS — E11.65 PRE-EXISTING TYPE 2 DIABETES MELLITUS WITH HYPERGLYCEMIA DURING PREGNANCY IN THIRD TRIMESTER (HCC): Primary | ICD-10-CM

## 2022-11-29 DIAGNOSIS — R03.0 ELEVATED BLOOD PRESSURE READING IN OFFICE WITHOUT DIAGNOSIS OF HYPERTENSION: ICD-10-CM

## 2022-11-29 DIAGNOSIS — O24.113 PRE-EXISTING TYPE 2 DIABETES MELLITUS WITH HYPERGLYCEMIA DURING PREGNANCY IN THIRD TRIMESTER (HCC): Primary | ICD-10-CM

## 2022-11-29 DIAGNOSIS — O24.113 PRE-EXISTING TYPE 2 DIABETES MELLITUS WITH HYPERGLYCEMIA DURING PREGNANCY IN THIRD TRIMESTER (HCC): ICD-10-CM

## 2022-11-29 DIAGNOSIS — Z3A.32 32 WEEKS GESTATION OF PREGNANCY: ICD-10-CM

## 2022-11-29 DIAGNOSIS — Z34.03 PRENATAL CARE, FIRST PREGNANCY, THIRD TRIMESTER: Primary | ICD-10-CM

## 2022-11-29 DIAGNOSIS — E11.65 PRE-EXISTING TYPE 2 DIABETES MELLITUS WITH HYPERGLYCEMIA DURING PREGNANCY IN THIRD TRIMESTER (HCC): ICD-10-CM

## 2022-11-29 DIAGNOSIS — O99.213 OBESITY AFFECTING PREGNANCY IN THIRD TRIMESTER: ICD-10-CM

## 2022-11-29 DIAGNOSIS — Z3A.36 36 WEEKS GESTATION OF PREGNANCY: ICD-10-CM

## 2022-11-29 LAB
ALBUMIN SERPL BCP-MCNC: 2.5 G/DL (ref 3.5–5)
ALP SERPL-CCNC: 208 U/L (ref 46–116)
ALT SERPL W P-5'-P-CCNC: 20 U/L (ref 12–78)
ANION GAP SERPL CALCULATED.3IONS-SCNC: 7 MMOL/L (ref 4–13)
AST SERPL W P-5'-P-CCNC: 21 U/L (ref 5–45)
BILIRUB SERPL-MCNC: 0.5 MG/DL (ref 0.2–1)
BUN SERPL-MCNC: 4 MG/DL (ref 5–25)
CALCIUM ALBUM COR SERPL-MCNC: 10.2 MG/DL (ref 8.3–10.1)
CALCIUM SERPL-MCNC: 9 MG/DL (ref 8.3–10.1)
CHLORIDE SERPL-SCNC: 105 MMOL/L (ref 96–108)
CO2 SERPL-SCNC: 25 MMOL/L (ref 21–32)
CREAT SERPL-MCNC: 0.5 MG/DL (ref 0.6–1.3)
CREAT UR-MCNC: 38.8 MG/DL
ERYTHROCYTE [DISTWIDTH] IN BLOOD BY AUTOMATED COUNT: 12.7 % (ref 11.6–15.1)
GFR SERPL CREATININE-BSD FRML MDRD: 130 ML/MIN/1.73SQ M
GLUCOSE SERPL-MCNC: 85 MG/DL (ref 65–140)
HCT VFR BLD AUTO: 38 % (ref 34.8–46.1)
HGB BLD-MCNC: 12.7 G/DL (ref 11.5–15.4)
MCH RBC QN AUTO: 30.8 PG (ref 26.8–34.3)
MCHC RBC AUTO-ENTMCNC: 33.4 G/DL (ref 31.4–37.4)
MCV RBC AUTO: 92 FL (ref 82–98)
PLATELET # BLD AUTO: 272 THOUSANDS/UL (ref 149–390)
PMV BLD AUTO: 10.2 FL (ref 8.9–12.7)
POTASSIUM SERPL-SCNC: 3.3 MMOL/L (ref 3.5–5.3)
PROT SERPL-MCNC: 6.4 G/DL (ref 6.4–8.4)
PROT UR-MCNC: 7 MG/DL
PROT/CREAT UR: 0.18 MG/G{CREAT} (ref 0–0.1)
RBC # BLD AUTO: 4.13 MILLION/UL (ref 3.81–5.12)
SL AMB  POCT GLUCOSE, UA: NORMAL
SL AMB POCT URINE PROTEIN: NORMAL
SODIUM SERPL-SCNC: 137 MMOL/L (ref 135–147)
WBC # BLD AUTO: 11.82 THOUSAND/UL (ref 4.31–10.16)

## 2022-11-29 NOTE — PATIENT INSTRUCTIONS
Thank you for choosing us for your  care today  If you have any questions about your ultrasound or care, please do not hesitate to contact us or your primary obstetrician  Some general instructions for your pregnancy are:    Exercise: Aim for 22 minutes per day (150 minutes per week) of regular exercise  Walking is great! Nutrition: Choose healthy sources of calcium, iron, and protein  Learn about Preeclampsia: preeclampsia is a common, serious high blood pressure complication in pregnancy  A blood pressure of 053KQWG (systolic or top number) or 05NIGD (diastolic or bottom number) is not normal and needs evaluation by your doctor  Aspirin is sometimes prescribed in early pregnancy to prevent preeclampsia in women with risk factors - ask your obstetrician if you should be on this medication  If you smoke, try to reduce how many cigarettes you smoke or try to quit completely  Do not vape  Other warning signs to watch out for in pregnancy or postpartum: chest pain, obstructed breathing or shortness of breath, seizures, thoughts of hurting yourself or your baby, bleeding, a painful or swollen leg, fever, or headache (see AWHONN POST-BIRTH Warning Signs campaign)  If these happen call 911  Itching is also not normal in pregnancy and if you experience this, especially over your hands and feet, potentially worse at night, notify your doctors

## 2022-11-29 NOTE — PROGRESS NOTES
29 y o   at 36w3d presents for routine prenatal visit  She denies contractions/leakage of fluid/vaginal bleeding  She feels good fetal movement  Problem List Items Addressed This Visit        Endocrine    Pre-existing type 2 diabetes mellitus with hyperglycemia during pregnancy in third trimester ([address] )  Well controlled on insulin  Continue 2x weekly testing       Other    Elevated blood pressure reading in office without diagnosis of hypertension  Elevated BP at 20 and 28 wks - repeats wnl  Today persistently elevated - sent for labs  She is asymptomatic   Has MFM f/u twice this week - aware that with continued elevated BP delivery would be recommended at 37 wks for gHTN    Obesity affecting pregnancy in third trimester  Weight gain 10# - on track   Other Visit Diagnoses     Prenatal care, first pregnancy, third trimester    -  Primary  F/u in 1 week or prn    Relevant Orders    Strep B DNA probe, amplification    POCT urine dip (Completed)

## 2022-11-29 NOTE — PROGRESS NOTES
NST was done today  Pt  Reported active fetal movement at home between visit  Daily fetal kick count reviewed and emphasized  Patient verbalized understanding of all and was receptive      Demarco Velez RN

## 2022-11-29 NOTE — PROGRESS NOTES
Patient presents for a routine prenatal visit    36W3D  Good Fetal Movement  No LOF,bleeding,discharge or cramping  No current complaints at this time       delivery and induction consents signed  UTD on Tdap vaccine  Declined flu vaccine    Urine: -/-

## 2022-11-29 NOTE — PROGRESS NOTES
[address]: Ms Nii Sotelo was seen today at 36w3d for NST (found under the pregnancy episode) which I reviewed the RN assessment and agree  Reports good fetal movement  Growth scheduled for Thursday  Sugars meeting target ranges except for fasting this AM which was elevated  Please don't hesitate to contact our office with any concerns or questions   -ALEXIS Gerard    I spent 10 minutes devoted to patient care (3 min chart preparation, 4 minutes face to face and 3  minutes documenting)

## 2022-12-01 ENCOUNTER — ULTRASOUND (OUTPATIENT)
Dept: PERINATAL CARE | Facility: OTHER | Age: 32
End: 2022-12-01

## 2022-12-01 VITALS
HEIGHT: 67 IN | SYSTOLIC BLOOD PRESSURE: 136 MMHG | BODY MASS INDEX: 34.31 KG/M2 | DIASTOLIC BLOOD PRESSURE: 80 MMHG | HEART RATE: 113 BPM | WEIGHT: 218.6 LBS

## 2022-12-01 DIAGNOSIS — E11.65 PRE-EXISTING TYPE 2 DIABETES MELLITUS WITH HYPERGLYCEMIA DURING PREGNANCY IN THIRD TRIMESTER (HCC): Primary | ICD-10-CM

## 2022-12-01 DIAGNOSIS — O24.113 PRE-EXISTING TYPE 2 DIABETES MELLITUS WITH HYPERGLYCEMIA DURING PREGNANCY IN THIRD TRIMESTER (HCC): Primary | ICD-10-CM

## 2022-12-01 DIAGNOSIS — Z3A.36 36 WEEKS GESTATION OF PREGNANCY: ICD-10-CM

## 2022-12-01 NOTE — PROGRESS NOTES
NST was done today  Pt  Reported active fetal movement at home between visit  Daily fetal kick count reviewed and emphasized  Patient verbalized understanding of all and was receptive      Nayan Weldon RN

## 2022-12-02 LAB — GP B STREP DNA SPEC QL NAA+PROBE: POSITIVE

## 2022-12-03 NOTE — PATIENT INSTRUCTIONS
Thank you for choosing us for your  care today  If you have any questions about your ultrasound or care, please do not hesitate to contact us or your primary obstetrician  Some general instructions for your pregnancy are:    Exercise: Aim for 22 minutes per day (150 minutes per week) of regular exercise  Walking is great! Nutrition: Choose healthy sources of calcium, iron, and protein  Learn about Preeclampsia: preeclampsia is a common, serious high blood pressure complication in pregnancy  A blood pressure of 490QMVP (systolic or top number) or 85RWWS (diastolic or bottom number) is not normal and needs evaluation by your doctor  Aspirin is sometimes prescribed in early pregnancy to prevent preeclampsia in women with risk factors - ask your obstetrician if you should be on this medication  If you smoke, try to reduce how many cigarettes you smoke or try to quit completely  Do not vape  Other warning signs to watch out for in pregnancy or postpartum: chest pain, obstructed breathing or shortness of breath, seizures, thoughts of hurting yourself or your baby, bleeding, a painful or swollen leg, fever, or headache (see AWHONN POST-BIRTH Warning Signs campaign)  If these happen call 911  Itching is also not normal in pregnancy and if you experience this, especially over your hands and feet, potentially worse at night, notify your doctors

## 2022-12-05 DIAGNOSIS — Z3A.25 25 WEEKS GESTATION OF PREGNANCY: ICD-10-CM

## 2022-12-05 DIAGNOSIS — E11.65 PRE-EXISTING TYPE 2 DIABETES MELLITUS WITH HYPERGLYCEMIA DURING PREGNANCY IN SECOND TRIMESTER (HCC): ICD-10-CM

## 2022-12-05 DIAGNOSIS — O24.112 PRE-EXISTING TYPE 2 DIABETES MELLITUS WITH HYPERGLYCEMIA DURING PREGNANCY IN SECOND TRIMESTER (HCC): ICD-10-CM

## 2022-12-05 DIAGNOSIS — O99.212 OBESITY AFFECTING PREGNANCY IN SECOND TRIMESTER: ICD-10-CM

## 2022-12-06 ENCOUNTER — ULTRASOUND (OUTPATIENT)
Dept: PERINATAL CARE | Facility: OTHER | Age: 32
End: 2022-12-06

## 2022-12-06 VITALS
DIASTOLIC BLOOD PRESSURE: 88 MMHG | HEART RATE: 94 BPM | WEIGHT: 217.8 LBS | SYSTOLIC BLOOD PRESSURE: 136 MMHG | BODY MASS INDEX: 34.18 KG/M2 | HEIGHT: 67 IN

## 2022-12-06 DIAGNOSIS — O24.113 PRE-EXISTING TYPE 2 DIABETES MELLITUS WITH HYPERGLYCEMIA DURING PREGNANCY IN THIRD TRIMESTER (HCC): Primary | ICD-10-CM

## 2022-12-06 DIAGNOSIS — Z3A.37 37 WEEKS GESTATION OF PREGNANCY: ICD-10-CM

## 2022-12-06 DIAGNOSIS — E11.65 PRE-EXISTING TYPE 2 DIABETES MELLITUS WITH HYPERGLYCEMIA DURING PREGNANCY IN THIRD TRIMESTER (HCC): Primary | ICD-10-CM

## 2022-12-06 RX ORDER — PEN NEEDLE, DIABETIC 32GX 5/32"
NEEDLE, DISPOSABLE MISCELLANEOUS
Qty: 150 EACH | Refills: 0 | Status: SHIPPED | OUTPATIENT
Start: 2022-12-06

## 2022-12-07 PROBLEM — T88.7XXA HYPERSENSITIVITY REACTION AT INJECTION SITE: Status: RESOLVED | Noted: 2022-07-21 | Resolved: 2022-12-07

## 2022-12-07 PROBLEM — Z3A.37 37 WEEKS GESTATION OF PREGNANCY: Status: ACTIVE | Noted: 2022-06-16

## 2022-12-07 PROBLEM — R39.9 UTI SYMPTOMS: Status: RESOLVED | Noted: 2020-04-26 | Resolved: 2022-12-07

## 2022-12-07 PROBLEM — T80.89XA HYPERSENSITIVITY REACTION AT INJECTION SITE: Status: RESOLVED | Noted: 2022-07-21 | Resolved: 2022-12-07

## 2022-12-08 ENCOUNTER — ROUTINE PRENATAL (OUTPATIENT)
Dept: PERINATAL CARE | Facility: OTHER | Age: 32
End: 2022-12-08

## 2022-12-08 ENCOUNTER — TELEPHONE (OUTPATIENT)
Dept: OBGYN CLINIC | Facility: CLINIC | Age: 32
End: 2022-12-08

## 2022-12-08 ENCOUNTER — HOSPITAL ENCOUNTER (INPATIENT)
Facility: HOSPITAL | Age: 32
LOS: 3 days | Discharge: HOME | End: 2022-12-11
Attending: STUDENT IN AN ORGANIZED HEALTH CARE EDUCATION/TRAINING PROGRAM | Admitting: OBSTETRICS & GYNECOLOGY

## 2022-12-08 VITALS
SYSTOLIC BLOOD PRESSURE: 130 MMHG | WEIGHT: 216.7 LBS | BODY MASS INDEX: 34.01 KG/M2 | HEIGHT: 67 IN | HEART RATE: 101 BPM | DIASTOLIC BLOOD PRESSURE: 78 MMHG

## 2022-12-08 DIAGNOSIS — Z3A.37 37 WEEKS GESTATION OF PREGNANCY: ICD-10-CM

## 2022-12-08 DIAGNOSIS — O24.113 PRE-EXISTING TYPE 2 DIABETES MELLITUS WITH HYPERGLYCEMIA DURING PREGNANCY IN THIRD TRIMESTER (HCC): Primary | ICD-10-CM

## 2022-12-08 DIAGNOSIS — Z3A.37 37 WEEKS GESTATION OF PREGNANCY: Primary | ICD-10-CM

## 2022-12-08 DIAGNOSIS — O13.3 GESTATIONAL HYPERTENSION, THIRD TRIMESTER: ICD-10-CM

## 2022-12-08 DIAGNOSIS — E11.65 PRE-EXISTING TYPE 2 DIABETES MELLITUS WITH HYPERGLYCEMIA DURING PREGNANCY IN THIRD TRIMESTER (HCC): Primary | ICD-10-CM

## 2022-12-08 PROBLEM — O13.9 GESTATIONAL HYPERTENSION: Status: ACTIVE | Noted: 2022-03-31

## 2022-12-08 LAB
ABO GROUP BLD: NORMAL
ALBUMIN SERPL BCP-MCNC: 3.2 G/DL (ref 3.5–5)
ALP SERPL-CCNC: 247 U/L (ref 34–104)
ALT SERPL W P-5'-P-CCNC: 9 U/L (ref 7–52)
ANION GAP SERPL CALCULATED.3IONS-SCNC: 10 MMOL/L (ref 4–13)
AST SERPL W P-5'-P-CCNC: 15 U/L (ref 13–39)
BASOPHILS # BLD AUTO: 0.01 THOUSANDS/ÂΜL (ref 0–0.1)
BASOPHILS NFR BLD AUTO: 0 % (ref 0–1)
BILIRUB SERPL-MCNC: 0.56 MG/DL (ref 0.2–1)
BLD GP AB SCN SERPL QL: NEGATIVE
BUN SERPL-MCNC: 5 MG/DL (ref 5–25)
CALCIUM ALBUM COR SERPL-MCNC: 9.5 MG/DL (ref 8.3–10.1)
CALCIUM SERPL-MCNC: 8.9 MG/DL (ref 8.4–10.2)
CHLORIDE SERPL-SCNC: 106 MMOL/L (ref 96–108)
CO2 SERPL-SCNC: 21 MMOL/L (ref 21–32)
CREAT SERPL-MCNC: 0.43 MG/DL (ref 0.6–1.3)
CREAT UR-MCNC: 47.8 MG/DL
EOSINOPHIL # BLD AUTO: 0.05 THOUSAND/ÂΜL (ref 0–0.61)
EOSINOPHIL NFR BLD AUTO: 0 % (ref 0–6)
ERYTHROCYTE [DISTWIDTH] IN BLOOD BY AUTOMATED COUNT: 12.5 % (ref 11.6–15.1)
GFR SERPL CREATININE-BSD FRML MDRD: 136 ML/MIN/1.73SQ M
GLUCOSE SERPL-MCNC: 105 MG/DL (ref 65–140)
GLUCOSE SERPL-MCNC: 108 MG/DL (ref 65–140)
HCT VFR BLD AUTO: 39.6 % (ref 34.8–46.1)
HGB BLD-MCNC: 13.7 G/DL (ref 11.5–15.4)
IMM GRANULOCYTES # BLD AUTO: 0.1 THOUSAND/UL (ref 0–0.2)
IMM GRANULOCYTES NFR BLD AUTO: 1 % (ref 0–2)
LYMPHOCYTES # BLD AUTO: 2.69 THOUSANDS/ÂΜL (ref 0.6–4.47)
LYMPHOCYTES NFR BLD AUTO: 18 % (ref 14–44)
MCH RBC QN AUTO: 30.5 PG (ref 26.8–34.3)
MCHC RBC AUTO-ENTMCNC: 34.6 G/DL (ref 31.4–37.4)
MCV RBC AUTO: 88 FL (ref 82–98)
MONOCYTES # BLD AUTO: 0.75 THOUSAND/ÂΜL (ref 0.17–1.22)
MONOCYTES NFR BLD AUTO: 5 % (ref 4–12)
NEUTROPHILS # BLD AUTO: 11.52 THOUSANDS/ÂΜL (ref 1.85–7.62)
NEUTS SEG NFR BLD AUTO: 76 % (ref 43–75)
NRBC BLD AUTO-RTO: 0 /100 WBCS
PLATELET # BLD AUTO: 302 THOUSANDS/UL (ref 149–390)
PMV BLD AUTO: 9.7 FL (ref 8.9–12.7)
POTASSIUM SERPL-SCNC: 3.4 MMOL/L (ref 3.5–5.3)
PROT SERPL-MCNC: 6.3 G/DL (ref 6.4–8.4)
PROT UR-MCNC: 14 MG/DL
PROT/CREAT UR: 0.29 MG/G{CREAT} (ref 0–0.1)
RBC # BLD AUTO: 4.49 MILLION/UL (ref 3.81–5.12)
RH BLD: POSITIVE
SODIUM SERPL-SCNC: 137 MMOL/L (ref 135–147)
SPECIMEN EXPIRATION DATE: NORMAL
WBC # BLD AUTO: 15.12 THOUSAND/UL (ref 4.31–10.16)

## 2022-12-08 PROCEDURE — 3E033VJ INTRODUCTION OF OTHER HORMONE INTO PERIPHERAL VEIN, PERCUTANEOUS APPROACH: ICD-10-PCS | Performed by: OBSTETRICS & GYNECOLOGY

## 2022-12-08 PROCEDURE — 3E0P7VZ INTRODUCTION OF HORMONE INTO FEMALE REPRODUCTIVE, VIA NATURAL OR ARTIFICIAL OPENING: ICD-10-PCS | Performed by: OBSTETRICS & GYNECOLOGY

## 2022-12-08 PROCEDURE — 4A1HXCZ MONITORING OF PRODUCTS OF CONCEPTION, CARDIAC RATE, EXTERNAL APPROACH: ICD-10-PCS | Performed by: OBSTETRICS & GYNECOLOGY

## 2022-12-08 RX ORDER — SODIUM CHLORIDE 9 MG/ML
125 INJECTION, SOLUTION INTRAVENOUS CONTINUOUS
Status: DISCONTINUED | OUTPATIENT
Start: 2022-12-08 | End: 2022-12-11 | Stop reason: HOSPADM

## 2022-12-08 RX ORDER — INSULIN LISPRO 100 [IU]/ML
6 INJECTION, SOLUTION INTRAVENOUS; SUBCUTANEOUS ONCE
Status: COMPLETED | OUTPATIENT
Start: 2022-12-08 | End: 2022-12-08

## 2022-12-08 RX ORDER — INSULIN GLARGINE 100 [IU]/ML
17 INJECTION, SOLUTION SUBCUTANEOUS
Status: COMPLETED | OUTPATIENT
Start: 2022-12-08 | End: 2022-12-09

## 2022-12-08 RX ORDER — ONDANSETRON 2 MG/ML
4 INJECTION INTRAMUSCULAR; INTRAVENOUS EVERY 6 HOURS PRN
Status: DISCONTINUED | OUTPATIENT
Start: 2022-12-08 | End: 2022-12-09

## 2022-12-08 RX ADMIN — Medication 50 MCG: at 18:22

## 2022-12-08 RX ADMIN — INSULIN LISPRO 6 UNITS: 100 INJECTION, SOLUTION INTRAVENOUS; SUBCUTANEOUS at 17:41

## 2022-12-08 NOTE — PROGRESS NOTES
[address]: Ms Lanie Lam was seen today for NST (found under the pregnancy episode) which I reviewed the RN assessment and agree, and PRESTON (see ultrasound report under OB procedures tab)  Please don't hesitate to contact our office with any concerns or questions    Bladimir Green MD

## 2022-12-08 NOTE — ASSESSMENT & PLAN NOTE
Admit to OBGYN for IOL for GHTN  Clear liquid diet   F/u T&S, CBC, RPR   IVF LR 125cc/hr   Continuous fetal monitoring and tocometry   Analgesia at maternal request   Vertex by TAUS  Induction plan : Cytotec and FB

## 2022-12-08 NOTE — TELEPHONE ENCOUNTER
----- Message from Geoffrey Chacon MD sent at 12/8/2022  2:09 PM EST -----  This patient was supposed to be seen in triage today   Sent at 11:30 am  If she does not present in the next couple of hours, can we please follow up    Modesto Hernandez

## 2022-12-08 NOTE — LETTER
December 8, 2022     [facility]  [address]    Patient: Polo Ferguson   YOB: 1990   Date of Visit: 12/8/2022       Dear Dr Geovanny Roy:    Maryan Taomichelle will present to [address] [place] [address] early this afternoon for induction as we discussed       Sincerely,        Josiane Zendejas MD        CC: No Recipients

## 2022-12-08 NOTE — ASSESSMENT & PLAN NOTE
Systolic (87KFV), PJZ:178 , Min:109 , OFV:774   Diastolic (38BZV), QDD:06, Min:56, Max:70    Denies headache, vision changes, chest pain, shortness of breath, RUQ pain

## 2022-12-08 NOTE — H&P
[address] [facility] JULIET Bey 29 y o  female MRN: [id number]  Unit/Bed#Chelsea Greer Encounter: [id number]    Assessment: 29 y o   at 37w5d admitted for IOL for GHTN  SVE: 0/0/ -4  FHT: Cat 1, rate 150 bpm, moderate variability, 15x15 accelerations present, no decelerations  Clinical EFW: 72% (as of 22) ; Cephalic confirmed by TAUS  GBS status: Positive, No PCN allergy  Postpartum contraception plan: OCPs    Plan:   Pre-existing type 2 diabetes mellitus with hyperglycemia during pregnancy in third trimester [facility]  Assessment & Plan    Lab Results   Component Value Date    HGBA1C 5 3 2022     On insulin - managed on    - 4u breakfast, 6u Humalog with lunch and dinner   - 0 34 u Semglee nightly   - Metformin 200mg daily    Gestational hypertension  Assessment & Plan  Systolic (38ARL), QOL:731 , Min:120 , JVI:105   Diastolic (23CTG), VRJ:60, Min:73, Max:83    Denies headache, vision changes, chest pain, shortness of breath, RUQ pain      * 37 weeks gestation of pregnancy  Assessment & Plan  Admit to OBGYN for IOL for GHTN  Clear liquid diet   F/u T&S, CBC, RPR   IVF LR 125cc/hr   Continuous fetal monitoring and tocometry   Analgesia at maternal request   Vertex by TAUS  Induction plan : Cytotec and FB         Discussed case and plan w/ Dr Cesar Ramos    Chief Complaint: Here for induction    HPI: Jeanne Moore is a 29 y o  Tony Pump with an YOVANY of 2022, by Ultrasound at 37w5d who is being admitted for IOL for GHTN  She denies having uterine contractions, has no LOF, and reports no VB  She states she has felt good FM      Patient Active Problem List   Diagnosis   • Mild intermittent asthma without complication   • Hypertriglyceridemia   • Microalbuminuria   • Gestational hypertension   • Pre-existing type 2 diabetes mellitus with hyperglycemia during pregnancy in third trimester ([address] )   • Obesity affecting pregnancy in third trimester   • 37 weeks gestation of pregnancy   • COVID-19 vaccine series declined       Baby complications/comments: none    Review of Systems   Constitutional: Negative for chills and fever  HENT: Negative for ear pain and sore throat  Eyes: Negative for pain and visual disturbance  Respiratory: Negative for cough and shortness of breath  Cardiovascular: Negative for chest pain and palpitations  Gastrointestinal: Negative for abdominal pain, nausea and vomiting  Genitourinary: Negative for dysuria, hematuria, vaginal bleeding and vaginal discharge  Musculoskeletal: Negative for arthralgias and back pain  Skin: Negative for color change and rash  Neurological: Negative for seizures and syncope  All other systems reviewed and are negative        OB Hx:  OB History    Para Term  AB Living   1 0 0 0 0 0   SAB IAB Ectopic Multiple Live Births   0 0 0 0 0      # Outcome Date GA Lbr Jack/2nd Weight Sex Delivery Anes PTL Lv   1 Current                Past Medical Hx:  Past Medical History:   Diagnosis Date   • 16 weeks gestation of pregnancy 2022   • Annual physical exam 2022   • Asthma    • BMI 29 0-29 9,adult 2021   • BMI 30 0-30 9,adult 2021   • BMI 31 0-31 9,adult 2021   • BMI 33 0-33 9,adult 2021   • Constipation 2021   • COVID-19 2021   • Diabetes mellitus ([address] )    • DM2 (diabetes mellitus, type 2) ([address] )    • Elevated blood pressure reading in office without diagnosis of hypertension 3/31/2022   • Elevated cortisol level    • Hand pain, right    • High triglycerides    • Hypertriglyceridemia 2021   • Microalbuminuria 2021   • Mild intermittent asthma without complication    • Obesity    • Rhinitis, allergic 2021   • Sinobronchitis    • Skin rash 2022   • Type 2 diabetes mellitus ([address] )        Past Surgical hx:  Past Surgical History:   Procedure Laterality Date   • CHOLECYSTECTOMY     • WISDOM TOOTH EXTRACTION         Social Hx:  Alcohol use: denies  Tobacco use: denies  Other substance use: denies    Allergies   Allergen Reactions   • Latex Rash       Medications Prior to Admission   Medication   • Blood Glucose Monitoring Suppl (OneTouch Verio Flex System) w/Device KIT   • cholecalciferol (VITAMIN D3) 400 units tablet   • Cranberry 125 MG TABS   • Insulin Glargine-yfgn (Semglee, yfgn,) 100 UNIT/ML SOPN   • insulin lispro (HumaLOG Romie KwikPen) 100 units/mL injection pen   • Insulin Pen Needle (BD Pen Needle Ngoc U/F) 32G X 4 MM MISC   • loratadine (CLARITIN) 10 mg tablet   • metFORMIN (GLUCOPHAGE-XR) 500 mg 24 hr tablet   • Omega-3 Fatty Acids (fish oil) 1,000 mg   • OneTouch Delica Lancets 25A MISC   • OneTouch Verio test strip   • Prenatal Vit-Fe Fumarate-FA (PRENATAL PO)       Objective:  HR:  [101] 101  BP: (130)/(78) 130/78  There is no height or weight on file to calculate BMI  Physical Exam:  Physical Exam  Constitutional:       General: She is not in acute distress  Appearance: Normal appearance  She is not ill-appearing or toxic-appearing  Genitourinary:      Vulva normal    HENT:      Head: Normocephalic and atraumatic  Right Ear: External ear normal       Left Ear: External ear normal       Nose: Nose normal       Mouth/Throat:      Mouth: Mucous membranes are moist    Eyes:      General: Lids are normal       Extraocular Movements: Extraocular movements intact  Conjunctiva/sclera: Conjunctivae normal    Cardiovascular:      Rate and Rhythm: Normal rate and regular rhythm  Pulses: Normal pulses  Heart sounds: Normal heart sounds  No murmur heard  Pulmonary:      Effort: Pulmonary effort is normal       Breath sounds: Normal breath sounds  Abdominal:      General: There is no distension  Tenderness: There is no abdominal tenderness  Comments: gravid   Musculoskeletal:      Cervical back: Full passive range of motion without pain and normal range of motion  Right lower leg: No edema  Left lower leg: No edema        Right foot: Normal range of motion  Left foot: Normal range of motion  Feet:      Right foot:      Skin integrity: Warmth and dry skin present  Neurological:      General: No focal deficit present  Mental Status: She is alert  Mental status is at baseline  Motor: No weakness  Skin:     General: Skin is warm and dry  Psychiatric:         Mood and Affect: Mood normal          Judgment: Judgment normal    Vitals reviewed  Exam conducted with a chaperone present              FHT:  Baseline Rate: 145 bpm  Variability: Moderate 6-25 bpm  Accelerations: 15 x 15 or greater, With fetal movment  Decelerations: Variable (x1)  FHR Category: Category II    TOCO:   Contraction Frequency (minutes): 2-6  Contraction Duration (seconds): 40-80  Contraction Quality: Mild    Lab Results   Component Value Date    WBC 11 82 (H) 11/29/2022    HGB 12 7 11/29/2022    HCT 38 0 11/29/2022     11/29/2022     Lab Results   Component Value Date    K 3 3 (L) 11/29/2022     11/29/2022    CO2 25 11/29/2022    BUN 4 (L) 11/29/2022    CREATININE 0 50 (L) 11/29/2022    AST 21 11/29/2022    ALT 20 11/29/2022     Prenatal Labs: Reviewed      Blood type: A positive  Antibody: Negative  GBS: Positive  HIV: non-reactive  Rubella: Immune  VDRL/RPR: Non reactive  HBsAg: Negative  Chlamydia: Negative  Gonorrhea: Negative  Diabetes 1 hour screen: Abnormal, 201  Platelets: 296,198  Hgb: 12 7  >2 Midnights  INPATIENT     Signature/Title: Laura Crawford DO  Date: 12/8/2022  Time: 2:36 PM

## 2022-12-08 NOTE — ASSESSMENT & PLAN NOTE
Lab Results   Component Value Date    HGBA1C 5 3 11/29/2022     Pregnancy regimen:  managed on    - 4u breakfast, 6u Humalog with lunch and dinner   - 0 34 u Semglee nightly   - Metformin 2000mg daily    Pre-pregnancy regimen  10U Levemir qH and 2000mg Metformin daily   Follow up as outpatient

## 2022-12-08 NOTE — PROGRESS NOTES
NST was done today  Pt  Reported active fetal movement at home between visit  Daily fetal kick count reviewed and emphasized  Patient verbalized understanding of all and was receptive      Eber Vasquez RN

## 2022-12-08 NOTE — PLAN OF CARE
Problem: PAIN - ADULT  Goal: Verbalizes/displays adequate comfort level or baseline comfort level  Description: Interventions:  - Encourage patient to monitor pain and request assistance  - Assess pain using appropriate pain scale  - Administer analgesics based on type and severity of pain and evaluate response  - Implement non-pharmacological measures as appropriate and evaluate response  - Consider cultural and social influences on pain and pain management  - Notify physician/advanced practitioner if interventions unsuccessful or patient reports new pain  Outcome: Progressing     Problem: INFECTION - ADULT  Goal: Absence or prevention of progression during hospitalization  Description: INTERVENTIONS:  - Assess and monitor for signs and symptoms of infection  - Monitor lab/diagnostic results  - Monitor all insertion sites, i e  indwelling lines, tubes, and drains  - Monitor endotracheal if appropriate and nasal secretions for changes in amount and color  - [place] appropriate cooling/warming therapies per order  - Administer medications as ordered  - Instruct and encourage patient and family to use good hand hygiene technique  - Identify and instruct in appropriate isolation precautions for identified infection/condition  Outcome: Progressing  Goal: Absence of fever/infection during neutropenic period  Description: INTERVENTIONS:  - Monitor WBC    Outcome: Progressing     Problem: SAFETY ADULT  Goal: Patient will remain free of falls  Description: INTERVENTIONS:  - Educate patient/family on patient safety including physical limitations  - Instruct patient to call for assistance with activity   - Consult OT/PT to assist with strengthening/mobility   - Keep Call bell within reach  - Keep bed low and locked with side rails adjusted as appropriate  - Keep care items and personal belongings within reach  - Initiate and maintain comfort rounds  - Apply yellow socks and bracelet for high fall risk patients  - Consider moving patient to room near nurses station  Outcome: Progressing  Goal: Maintain or return to baseline ADL function  Description: INTERVENTIONS:  -  Assess patient's ability to carry out ADLs; assess patient's baseline for ADL function and identify physical deficits which impact ability to perform ADLs (bathing, care of mouth/teeth, toileting, grooming, dressing, etc )  - Assess/evaluate cause of self-care deficits   - Assess range of motion  - Assess patient's mobility; develop plan if impaired  - Assess patient's need for assistive devices and provide as appropriate  - Encourage maximum independence but intervene and supervise when necessary  - Involve family in performance of ADLs  - Assess for home care needs following discharge   - Consider OT consult to assist with ADL evaluation and planning for discharge  - Provide patient education as appropriate  Outcome: Progressing  Goal: Maintains/Returns to pre admission functional level  Description: INTERVENTIONS:  - Perform BMAT or MOVE assessment daily    - Set and communicate daily mobility goal to care team and patient/family/caregiver     - Collaborate with rehabilitation services on mobility goals if consulted  - Record patient progress and toleration of activity level   Outcome: Progressing     Problem: DISCHARGE PLANNING  Goal: Discharge to home or other facility with appropriate resources  Description: INTERVENTIONS:  - Identify barriers to discharge w/patient and caregiver  - Arrange for needed discharge resources and transportation as appropriate  - Identify discharge learning needs (meds, wound care, etc )  - Arrange for interpretive services to assist at discharge as needed  - Refer to Case Management Department for coordinating discharge planning if the patient needs post-hospital services based on physician/advanced practitioner order or complex needs related to functional status, cognitive ability, or social support system  Outcome: Progressing Problem: Labor & Delivery  Goal: Manages discomfort  Description: Assess and monitor for signs and symptoms of discomfort  Assess patient's pain level regularly and per hospital policy  Administer medications as ordered  Support use of nonpharmacological methods to help control pain such as distraction, imagery, relaxation, and application of heat and cold  Collaborate with interdisciplinary team and patient to determine appropriate pain management plan  1  Include patient in decisions related to comfort  2  Offer non-pharmacological pain management interventions  3  Report ineffective pain management to physician  Outcome: Progressing  Goal: Patient vital signs are stable  Description: 1  Assess vital signs - vaginal delivery    Outcome: Progressing

## 2022-12-09 ENCOUNTER — ANESTHESIA EVENT (INPATIENT)
Dept: ANESTHESIOLOGY | Facility: HOSPITAL | Age: 32
End: 2022-12-09

## 2022-12-09 ENCOUNTER — ANESTHESIA (INPATIENT)
Dept: ANESTHESIOLOGY | Facility: HOSPITAL | Age: 32
End: 2022-12-09

## 2022-12-09 LAB
BASE EXCESS BLDCOA CALC-SCNC: -9.8 MMOL/L (ref 3–11)
BASE EXCESS BLDCOV CALC-SCNC: -3.1 MMOL/L (ref 1–9)
GLUCOSE SERPL-MCNC: 101 MG/DL (ref 65–140)
GLUCOSE SERPL-MCNC: 112 MG/DL (ref 65–140)
GLUCOSE SERPL-MCNC: 65 MG/DL (ref 65–140)
GLUCOSE SERPL-MCNC: 65 MG/DL (ref 65–140)
GLUCOSE SERPL-MCNC: 80 MG/DL (ref 65–140)
GLUCOSE SERPL-MCNC: 93 MG/DL (ref 65–140)
HCO3 BLDCOA-SCNC: 22.2 MMOL/L (ref 17.3–27.3)
HCO3 BLDCOV-SCNC: 23.3 MMOL/L (ref 12.2–28.6)
O2 CT VFR BLDCOA CALC: 4.1 ML/DL
OXYHGB MFR BLDCOA: 17.4 %
OXYHGB MFR BLDCOV: 72.1 %
PCO2 BLDCOA: 76.3 MM[HG] (ref 30–60)
PCO2 BLDCOV: 46.2 MM HG (ref 27–43)
PH BLDCOA: 7.08 [PH] (ref 7.23–7.43)
PH BLDCOV: 7.32 [PH] (ref 7.19–7.49)
PO2 BLDCOA: 14.2 MM HG (ref 5–25)
PO2 BLDCOV: 29.2 MM HG (ref 15–45)
RPR SER QL: NORMAL
SAO2 % BLDCOV: 17.7 ML/DL

## 2022-12-09 RX ORDER — LIDOCAINE HYDROCHLORIDE AND EPINEPHRINE 15; 5 MG/ML; UG/ML
INJECTION, SOLUTION EPIDURAL AS NEEDED
Status: DISCONTINUED | OUTPATIENT
Start: 2022-12-09 | End: 2022-12-09 | Stop reason: HOSPADM

## 2022-12-09 RX ORDER — BUTORPHANOL TARTRATE 1 MG/ML
1 INJECTION, SOLUTION INTRAMUSCULAR; INTRAVENOUS ONCE
Status: COMPLETED | OUTPATIENT
Start: 2022-12-09 | End: 2022-12-09

## 2022-12-09 RX ORDER — OXYTOCIN/RINGER'S LACTATE 30/500 ML
250 PLASTIC BAG, INJECTION (ML) INTRAVENOUS CONTINUOUS
Status: ACTIVE | OUTPATIENT
Start: 2022-12-09 | End: 2022-12-09

## 2022-12-09 RX ORDER — CALCIUM CARBONATE 200(500)MG
1000 TABLET,CHEWABLE ORAL 3 TIMES DAILY PRN
Status: DISCONTINUED | OUTPATIENT
Start: 2022-12-09 | End: 2022-12-11 | Stop reason: HOSPADM

## 2022-12-09 RX ORDER — DIAPER,BRIEF,INFANT-TODD,DISP
1 EACH MISCELLANEOUS DAILY PRN
Status: DISCONTINUED | OUTPATIENT
Start: 2022-12-09 | End: 2022-12-11 | Stop reason: HOSPADM

## 2022-12-09 RX ORDER — SENNOSIDES 8.6 MG
1 TABLET ORAL DAILY
Status: DISCONTINUED | OUTPATIENT
Start: 2022-12-10 | End: 2022-12-11 | Stop reason: HOSPADM

## 2022-12-09 RX ORDER — MISOPROSTOL 100 UG/1
25 TABLET ORAL ONCE
Status: DISCONTINUED | OUTPATIENT
Start: 2022-12-09 | End: 2022-12-11 | Stop reason: HOSPADM

## 2022-12-09 RX ORDER — MAGNESIUM HYDROXIDE/ALUMINUM HYDROXICE/SIMETHICONE 120; 1200; 1200 MG/30ML; MG/30ML; MG/30ML
15 SUSPENSION ORAL EVERY 6 HOURS PRN
Status: DISCONTINUED | OUTPATIENT
Start: 2022-12-09 | End: 2022-12-11 | Stop reason: HOSPADM

## 2022-12-09 RX ORDER — IBUPROFEN 600 MG/1
600 TABLET ORAL EVERY 6 HOURS SCHEDULED
Status: DISCONTINUED | OUTPATIENT
Start: 2022-12-09 | End: 2022-12-11 | Stop reason: HOSPADM

## 2022-12-09 RX ORDER — SIMETHICONE 80 MG
80 TABLET,CHEWABLE ORAL 4 TIMES DAILY PRN
Status: DISCONTINUED | OUTPATIENT
Start: 2022-12-09 | End: 2022-12-11 | Stop reason: HOSPADM

## 2022-12-09 RX ORDER — OXYTOCIN/RINGER'S LACTATE 30/500 ML
1-30 PLASTIC BAG, INJECTION (ML) INTRAVENOUS
Status: DISCONTINUED | OUTPATIENT
Start: 2022-12-09 | End: 2022-12-11 | Stop reason: HOSPADM

## 2022-12-09 RX ORDER — DOCUSATE SODIUM 100 MG/1
100 CAPSULE, LIQUID FILLED ORAL 2 TIMES DAILY
Status: DISCONTINUED | OUTPATIENT
Start: 2022-12-09 | End: 2022-12-11 | Stop reason: HOSPADM

## 2022-12-09 RX ORDER — ROPIVACAINE HYDROCHLORIDE 2 MG/ML
INJECTION, SOLUTION EPIDURAL; INFILTRATION; PERINEURAL CONTINUOUS PRN
Status: DISCONTINUED | OUTPATIENT
Start: 2022-12-09 | End: 2022-12-09 | Stop reason: HOSPADM

## 2022-12-09 RX ORDER — PROMETHAZINE HYDROCHLORIDE 25 MG/ML
12.5 INJECTION, SOLUTION INTRAMUSCULAR; INTRAVENOUS ONCE
Status: COMPLETED | OUTPATIENT
Start: 2022-12-09 | End: 2022-12-09

## 2022-12-09 RX ORDER — DIPHENHYDRAMINE HCL 25 MG
25 TABLET ORAL EVERY 6 HOURS PRN
Status: DISCONTINUED | OUTPATIENT
Start: 2022-12-09 | End: 2022-12-11 | Stop reason: HOSPADM

## 2022-12-09 RX ORDER — INSULIN LISPRO 100 [IU]/ML
1-6 INJECTION, SOLUTION INTRAVENOUS; SUBCUTANEOUS
Status: DISCONTINUED | OUTPATIENT
Start: 2022-12-09 | End: 2022-12-10

## 2022-12-09 RX ORDER — ONDANSETRON 2 MG/ML
4 INJECTION INTRAMUSCULAR; INTRAVENOUS EVERY 8 HOURS PRN
Status: DISCONTINUED | OUTPATIENT
Start: 2022-12-09 | End: 2022-12-11 | Stop reason: HOSPADM

## 2022-12-09 RX ORDER — ACETAMINOPHEN 325 MG/1
650 TABLET ORAL EVERY 6 HOURS SCHEDULED
Status: DISCONTINUED | OUTPATIENT
Start: 2022-12-09 | End: 2022-12-11 | Stop reason: HOSPADM

## 2022-12-09 RX ADMIN — INSULIN GLARGINE 17 UNITS: 100 INJECTION, SOLUTION SUBCUTANEOUS at 00:33

## 2022-12-09 RX ADMIN — SODIUM CHLORIDE 125 ML/HR: 0.9 INJECTION, SOLUTION INTRAVENOUS at 14:30

## 2022-12-09 RX ADMIN — SODIUM CHLORIDE 5 MILLION UNITS: 0.9 INJECTION, SOLUTION INTRAVENOUS at 02:44

## 2022-12-09 RX ADMIN — SODIUM CHLORIDE 2.5 MILLION UNITS: 9 INJECTION, SOLUTION INTRAVENOUS at 06:53

## 2022-12-09 RX ADMIN — DOCUSATE SODIUM 100 MG: 100 CAPSULE, LIQUID FILLED ORAL at 18:09

## 2022-12-09 RX ADMIN — LIDOCAINE HYDROCHLORIDE AND EPINEPHRINE 3 ML: 15; 5 INJECTION, SOLUTION EPIDURAL at 08:12

## 2022-12-09 RX ADMIN — SODIUM CHLORIDE 2.5 MILLION UNITS: 9 INJECTION, SOLUTION INTRAVENOUS at 11:11

## 2022-12-09 RX ADMIN — ROPIVACAINE HYDROCHLORIDE 10 ML/HR: 2 INJECTION, SOLUTION EPIDURAL; INFILTRATION at 08:14

## 2022-12-09 RX ADMIN — WITCH HAZEL 1 PAD: 500 SOLUTION RECTAL; TOPICAL at 18:09

## 2022-12-09 RX ADMIN — IBUPROFEN 600 MG: 600 TABLET, FILM COATED ORAL at 23:52

## 2022-12-09 RX ADMIN — ROPIVACAINE HYDROCHLORIDE: 2 INJECTION, SOLUTION EPIDURAL; INFILTRATION at 08:21

## 2022-12-09 RX ADMIN — HYDROCORTISONE 1 APPLICATION: 1 CREAM TOPICAL at 18:08

## 2022-12-09 RX ADMIN — ROPIVACAINE HYDROCHLORIDE: 2 INJECTION, SOLUTION EPIDURAL; INFILTRATION at 14:25

## 2022-12-09 RX ADMIN — SODIUM CHLORIDE 125 ML/HR: 0.9 INJECTION, SOLUTION INTRAVENOUS at 10:04

## 2022-12-09 RX ADMIN — Medication 2 MILLI-UNITS/MIN: at 11:40

## 2022-12-09 RX ADMIN — Medication 1 APPLICATION: at 18:09

## 2022-12-09 RX ADMIN — METFORMIN HYDROCHLORIDE 2000 MG: 500 TABLET ORAL at 21:00

## 2022-12-09 RX ADMIN — SODIUM CHLORIDE 125 ML/HR: 0.9 INJECTION, SOLUTION INTRAVENOUS at 11:08

## 2022-12-09 RX ADMIN — LIDOCAINE HYDROCHLORIDE AND EPINEPHRINE 2 ML: 15; 5 INJECTION, SOLUTION EPIDURAL at 08:14

## 2022-12-09 RX ADMIN — ACETAMINOPHEN 650 MG: 325 TABLET ORAL at 20:59

## 2022-12-09 RX ADMIN — SODIUM CHLORIDE 125 ML/HR: 0.9 INJECTION, SOLUTION INTRAVENOUS at 06:53

## 2022-12-09 RX ADMIN — SODIUM CHLORIDE 2.5 MILLION UNITS: 9 INJECTION, SOLUTION INTRAVENOUS at 14:27

## 2022-12-09 RX ADMIN — SODIUM CHLORIDE 125 ML/HR: 0.9 INJECTION, SOLUTION INTRAVENOUS at 02:44

## 2022-12-09 RX ADMIN — IBUPROFEN 600 MG: 600 TABLET, FILM COATED ORAL at 18:09

## 2022-12-09 RX ADMIN — BUTORPHANOL TARTRATE 1 MG: 1 INJECTION, SOLUTION INTRAMUSCULAR; INTRAVENOUS at 07:09

## 2022-12-09 RX ADMIN — PROMETHAZINE HYDROCHLORIDE 12.5 MG: 25 INJECTION INTRAMUSCULAR; INTRAVENOUS at 07:12

## 2022-12-09 NOTE — ANESTHESIA PREPROCEDURE EVALUATION
Procedure:  LABOR ANALGESIA    Relevant Problems   CARDIO   (+) Gestational hypertension   (+) Hypertriglyceridemia      ENDO   (+) Pre-existing type 2 diabetes mellitus with hyperglycemia during pregnancy in third trimester ([facility] [address] )      PULMONARY   (+) Mild intermittent asthma without complication        Physical Exam    Airway    Mallampati score: II  TM Distance: >3 FB  Neck ROM: full     Dental   No notable dental hx     Cardiovascular      Pulmonary      Other Findings         Latest Reference Range & Units 12/08/22 15:21   Sodium 135 - 147 mmol/L 137   Potassium 3 5 - 5 3 mmol/L 3 4 (L)   Chloride 96 - 108 mmol/L 106   CO2 21 - 32 mmol/L 21   Anion Gap 4 - 13 mmol/L 10   BUN 5 - 25 mg/dL 5   Creatinine 0 60 - 1 30 mg/dL 0 43 (L)   Glucose, Random 65 - 140 mg/dL 105   Calcium 8 4 - 10 2 mg/dL 8 9   CORRECTED CALCIUM 8 3 - 10 1 mg/dL 9 5   AST 13 - 39 U/L 15   ALT 7 - 52 U/L 9   Alkaline Phosphatase 34 - 104 U/L 247 (H)   Total Protein 6 4 - 8 4 g/dL 6 3 (L)   Albumin 3 5 - 5 0 g/dL 3 2 (L)   TOTAL BILIRUBIN 0 20 - 1 00 mg/dL 0 56   eGFR ml/min/1 73sq m 136   (L): Data is abnormally low  (H): Data is abnormally high         Latest Reference Range & Units 12/08/22 15:21   WBC 4 31 - 10 16 Thousand/uL 15 12 (H)   Red Blood Cell Count 3 81 - 5 12 Million/uL 4 49   Hemoglobin 11 5 - 15 4 g/dL 13 7   HCT 34 8 - 46 1 % 39 6   MCV 82 - 98 fL 88   MCH 26 8 - 34 3 pg 30 5   MCHC 31 4 - 37 4 g/dL 34 6   RDW 11 6 - 15 1 % 12 5   Platelet Count 727 - 390 Thousands/uL 302   MPV 8 9 - 12 7 fL 9 7   nRBC /100 WBCs 0   (H): Data is abnormally high    Anesthesia Plan  ASA Score- 2 Emergent    Anesthesia Type- epidural with ASA Monitors  Additional Monitors:   Airway Plan:     Comment: clears  Plan Factors-Exercise tolerance (METS): >4 METS  Chart reviewed  Patient summary reviewed  Patient is not a current smoker           Induction-     Postoperative Plan-     Informed Consent- Anesthetic plan and risks discussed with patient  I personally reviewed this patient with the CRNA  Discussed and agreed on the Anesthesia Plan with the CRNA  Libia Keith

## 2022-12-09 NOTE — OB LABOR/OXYTOCIN SAFETY PROGRESS
Labor Progress Note - Ashleigh Bey 29 y o  female MRN: [id number]    Unit/Bed#:  TRIAGE 4-01 Encounter: [id number]       Contraction Frequency (minutes): occasional  Contraction Quality: Mild  Tachysystole: No   Cervical Dilation: 2-3        Cervical Effacement: 50  Fetal Station: -3  Baseline Rate: 135 bpm  Fetal Heart Rate: 133 BPM  FHR Category: Category I               Vital Signs:   Vitals:    12/08/22 2247   BP: 125/64   Pulse: 90   Resp:    Temp:        Notes/comments:   Patient SROM for clear at 0435  SVE as above  Tracing category 1        Eileen Rasmussen MD 12/9/2022 4:50 AM

## 2022-12-09 NOTE — OB LABOR/OXYTOCIN SAFETY PROGRESS
Labor Progress Note - Ashleigh Bey 29 y o  female MRN: [id number]    Unit/Bed#: -01 Encounter: [id number]       Contraction Frequency (minutes): 2-3  Contraction Quality: Moderate  Tachysystole: No   Cervical Dilation: 6        Cervical Effacement: 80  Fetal Station: -1  Baseline Rate: 150 bpm  Fetal Heart Rate: 150 BPM  FHR Category: Category II             Vital Signs:   Vitals:    12/09/22 0915   BP: 117/70   Pulse:    Resp:    Temp:    SpO2:      Notes/comments:   SVE as above  FHT responsive to scalp stimulation   Fluid bolus given at this time  D/w Dr Elisha Lopez DO 12/9/2022 9:37 AM

## 2022-12-09 NOTE — ANESTHESIA POSTPROCEDURE EVALUATION
Post-Op Assessment Note    CV Status:  Stable  Pain Score: 0    Pain management: adequate     Mental Status:  Alert and awake   Hydration Status:  Stable   PONV Controlled:  Controlled   Airway Patency:  Patent      Post Op Vitals Reviewed: Yes      Staff: CRNA   Comments: Epidural catheter removed without difficulty, tip intact, site clean    Post-op block assessment: catheter intact and no complications      No notable events documented      BP      Temp      Pulse     Resp      SpO2

## 2022-12-09 NOTE — OB LABOR/OXYTOCIN SAFETY PROGRESS
Labor Progress Note - Cayden Bey 29 y o  female MRN: [id number]    Unit/Bed#: -01 Encounter: [id number]       Contraction Frequency (minutes): difficult to trace, pt on her side  Contraction Quality: Moderate  Tachysystole: No   Cervical Dilation: 6        Cervical Effacement: 80  Fetal Station: -1  Baseline Rate: 135 bpm  Fetal Heart Rate: 138 BPM  FHR Category: Category I             Vital Signs:   Vitals:    12/09/22 0741   BP: 124/75   Pulse: 82   Resp:    Temp:        Notes/comments:   SVE as above  Christy Montejo is feeling more uncomfortable with her contractions    Anesthesia contacted to place epidural  FHT Cat 1  D/w Dr Yesi Davies, DO 12/9/2022 7:47 AM

## 2022-12-09 NOTE — OB LABOR/OXYTOCIN SAFETY PROGRESS
Oxytocin Safety Progress Check Note - Tana Bey 29 y o  female MRN: [id number]    Unit/Bed#: -01 Encounter: [id number]    Dose (eran-units/min) Oxytocin: 6 eran-units/min  Contraction Frequency (minutes): 2-3  Contraction Quality: Moderate  Tachysystole: No   Cervical Dilation: 8        Cervical Effacement: 80  Fetal Station: 1  Baseline Rate: 150 bpm  Fetal Heart Rate: 150 BPM  FHR Category: Category I               Vital Signs:   Vitals:    12/09/22 1345   BP: 138/80   Pulse:    Resp:    Temp:    SpO2:        Notes/comments:   SVE 8/80/+1  FHT category 1, reactive with scalp stim  Pitocin 6 mU/min, toco q2-3m  Continue titration as tolerated          Kristina Batista MD 12/9/2022 1:59 PM

## 2022-12-09 NOTE — OB LABOR/OXYTOCIN SAFETY PROGRESS
Oxytocin Safety Progress Check Note - Located within [facility] Maida 29 y o  female MRN: [id number]    Unit/Bed#: -01 Encounter: [id number]       Contraction Frequency (minutes): 2-3  Contraction Quality: Moderate  Tachysystole: No   Cervical Dilation: 6        Cervical Effacement: 80  Fetal Station: -1  Baseline Rate: 150 bpm  Fetal Heart Rate: 150 BPM  FHR Category: Category II               Vital Signs:   Vitals:    12/09/22 1053   BP: 127/88   Pulse: 97   Resp:    Temp:    SpO2:        Notes/comments: SVE as above  Periods of minimal variability  Patient has been sleeping  Receiving fluid bolus  Good reactivity with my exam  Continue to monitor closely   D/w Dr Eryn Suggs MD 12/9/2022 10:54 AM

## 2022-12-09 NOTE — L&D DELIVERY NOTE
DELIVERY NOTE  Keisha Bey 29 y o  female MRN: [id number]  Unit/Bed#: -01 Encounter: [id number]    Obstetrician:    Dr Sindy Hickman MD    Assistant:   Dr Randa Minor    Pre-Delivery Diagnosis:   IUP @37w5d  GHTN  Rh positive  GBS positive    Post-Delivery Diagnosis:   Same as above - Delivered  Viable female fetus  R periurethral laceration, not requiring repair    Procedure:  Spontaneous vaginal delivery    Anesthesia:  epidural    Specimens:   Cord blood obtained   Placenta; normal appearing, central insertion, intact   Arterial and venous blood gases (below)     Gases:  Umbilical Cord Venous Blood Gas:  Results from last 7 days   Lab Units 22  1713   PH COV  7 320   PCO2 COV mm HG 46 2*   HCO3 COV mmol/L 23 3   BASE EXC COV mmol/L -3 1*   O2 CT CD VB mL/dL 17 7   O2 HGB, VENOUS CORD % 07 4     Umbilical Cord Arterial Blood Gas:  Results from last 7 days   Lab Units 22  1713   PH COA  7 081*   PCO2 COA  76 3*   PO2 COA mm HG 14 2   HCO3 COA mmol/L 22 2   BASE EXC COA mmol/L -9 8*   O2 CONTENT CORD ART ml/dl 4 1   O2 HGB, ARTERIAL CORD % 17 4       Quantitative Blood Loss:   76 mL           Complications:    None apparent    Brief Description of Labor Course:  Elsa Gil is a 29 y o  G 1 P 0 female at 40 w 5 d who was admitted to L&D for G HTN  Her initial cervical exam was 4   She was given p o  Cytotec  she had spontaneous rupture of membranes at 0435 hours  She received an epidural for analgesia  She was then started on Pitocin  She progressed to complete at 1605, pushed for 52 min, and delivered a healthy  at 56  Description of Delivery:   With  the assistance of maternal expulsive forces, the fetal vertex delivered spontaneously  A nuchal cord was not noted  The anterior left shoulder was delivered atraumatically with gentle downward traction  The contralateral arm was delivered with gentle upward traction   The remainder of the fetus delivered spontaneously at 56, resulting in a viable female   Upon delivery, the infant was placed on the mothers abdomen and the cord was doubly clamped and cut after 30 seconds  The  was noted to have good tone and cry spontaneously  There was no evidence of injury  The  was examined by nursing at at warmer  Umbilical cord blood and umbilical artery and venous gases were collected and sent to the lab  An intact placenta was delivered spontaneously at 1713 using fundal massage and gentle cord traction and was noted to have a centrally-inserted 3-vessel cord  Active management of the third stage of labor was undertaken with IV pitocin at 250milliunits/min   Outcome:  Living  with APGARS 7 (1 min) and 9 (5 min)   weight: pending    Inspection of the perineum, vagina, labia, cervix, and urethra revealed a periurethral laceration that did not require repair  Bleeding was noted to be under control with manual pressure        At the conclusion of the delivery, all needle, sponge, and instrument counts were noted to be correct  Patient tolerated the procedure well and was allowed to recover in labor and delivery room with family and  before being transferred to the post-partum floor  Conclusion:  Mother and baby are currently recovering nicely in stable condition  Attending Supervision:   Dr Huong Hernandez MD was present for the entire procedure      Justin Espinosa DO  OB/GYN PGY-1   2022 5:34 PM

## 2022-12-09 NOTE — ANESTHESIA PROCEDURE NOTES
Epidural Block    Patient location during procedure: OB  Start time: 12/9/2022 8:07 AM  Staffing  Performed: CRNA   Anesthesiologist: Sera Burrell MD  Resident/CRNA: Selene Harada Younger, CRNA  Preanesthetic Checklist  Completed: patient identified, IV checked, site marked, risks and benefits discussed, surgical consent, monitors and equipment checked, pre-op evaluation and timeout performed  Epidural  Patient position: sitting  Prep: ChloraPrep  Patient monitoring: frequent blood pressure checks, continuous pulse ox and heart rate  Approach: midline  Location: lumbar  Injection technique: HUA saline  Needle  Needle type: Tuohy   Needle gauge: 18 G  Catheter type: end hole  Catheter at skin depth: 13 cm  Catheter securement method: stabilization device  Test dose: negative  Assessment  Sensory level: T10  Number of attempts: 1negative aspiration for CSF, negative aspiration for heme and no paresthesia on injection  patient tolerated the procedure well with no immediate complications

## 2022-12-09 NOTE — PLAN OF CARE
Problem: PAIN - ADULT  Goal: Verbalizes/displays adequate comfort level or baseline comfort level  Description: Interventions:  - Encourage patient to monitor pain and request assistance  - Assess pain using appropriate pain scale  - Administer analgesics based on type and severity of pain and evaluate response  - Implement non-pharmacological measures as appropriate and evaluate response  - Consider cultural and social influences on pain and pain management  - Notify physician/advanced practitioner if interventions unsuccessful or patient reports new pain  Outcome: Progressing     Problem: INFECTION - ADULT  Goal: Absence or prevention of progression during hospitalization  Description: INTERVENTIONS:  - Assess and monitor for signs and symptoms of infection  - Monitor lab/diagnostic results  - Monitor all insertion sites, i e  indwelling lines, tubes, and drains  Problem: SAFETY ADULT    Goal: Patient will remain free of falls  Description: INTERVENTIONS:  - Educate patient/family on patient safety including physical limitations  - Instruct patient to call for assistance with activity   - Consult OT/PT to assist with strengthening/mobility   - Keep Call bell within reach  - Keep bed low and locked with side rails adjusted as appropriate  - Keep care items and personal belongings within reach  - Initiate and maintain comfort rounds  Outcome: Progressing  Goal: Maintain or return to baseline ADL function  Description: INTERVENTIONS:  -  Assess patient's ability to carry out ADLs; assess patient's baseline for ADL function and identify physical deficits which impact ability to perform ADLs (bathing, care of mouth/teeth, toileting, grooming, dressing, etc )  - Assess/evaluate cause of self-care deficits   - Assess range of motion  - Assess patient's mobility; develop plan if impaired  - Assess patient's need for assistive devices and provide as appropriate  - Encourage maximum independence but intervene and supervise when necessary  - Involve family in performance of ADLs  - Assess for home care needs following discharge   - Consider OT consult to assist with ADL evaluation and planning for discharge  - Provide patient education as appropriate  Outcome: Progressing  Goal: Maintains/Returns to pre admission functional level  Description: INTERVENTIONS:  - Perform BMAT or MOVE assessment daily    - Set and communicate daily mobility goal to care team and patient/family/caregiver  - Collaborate with rehabilitation services on mobility goals if consulted  - Out of bed for toileting  - Record patient progress and toleration of activity level   Outcome: Progressing     Problem: Knowledge Deficit  Goal: Patient/family/caregiver demonstrates understanding of disease process, treatment plan, medications, and discharge instructions  Description: Complete learning assessment and assess knowledge base  Interventions:  - Provide teaching at level of understanding  - Provide teaching via preferred learning methods  Outcome: Progressing  Goal: Verbalizes understanding of labor plan  Description: Assess patient/family/caregiver's baseline knowledge level and ability to understand information  Provide education via patient/family/caregiver's preferred learning method at appropriate level of understanding  1  Provide teaching at level of understanding  2  Provide teaching via preferred learning method(s)    Outcome: Progressing     Problem: DISCHARGE PLANNING  Goal: Discharge to home or other facility with appropriate resources  Description: INTERVENTIONS:  - Identify barriers to discharge w/patient and caregiver  - Arrange for needed discharge resources and transportation as appropriate  - Identify discharge learning needs (meds, wound care, etc )  - Arrange for interpretive services to assist at discharge as needed  - Refer to Case Management Department for coordinating discharge planning if the patient needs post-hospital services based on physician/advanced practitioner order or complex needs related to functional status, cognitive ability, or social support system  Outcome: Progressing     Problem: Labor & Delivery  Goal: Manages discomfort  Description: Assess and monitor for signs and symptoms of discomfort  Assess patient's pain level regularly and per hospital policy  Administer medications as ordered  Support use of nonpharmacological methods to help control pain such as distraction, imagery, relaxation, and application of heat and cold  Collaborate with interdisciplinary team and patient to determine appropriate pain management plan  1  Include patient in decisions related to comfort  2  Offer non-pharmacological pain management interventions  3  Report ineffective pain management to physician  Outcome: Progressing  Goal: Patient vital signs are stable  Description: 1  Assess vital signs - vaginal delivery    Outcome: Progressing  Goal: Absence of fever/infection during neutropenic period  Description: INTERVENTIONS:  - Monitor WBC    Outcome: Progressing

## 2022-12-09 NOTE — QUICK NOTE
Vitals:    12/09/22 1054   BP:    Pulse:    Resp:    Temp: 98 3 °F (36 8 °C)   SpO2:      Met with patient at bedside to discuss labor augmentation  Reviewed risk and benefits of management with Pitocin for the indication of protracted dilation  Discussed risk of tachysystole and possible fetal heart rate decelerations appreciated on monitor, and resultant management for fetal resuscitation  Patient consented for labor augmentation  Pit bundle completed and low-dose Pitocin protocol initiated      Alana Logan  11:25 AM

## 2022-12-10 LAB
GLUCOSE SERPL-MCNC: 100 MG/DL (ref 65–140)
GLUCOSE SERPL-MCNC: 133 MG/DL (ref 65–140)
GLUCOSE SERPL-MCNC: 71 MG/DL (ref 65–140)
GLUCOSE SERPL-MCNC: 72 MG/DL (ref 65–140)

## 2022-12-10 RX ORDER — INSULIN LISPRO 100 [IU]/ML
1-6 INJECTION, SOLUTION INTRAVENOUS; SUBCUTANEOUS
Status: DISCONTINUED | OUTPATIENT
Start: 2022-12-10 | End: 2022-12-11 | Stop reason: HOSPADM

## 2022-12-10 RX ADMIN — IBUPROFEN 600 MG: 600 TABLET, FILM COATED ORAL at 21:54

## 2022-12-10 RX ADMIN — DOCUSATE SODIUM 100 MG: 100 CAPSULE, LIQUID FILLED ORAL at 08:16

## 2022-12-10 RX ADMIN — IBUPROFEN 600 MG: 600 TABLET, FILM COATED ORAL at 14:45

## 2022-12-10 RX ADMIN — ACETAMINOPHEN 650 MG: 325 TABLET ORAL at 04:37

## 2022-12-10 RX ADMIN — ACETAMINOPHEN 650 MG: 325 TABLET ORAL at 17:25

## 2022-12-10 RX ADMIN — IBUPROFEN 600 MG: 600 TABLET, FILM COATED ORAL at 06:21

## 2022-12-10 RX ADMIN — ACETAMINOPHEN 650 MG: 325 TABLET ORAL at 08:16

## 2022-12-10 RX ADMIN — DOCUSATE SODIUM 100 MG: 100 CAPSULE, LIQUID FILLED ORAL at 17:25

## 2022-12-10 NOTE — ASSESSMENT & PLAN NOTE
Lochia WNL   Recovering well   Appropriate bowel and bladder function   Pain well controlled   Tolerating diet   Breastfeeding  Ambulating without issues   No lower extremity tenderness  GBS positive, s/p adequate PCN  Rh positive

## 2022-12-10 NOTE — PROGRESS NOTES
Progress Note - OB/GYN  Toribio Peabody Reitenauer 29 y o  female MRN: [id number]  Unit/Bed#: -01 Encounter: [id number]    Assessment and [address] is a patient of: Manjit Hernandez  She is PPD# 1 s/p  spontaneous vaginal delivery  Recovering well and is stable       Pre-existing type 2 diabetes mellitus with hyperglycemia during pregnancy in third trimester [facility])  Assessment & Plan    Lab Results   Component Value Date    HGBA1C 5 3 2022     On insulin - managed on    - 4u breakfast, 6u Humalog with lunch and dinner   - 0 34 u Semglee nightly   - Metformin 2000mg daily      Pt reports that she was on   - Semglee nightly (uncertain of exact dose) and 2000mg daily prior to pregnancy    Gestational hypertension  Assessment & Plan  Systolic (69TPZ), GONSALEZ:175 , Min:100 , TGH:453   Diastolic (72VAY), AIQ:64, Min:55, Max:88    Denies headache, vision changes, chest pain, shortness of breath, RUQ pain      *  (spontaneous vaginal delivery)  Assessment & Plan  Lochia WNL   Recovering well   Appropriate bowel and bladder function   Pain well controlled   Tolerating diet   Breastfeeding  Ambulating without issues   No lower extremity tenderness  GBS positive, s/p adequate PCN  Rh positive      Disposition    - Anticipate discharge home on PPD# 2      Subjective/Objective     Chief Complaint: Postpartum State     Subjective:    [place] is PPD/POD#1 s/p  spontaneous vaginal delivery  She has no current complaints  Pain is well controlled  Patient is currently voiding  She is ambulating  Patient is currently passing flatus and has had no bowel movement  She is tolerating PO, and denies nausea or vomitting  Patient denies fever, chills, chest pain, shortness of breath, or calf tenderness  Lochia is normal  She is  Breastfeeding  She is recovering well and is stable         Vitals:   /56 (BP Location: Right arm)   Pulse 89   Temp 97 8 °F (36 6 °C) (Oral)   Resp 16   Ht 5' 7" (1 702 m)   Wt 98 kg (216 lb)   LMP 03/10/2022   SpO2 99%   Breastfeeding Yes   BMI 33 83 kg/m²       Intake/Output Summary (Last 24 hours) at 12/10/2022 5860  Last data filed at 12/9/2022 2352  Gross per 24 hour   Intake 2299 78 ml   Output 1176 ml   Net 1123 78 ml       Invasive Devices     Peripheral Intravenous Line  Duration           Peripheral IV 12/08/22 Left Hand 1 day                Physical Exam:   GEN: Tom Wilkins appears well, alert and oriented x 3, pleasant and cooperative   CARDIO: RRR, no murmurs or rubs  RESP:  CTAB, no wheezes or rales  ABDOMEN: soft, no tenderness, no distention, fundus @ umbilicus,   EXTREMITIES: SCDs on, non tender, no erythema      Labs:     Hemoglobin   Date Value Ref Range Status   12/08/2022 13 7 11 5 - 15 4 g/dL Final   11/29/2022 12 7 11 5 - 15 4 g/dL Final     WBC   Date Value Ref Range Status   12/08/2022 15 12 (H) 4 31 - 10 16 Thousand/uL Final   11/29/2022 11 82 (H) 4 31 - 10 16 Thousand/uL Final     Platelets   Date Value Ref Range Status   12/08/2022 302 149 - 390 Thousands/uL Final   11/29/2022 272 149 - 390 Thousands/uL Final     Creatinine   Date Value Ref Range Status   12/08/2022 0 43 (L) 0 60 - 1 30 mg/dL Final     Comment:     Standardized to IDMS reference method   11/29/2022 0 50 (L) 0 60 - 1 30 mg/dL Final     Comment:     Standardized to IDMS reference method     AST   Date Value Ref Range Status   12/08/2022 15 13 - 39 U/L Final     Comment:     Specimen collection should occur prior to Sulfasalazine administration due to the potential for falsely depressed results  11/29/2022 21 5 - 45 U/L Final     Comment:     Specimen collection should occur prior to Sulfasalazine administration due to the potential for falsely depressed results        ALT   Date Value Ref Range Status   12/08/2022 9 7 - 52 U/L Final     Comment:     Specimen collection should occur prior to Sulfasalazine administration due to the potential for falsely depressed results  11/29/2022 20 12 - 78 U/L Final     Comment:     Specimen collection should occur prior to Sulfasalazine and/or Sulfapyridine administration due to the potential for falsely depressed results             Laura Crawford DO  12/10/2022  7:22 AM

## 2022-12-10 NOTE — PLAN OF CARE
Problem: PAIN - ADULT  Goal: Verbalizes/displays adequate comfort level or baseline comfort level  Description: Interventions:  - Encourage patient to monitor pain and request assistance  - Assess pain using appropriate pain scale  - Administer analgesics based on type and severity of pain and evaluate response  - Implement non-pharmacological measures as appropriate and evaluate response  - Consider cultural and social influences on pain and pain management  - Notify physician/advanced practitioner if interventions unsuccessful or patient reports new pain  Outcome: Progressing     Problem: INFECTION - ADULT  Goal: Absence or prevention of progression during hospitalization  Description: INTERVENTIONS:  - Assess and monitor for signs and symptoms of infection  - Monitor lab/diagnostic results  - Monitor all insertion sites, i e  indwelling lines, tubes, and drains  - Monitor endotracheal if appropriate and nasal secretions for changes in amount and color  - [place] appropriate cooling/warming therapies per order  - Administer medications as ordered  - Instruct and encourage patient and family to use good hand hygiene technique  - Identify and instruct in appropriate isolation precautions for identified infection/condition  Outcome: Progressing     Problem: SAFETY ADULT  Goal: Patient will remain free of falls  Description: INTERVENTIONS:  - Educate patient/family on patient safety including physical limitations  - Instruct patient to call for assistance with activity   - Consult OT/PT to assist with strengthening/mobility   - Keep Call bell within reach  - Keep bed low and locked with side rails adjusted as appropriate  - Keep care items and personal belongings within reach  - Initiate and maintain comfort rounds  - Make Fall Risk Sign visible to staff  - Apply yellow socks and bracelet for high fall risk patients  - Consider moving patient to room near nurses station  Outcome: Progressing  Goal: Maintain or return to baseline ADL function  Description: INTERVENTIONS:  -  Assess patient's ability to carry out ADLs; assess patient's baseline for ADL function and identify physical deficits which impact ability to perform ADLs (bathing, care of mouth/teeth, toileting, grooming, dressing, etc )  - Assess/evaluate cause of self-care deficits   - Assess range of motion  - Assess patient's mobility; develop plan if impaired  - Assess patient's need for assistive devices and provide as appropriate  - Encourage maximum independence but intervene and supervise when necessary  - Involve family in performance of ADLs  - Assess for home care needs following discharge   - Consider OT consult to assist with ADL evaluation and planning for discharge  - Provide patient education as appropriate  Outcome: Progressing  Goal: Maintains/Returns to pre admission functional level  Description: INTERVENTIONS:  - Perform BMAT or MOVE assessment daily    - Set and communicate daily mobility goal to care team and patient/family/caregiver     - Collaborate with rehabilitation services on mobility goals if consulted  - Out of bed for toileting  - Record patient progress and toleration of activity level   Outcome: Progressing     Problem: DISCHARGE PLANNING  Goal: Discharge to home or other facility with appropriate resources  Description: INTERVENTIONS:  - Identify barriers to discharge w/patient and caregiver  - Arrange for needed discharge resources and transportation as appropriate  - Identify discharge learning needs (meds, wound care, etc )  - Arrange for interpretive services to assist at discharge as needed  - Refer to Case Management Department for coordinating discharge planning if the patient needs post-hospital services based on physician/advanced practitioner order or complex needs related to functional status, cognitive ability, or social support system  Outcome: Progressing     Problem: Potential for Falls  Goal: Patient will remain free of falls  Description: INTERVENTIONS:  - Educate patient/family on patient safety including physical limitations  - Instruct patient to call for assistance with activity   - Consult OT/PT to assist with strengthening/mobility   - Keep Call bell within reach  - Keep bed low and locked with side rails adjusted as appropriate  - Keep care items and personal belongings within reach  - Initiate and maintain comfort rounds  - Make Fall Risk Sign visible to staff  - Apply yellow socks and bracelet for high fall risk patients  - Consider moving patient to room near nurses station  Outcome: Progressing     Problem: POSTPARTUM  Goal: Experiences normal postpartum course  Description: INTERVENTIONS:  - Monitor maternal vital signs  - Assess uterine involution and lochia  Outcome: Progressing  Goal: Appropriate maternal -  bonding  Description: INTERVENTIONS:  - Identify family support  - Assess for appropriate maternal/infant bonding   -Encourage maternal/infant bonding opportunities  - Referral to  or  as needed  Outcome: Progressing  Goal: Establishment of infant feeding pattern  Description: INTERVENTIONS:  - Assess breast/bottle feeding  - Refer to lactation as needed  Outcome: Progressing  Goal: Incision(s), wounds(s) or drain site(s) healing without S/S of infection  Description: INTERVENTIONS  - Assess and document dressing, incision, wound bed, drain sites and surrounding tissue  - Provide patient and family education  Outcome: Progressing     Problem: INFECTION - ADULT  Goal: Absence of fever/infection during neutropenic period  Description: INTERVENTIONS:  - Monitor WBC    Outcome: Completed     Problem: Knowledge Deficit  Goal: Patient/family/caregiver demonstrates understanding of disease process, treatment plan, medications, and discharge instructions  Description: Complete learning assessment and assess knowledge base    Interventions:  - Provide teaching at level of understanding  - Provide teaching via preferred learning methods  Outcome: Completed  Goal: Verbalizes understanding of labor plan  Description: Assess patient/family/caregiver's baseline knowledge level and ability to understand information  Provide education via patient/family/caregiver's preferred learning method at appropriate level of understanding  1  Provide teaching at level of understanding  2  Provide teaching via preferred learning method(s)  Outcome: Completed     Problem: Labor & Delivery  Goal: Manages discomfort  Description: Assess and monitor for signs and symptoms of discomfort  Assess patient's pain level regularly and per hospital policy  Administer medications as ordered  Support use of nonpharmacological methods to help control pain such as distraction, imagery, relaxation, and application of heat and cold  Collaborate with interdisciplinary team and patient to determine appropriate pain management plan  1  Include patient in decisions related to comfort  2  Offer non-pharmacological pain management interventions  3  Report ineffective pain management to physician  Outcome: Completed  Goal: Patient vital signs are stable  Description: 1  Assess vital signs - vaginal delivery    Outcome: Completed

## 2022-12-10 NOTE — DISCHARGE SUMMARY
Discharge Summary - OB/GYN   Kassie Bey 29 y o  female MRN: [id number]  Unit/Bed#: -01 Encounter: [id number]      Admission Date: 2022     Discharge Date: 2022    Admitting Diagnosis:   1  Pregnancy at 37w6d  2  GHTN  3  T2DM    Discharge Diagnosis:   Same, delivered      Procedures: spontaneous vaginal delivery    Delivering Attending: Clara Vaughan MD  Discharge Attending: [facility] Course:     Jeanne Moore is a 29 y o  G 1 P 0 female at 40 w 5 d who was admitted to L&D for G HTN  Her initial cervical exam was 4   She was GBS positive and received penicillin throughout her labor course  she was given p o  Cytotec  she had spontaneous rupture of membranes at 0435 hours  She received an epidural for analgesia  She was then started on Pitocin  She progressed to complete at 1605, pushed for 52 min, and delivered a healthy  at 56  She delivered a viable female  on 22 at 56  Weight 7lbs 3oz via spontaneous vaginal delivery  Apgars were 7 (1 min) and 9 (5 min)   was transferred to  nursery  Patient tolerated the procedure well and was transferred to recovery in stable condition  Her post-partum course was complicated by Y0IB  She was managed on a sliding scale of insulin while inpatient  During pregnancy she was managed on glargine 34 units, Humalog 4-6-6 before meals, metformin 2000 mg  Her prepregnancy regimen was 10 units Levemir nightly and 2000 mg metformin daily  She had no severe range blood pressures prior to discharge  Her post-partum pain was well controlled with oral analgesics  On day of discharge, she was ambulating and able to reasonably perform all ADLs  She was voiding and had appropriate bowel function  Pain was well controlled  She was discharged home on post-partum day #2 without complications   Patient was instructed to follow up with her OB as an outpatient and was given appropriate warnings to call provider if she develops signs of infection or uncontrolled pain  Complications: none apparent    Condition at discharge: good     Discharge instructions/Information to patient and family:   See after visit summary for information provided to patient and family  Provisions for Follow-Up Care:  See after visit summary for information related to follow-up care and any pertinent home health orders  Disposition: See After Visit Summary for discharge disposition information  Planned Readmission: No    Discharge Medications: For a complete list of the patient's medications, please refer to her med rec

## 2022-12-11 VITALS
SYSTOLIC BLOOD PRESSURE: 128 MMHG | HEART RATE: 90 BPM | TEMPERATURE: 97.7 F | BODY MASS INDEX: 33.9 KG/M2 | OXYGEN SATURATION: 98 % | WEIGHT: 216 LBS | RESPIRATION RATE: 18 BRPM | HEIGHT: 67 IN | DIASTOLIC BLOOD PRESSURE: 70 MMHG

## 2022-12-11 LAB — GLUCOSE SERPL-MCNC: 66 MG/DL (ref 65–140)

## 2022-12-11 RX ORDER — IBUPROFEN 600 MG/1
600 TABLET ORAL EVERY 6 HOURS SCHEDULED
Qty: 30 TABLET | Refills: 0 | Status: SHIPPED | OUTPATIENT
Start: 2022-12-11

## 2022-12-11 RX ORDER — ACETAMINOPHEN 325 MG/1
650 TABLET ORAL EVERY 6 HOURS SCHEDULED
Qty: 30 TABLET | Refills: 0 | Status: SHIPPED | OUTPATIENT
Start: 2022-12-11

## 2022-12-11 RX ADMIN — IBUPROFEN 600 MG: 600 TABLET, FILM COATED ORAL at 12:21

## 2022-12-11 RX ADMIN — DOCUSATE SODIUM 100 MG: 100 CAPSULE, LIQUID FILLED ORAL at 08:57

## 2022-12-11 RX ADMIN — ACETAMINOPHEN 650 MG: 325 TABLET ORAL at 08:57

## 2022-12-11 NOTE — PLAN OF CARE
Problem: PAIN - ADULT  Goal: Verbalizes/displays adequate comfort level or baseline comfort level  Description: Interventions:  - Encourage patient to monitor pain and request assistance  - Assess pain using appropriate pain scale  - Administer analgesics based on type and severity of pain and evaluate response  - Implement non-pharmacological measures as appropriate and evaluate response  - Consider cultural and social influences on pain and pain management  - Notify physician/advanced practitioner if interventions unsuccessful or patient reports new pain  Outcome: Progressing     Problem: INFECTION - ADULT  Goal: Absence or prevention of progression during hospitalization  Description: INTERVENTIONS:  - Assess and monitor for signs and symptoms of infection  - Monitor lab/diagnostic results  - Monitor all insertion sites, i e  indwelling lines, tubes, and drains  - Monitor endotracheal if appropriate and nasal secretions for changes in amount and color  - [place] appropriate cooling/warming therapies per order  - Administer medications as ordered  - Instruct and encourage patient and family to use good hand hygiene technique  - Identify and instruct in appropriate isolation precautions for identified infection/condition  Outcome: Progressing     Problem: SAFETY ADULT  Goal: Patient will remain free of falls  Description: INTERVENTIONS:  - Educate patient/family on patient safety including physical limitations  - Instruct patient to call for assistance with activity   - Consult OT/PT to assist with strengthening/mobility   - Keep Call bell within reach  - Keep bed low and locked with side rails adjusted as appropriate  - Keep care items and personal belongings within reach  - Initiate and maintain comfort rounds  - Make Fall Risk Sign visible to staff  - Apply yellow socks and bracelet for high fall risk patients  - Consider moving patient to room near nurses station  Outcome: Progressing  Goal: Maintain or return to baseline ADL function  Description: INTERVENTIONS:  -  Assess patient's ability to carry out ADLs; assess patient's baseline for ADL function and identify physical deficits which impact ability to perform ADLs (bathing, care of mouth/teeth, toileting, grooming, dressing, etc )  - Assess/evaluate cause of self-care deficits   - Assess range of motion  - Assess patient's mobility; develop plan if impaired  - Assess patient's need for assistive devices and provide as appropriate  - Encourage maximum independence but intervene and supervise when necessary  - Involve family in performance of ADLs  - Assess for home care needs following discharge   - Consider OT consult to assist with ADL evaluation and planning for discharge  - Provide patient education as appropriate  Outcome: Progressing  Goal: Maintains/Returns to pre admission functional level  Description: INTERVENTIONS:  - Perform BMAT or MOVE assessment daily    - Set and communicate daily mobility goal to care team and patient/family/caregiver     - Collaborate with rehabilitation services on mobility goals if consulted  - Out of bed for toileting  - Record patient progress and toleration of activity level   Outcome: Progressing     Problem: DISCHARGE PLANNING  Goal: Discharge to home or other facility with appropriate resources  Description: INTERVENTIONS:  - Identify barriers to discharge w/patient and caregiver  - Arrange for needed discharge resources and transportation as appropriate  - Identify discharge learning needs (meds, wound care, etc )  - Arrange for interpretive services to assist at discharge as needed  - Refer to Case Management Department for coordinating discharge planning if the patient needs post-hospital services based on physician/advanced practitioner order or complex needs related to functional status, cognitive ability, or social support system  Outcome: Progressing     Problem: Potential for Falls  Goal: Patient will remain free of falls  Description: INTERVENTIONS:  - Educate patient/family on patient safety including physical limitations  - Instruct patient to call for assistance with activity   - Consult OT/PT to assist with strengthening/mobility   - Keep Call bell within reach  - Keep bed low and locked with side rails adjusted as appropriate  - Keep care items and personal belongings within reach  - Initiate and maintain comfort rounds  - Make Fall Risk Sign visible to staff  - Apply yellow socks and bracelet for high fall risk patients  - Consider moving patient to room near nurses station  Outcome: Progressing     Problem: POSTPARTUM  Goal: Experiences normal postpartum course  Description: INTERVENTIONS:  - Monitor maternal vital signs  - Assess uterine involution and lochia  Outcome: Progressing  Goal: Appropriate maternal -  bonding  Description: INTERVENTIONS:  - Identify family support  - Assess for appropriate maternal/infant bonding   -Encourage maternal/infant bonding opportunities  - Referral to  or  as needed  Outcome: Progressing  Goal: Establishment of infant feeding pattern  Description: INTERVENTIONS:  - Assess breast/bottle feeding  - Refer to lactation as needed  Outcome: Progressing  Goal: Incision(s), wounds(s) or drain site(s) healing without S/S of infection  Description: INTERVENTIONS  - Assess and document dressing, incision, wound bed, drain sites and surrounding tissue  - Provide patient and family education  Outcome: Progressing

## 2022-12-11 NOTE — PROGRESS NOTES
Progress Note - OB/GYN  Ashleigh Bey 29 y o  female MRN: [id number]  Unit/Bed#:  320-01 Encounter: [id number]    Assessment and [address] is a patient of: Manjit Hernandez  She is PPD# 2 s/p  spontaneous vaginal delivery  Recovering well and is stable       Pre-existing type 2 diabetes mellitus with hyperglycemia during pregnancy in third trimester [facility])  Assessment & Plan    Lab Results   Component Value Date    HGBA1C 5 3 2022     Pregnancy regimen:  managed on    - 4u breakfast, 6u Humalog with lunch and dinner   - 0 34 u Semglee nightly   - Metformin 2000mg daily    Pre-pregnancy regimen  10U Levemir qH and 2000mg Metformin daily   Follow up as outpatient    Gestational hypertension  Assessment & Plan  Systolic (28QGX), TDD:820 , Min:109 , VVV:823   Diastolic (96PHC), FZY:52, Min:56, Max:70    Denies headache, vision changes, chest pain, shortness of breath, RUQ pain      *  (spontaneous vaginal delivery)  Assessment & Plan  Lochia WNL   Recovering well   Appropriate bowel and bladder function   Pain well controlled   Tolerating diet   Breastfeeding  Ambulating without issues   No lower extremity tenderness  GBS positive, s/p adequate PCN  Rh positive      Disposition    - Anticipate discharge home on PPD# 2    Subjective/Objective     Chief Complaint: Postpartum State     Subjective:    Sherice Avalos is PPD/POD#2 s/p  spontaneous vaginal delivery  She has no current complaints  Pain is well controlled  Patient is currently voiding  She is ambulating  Patient is currently passing flatus and has had no bowel movement  She is tolerating PO, and denies nausea or vomitting  Patient denies fever, chills, chest pain, shortness of breath, or calf tenderness  Lochia is normal  She is  Breastfeeding  She is recovering well and is stable         Vitals:   /70 (BP Location: Right arm)   Pulse 87   Temp 97 7 °F (36 5 °C) (Oral)   Resp 18   Ht 5' 7" (1 702 m) Wt 98 kg (216 lb)   LMP 03/10/2022   SpO2 98%   Breastfeeding Yes   BMI 33 83 kg/m²     No intake or output data in the 24 hours ending 12/11/22 0637    Invasive Devices     None                 Physical Exam:   GEN: [place] appears well, alert and oriented x 3, pleasant and cooperative   CARDIO: RRR, no murmurs or rubs  RESP:  CTAB, no wheezes or rales  ABDOMEN: soft, no tenderness, no distention, fundus @ u-1  EXTREMITIES: SCDs on, non tender, no erythema    Labs:     Hemoglobin   Date Value Ref Range Status   12/08/2022 13 7 11 5 - 15 4 g/dL Final   11/29/2022 12 7 11 5 - 15 4 g/dL Final     WBC   Date Value Ref Range Status   12/08/2022 15 12 (H) 4 31 - 10 16 Thousand/uL Final   11/29/2022 11 82 (H) 4 31 - 10 16 Thousand/uL Final     Platelets   Date Value Ref Range Status   12/08/2022 302 149 - 390 Thousands/uL Final   11/29/2022 272 149 - 390 Thousands/uL Final     Creatinine   Date Value Ref Range Status   12/08/2022 0 43 (L) 0 60 - 1 30 mg/dL Final     Comment:     Standardized to IDMS reference method   11/29/2022 0 50 (L) 0 60 - 1 30 mg/dL Final     Comment:     Standardized to IDMS reference method     AST   Date Value Ref Range Status   12/08/2022 15 13 - 39 U/L Final     Comment:     Specimen collection should occur prior to Sulfasalazine administration due to the potential for falsely depressed results  11/29/2022 21 5 - 45 U/L Final     Comment:     Specimen collection should occur prior to Sulfasalazine administration due to the potential for falsely depressed results  ALT   Date Value Ref Range Status   12/08/2022 9 7 - 52 U/L Final     Comment:     Specimen collection should occur prior to Sulfasalazine administration due to the potential for falsely depressed results  11/29/2022 20 12 - 78 U/L Final     Comment:     Specimen collection should occur prior to Sulfasalazine and/or Sulfapyridine administration due to the potential for falsely depressed results  Darlene Patten DO  12/11/2022  6:37 AM

## 2022-12-11 NOTE — LACTATION NOTE
This note was copied from a baby's chart  CONSULT - LACTATION  Baby Girl Renea Petty) [address] 1 days female MRN: [id number]    Shaunbenjamin PERES NURSERY Room / Bed: (N)/(N) Encounter: [id number]    Maternal Information     MOTHER:  Suzie Bey  Maternal Age: 29 y o    OB History: # 1 - Date: 22, Sex: Female, Weight: 3260 g (7 lb 3 oz), GA: 37w6d, Delivery: Vaginal, Spontaneous, Apgar1: 7, Apgar5: 9, Living: Living, Birth Comments: None   Previouse breast reduction surgery? No    Lactation history:   Has patient previously breast fed: No   How long had patient previously breast fed:     Previous breast feeding complications:       Past Surgical History:   Procedure Laterality Date   • CHOLECYSTECTOMY     • HAND SURGERY Right    • WISDOM TOOTH EXTRACTION          Birth information:  YOB: 2020   Time of birth: 9:18 PM   Sex: female   Delivery type: Vaginal, Spontaneous   Birth Weight: 3260 g (7 lb 3 oz)   Percent of Weight Change: 0%     Gestational Age: 41w10d   [unfilled]    Assessment     Breast and nipple assessment: normal assessment    [place] Assessment: no clinical assessmetn    Feeding assessment: no clinical assessment  LATCH:  Latch: Too sleepy or reluctant, no latch achieved   Audible Swallowing: None   Type of Nipple: Everted (After stimulation)   Comfort (Breast/Nipple): Soft/non-tender   Hold (Positioning): Full assist, staff holds infant at breast   LATCH Score: 4          Feeding recommendations:  rn states mom is able to latch baby; RN set up pump and demnstrated syringe feeding     Ed  On pumping    Ed  On how to est  Milk supply    Mom wants to bf  Ed  On positioning at the breast    RSB/DC reviewed    Mom has pump at home    Enc  To call lactation    Pumping:   - When pumping, begin in stimulation mode (high cycle, low vacuum) until milk begins to express  Change pump to expression mode (low cycle, high vacuum)   Use hands on pumping techniques to assist with milk transfer  When milk stops expressing, change back to stimulation mode  When milk begins to flow, change to expression mode  You make cycle pump up to three times in a pumping session  Milk Supply:   - Allow for non-nutritive suck at the breast to stimulate supply   - Allow for skin to skin during and after each breastfeeding session   - Use massage, heat, and hand expression prior to feedings to assist with deep latch   - Increase pumping sessions and pump after every feeding    Provided education on alignment of nose to breast; bring baby to breast and not breast to baby; support head with opp  Hand in cross cradle; use pillows to lift baby to be belly to belly; ear, shoulder, hip alignment; Support mother's back and place self in comfortable position to support bringing baby to the breast  Shoulders should be down and away from ears  Information on hand expression given  Discussed benefits of knowing how to manually express breast including stimulating milk supply, softening nipple for latch and evacuating breast in the event of engorgement  Mom is encouraged to place baby skin to skin for feedings  Skin to skin education provided for baby placement on mother's chest, baby only in diaper, blankets below shoulders on baby's back  Skin to skin is encouraged to continue at home for feedings and between feedings  Worked on positioning infant up at chest level and starting to feed infant with nose arriving at the nipple  Then, using areolar compression to achieve a deep latch that is comfortable and exchanges optimum amounts of milk       - Start feedings on breast that last feeding ended   - allow no more than 3 hours between breast feeding sessions   - time between feedings is counted from the beginning of the first feed to the beginning of the next feeding session    Reviewed early signs of hunger, including tensing of hands and shoulders - no need to wait for open eyes   Crying is a late hunger sign  If baby is crying, soothe baby first and then attempt to latch  Reviewed normal sucking patterns: transition from stimulation to nutritive to release or non-nutritive  The goal is to see and hear lots of swallowing  Reviewed normal nursing pattern: infant could latch on one breast up to 30 minutes or until releases on own  Signs of satiation is open hand with fingers that do not grab your finger  Discussed difference in sensation of non-nutritive v nutritive sucking    Met with mother  Provided mother with Ready, Set, Baby booklet  Discussed Skin to Skin contact an benefits to mom and baby  Talked about the delay of the first bath until baby has adjusted  Spoke about the benefits of rooming in  Feeding on cue and what that means for recognizing infant's hunger  Avoidance of pacifiers for the first month discussed  Talked about exclusive breastfeeding for the first 6 months  Positioning and latch reviewed as well as showing images of other feeding positions  Discussed the properties of a good latch in any position  Reviewed hand/manual expression  Discussed s/s that baby is getting enough milk and some s/s that breastfeeding dyad may need further help  Gave information on common concerns, what to expect the first few weeks after delivery, preparing for other caregivers, and how partners can help  Resources for support also provided  Encouraged parents to call for assistance, questions, and concerns about breastfeeding  Extension provided  Provided education on growth spurts, when to introduce bottles; paced bottle feeding, and non-nutritive suck at the breast  Provided education on Signs of satiation  Encouraged to call lactation to observe a latch prior to discharge for reassurance  Encouraged to call baby and me with any questions and closely monitor output        Dirk, [address] [place] 12/10/2022 8:26 PM

## 2022-12-11 NOTE — LACTATION NOTE
This note was copied from a baby's chart  Met with parents who are scheduled for discharge to home today with their baby girl  Viraj attempts at latching her baby to the breast have been unsuccessful  She reports baby latching on for a few sucks and then popping off  She has been manual expressing/pumping her colostrum and feeding that to baby  She did start some formula as well today as she has only been getting small amounts with expression  Did attempt to help baby latch baby, using the cross cradle, football and side lying holds  Baby take a few sucks and then becomes fussy at the breast      Positioning and Latch were reviewed:   - Position baby up at chest level using pillows for support    - Bring baby to breast,not breast to baby ( no hunching over )   - Align nose to nipple and drag nipple down to chin to achieve a wide open mouth and a deep latch   - Baby's ear, shoulder, hip in alignment   - Baby's upper and lower lip should be flanged on the breast     Also taught mom how to cup feed her baby  She was able to return demonstration and felt confident using the cup  Paced bottle feeding using the slow flow nipple was also discussed  Feeding Plan:  1) introduce breast first for feeding ( attempt, if baby becomes fussy supplement , also suggested pumping before latch attempt to see if baby latches)  2) to feed infant expressed breast milk after breast  3)  feed infant formula as needed ( until baby is latching well or pumped milk supply is in  4)  to pump after feedings   The Discharge Breastfeeding Booklet was briefly reviewed  Encouraged [place] to follow up at the [facility] for breastfeeding support

## 2022-12-12 NOTE — UTILIZATION REVIEW
NOTIFICATION OF INPATIENT ADMISSION   MATERNITY/DELIVERY AUTHORIZATION REQUEST   SERVICING FACILITY:   [facility] - L&D, , NICU  [address]  Tax ID: [id number]  NPI: [id number]   ATTENDING PROVIDER:  Attending Name and NPI#: Ren Christopher, [address] [[id number]]  Address: [address] [place] [address]  Phone: [phone]   ADMISSION INFORMATION:  Place of Service: Inpatient [address] [facility]  [address]  Place of Service Code: 21  Inpatient Admission Date/Time: 22  2:25 PM  Discharge Date/Time: 2022  3:50 PM  Admitting Diagnosis Code/Description:  Encounter for elective induction of labor [Z34 90]  40 weeks gestation of pregnancy [Z3A 37]  Encounter for full-term uncomplicated delivery [Q25]     Mother: Bharat Sadler 1990 Estimated Date of Delivery: 22  Delivering clinician: Ren Christopher    OB History        1    Para   1    Term   1       0    AB   0    Living   1       SAB   0    IAB   0    Ectopic   0    Multiple   0    Live Births   1                Name & MRN:   Information for the patient's :  Carmela Began Girl Nicole Bumpanish) [[id number]]      Delivery Information:  Sex: female  Delivered 2022 5:08 PM by Vaginal, Spontaneous; Gestational Age: 41w10d     Measurements:  Weight: 7 lb 3 oz (3260 g); Height: 19"    APGAR 1 minute 5 minutes 10 minutes   Totals: 7 9      [place] Birth Information: 29 y o  female MRN: [id number] Unit/Bed#: -01   Birthweight: No birth weight on file  Gestational Age: <None> Delivery Type:    APGARS Totals:        UTILIZATION REVIEW CONTACT:  Mackenzie Black, Utilization   Network Utilization Review Department  Phone: [phone]  Fax [phone]  Email: [email or web address]  Contact for approvals/pending authorizations, clinical reviews, and discharge  PHYSICIAN ADVISORY SERVICES:  Medical Necessity Denial & Wbhm-zm-Qvul Review  Phone: [phone]  Fax: [phone]  Email: [email or web address]         NOTIFICATION OF ADMISSION DISCHARGE   This is a Notification of Discharge from [address]  Please be advised that this patient has been discharge from our facility  Below you will find the admission and discharge date and time including the patient’s disposition  UTILIZATION REVIEW CONTACT:  Brandt Larson  Utilization   Network Utilization Review Department  Phone: [phone] x carefully listen to the prompts  All voicemails are confidential   Email: [email or web address]     ADMISSION INFORMATION  PRESENTATION DATE: 12/8/2022  2:25 PM  OBERVATION ADMISSION DATE:   INPATIENT ADMISSION DATE: 12/8/22  2:25 PM   DISCHARGE DATE: 12/11/2022  3:50 PM   DISPOSITION:Home/Self Care    IMPORTANT INFORMATION:  Send all requests for admission clinical reviews, approved or denied determinations and any other requests to dedicated fax number below belonging to the campus where the patient is receiving treatment   List of dedicated fax numbers:  [address] [place] [address] DENIALS (Administrative/Medical Necessity) [phone]   [address] [place] [address] (Maternity/NICU/Pediatrics) [phone]   [facility] [phone]   [place] [address] [phone]   [address] [phone]   [address] [facility] [phone]   [address] [phone]   [address] [place], [place] [address] [address] [phone]   [facility]  [phone] [address] [facility] [phone]   [address] [facility] [address]  [phone]

## 2022-12-15 ENCOUNTER — HOSPITAL ENCOUNTER (OUTPATIENT)
Facility: HOSPITAL | Age: 32
Setting detail: OBSERVATION
Discharge: HOME | End: 2022-12-17
Attending: EMERGENCY MEDICINE | Admitting: STUDENT IN AN ORGANIZED HEALTH CARE EDUCATION/TRAINING PROGRAM

## 2022-12-15 ENCOUNTER — POSTPARTUM VISIT (OUTPATIENT)
Dept: OBGYN CLINIC | Facility: MEDICAL CENTER | Age: 32
End: 2022-12-15

## 2022-12-15 VITALS
BODY MASS INDEX: 31.74 KG/M2 | DIASTOLIC BLOOD PRESSURE: 98 MMHG | WEIGHT: 202.2 LBS | SYSTOLIC BLOOD PRESSURE: 158 MMHG | HEIGHT: 67 IN

## 2022-12-15 DIAGNOSIS — R03.0 ELEVATED BLOOD PRESSURE READING: Primary | ICD-10-CM

## 2022-12-15 PROBLEM — O14.10 PREECLAMPSIA, SEVERE: Status: ACTIVE | Noted: 2022-12-15

## 2022-12-15 LAB
ALBUMIN SERPL BCP-MCNC: 3.3 G/DL (ref 3.5–5)
ALBUMIN SERPL BCP-MCNC: 3.4 G/DL (ref 3.5–5)
ALP SERPL-CCNC: 149 U/L (ref 34–104)
ALP SERPL-CCNC: 150 U/L (ref 34–104)
ALT SERPL W P-5'-P-CCNC: 17 U/L (ref 7–52)
ALT SERPL W P-5'-P-CCNC: 19 U/L (ref 7–52)
ANION GAP SERPL CALCULATED.3IONS-SCNC: 7 MMOL/L (ref 4–13)
ANION GAP SERPL CALCULATED.3IONS-SCNC: 8 MMOL/L (ref 4–13)
AST SERPL W P-5'-P-CCNC: 19 U/L (ref 13–39)
AST SERPL W P-5'-P-CCNC: 19 U/L (ref 13–39)
ATRIAL RATE: 72 BPM
BASOPHILS # BLD AUTO: 0.03 THOUSANDS/ÂΜL (ref 0–0.1)
BASOPHILS NFR BLD AUTO: 0 % (ref 0–1)
BILIRUB SERPL-MCNC: 0.53 MG/DL (ref 0.2–1)
BILIRUB SERPL-MCNC: 0.53 MG/DL (ref 0.2–1)
BUN SERPL-MCNC: 7 MG/DL (ref 5–25)
BUN SERPL-MCNC: 9 MG/DL (ref 5–25)
CALCIUM ALBUM COR SERPL-MCNC: 9 MG/DL (ref 8.3–10.1)
CALCIUM ALBUM COR SERPL-MCNC: 9.6 MG/DL (ref 8.3–10.1)
CALCIUM SERPL-MCNC: 8.5 MG/DL (ref 8.4–10.2)
CALCIUM SERPL-MCNC: 9 MG/DL (ref 8.4–10.2)
CHLORIDE SERPL-SCNC: 107 MMOL/L (ref 96–108)
CHLORIDE SERPL-SCNC: 107 MMOL/L (ref 96–108)
CO2 SERPL-SCNC: 24 MMOL/L (ref 21–32)
CO2 SERPL-SCNC: 27 MMOL/L (ref 21–32)
CREAT SERPL-MCNC: 0.56 MG/DL (ref 0.6–1.3)
CREAT SERPL-MCNC: 0.59 MG/DL (ref 0.6–1.3)
CREAT UR-MCNC: 20.4 MG/DL
EOSINOPHIL # BLD AUTO: 0.13 THOUSAND/ÂΜL (ref 0–0.61)
EOSINOPHIL NFR BLD AUTO: 1 % (ref 0–6)
ERYTHROCYTE [DISTWIDTH] IN BLOOD BY AUTOMATED COUNT: 12.1 % (ref 11.6–15.1)
ERYTHROCYTE [DISTWIDTH] IN BLOOD BY AUTOMATED COUNT: 12.2 % (ref 11.6–15.1)
GFR SERPL CREATININE-BSD FRML MDRD: 123 ML/MIN/1.73SQ M
GFR SERPL CREATININE-BSD FRML MDRD: 125 ML/MIN/1.73SQ M
GLUCOSE P FAST SERPL-MCNC: 121 MG/DL (ref 65–99)
GLUCOSE SERPL-MCNC: 114 MG/DL (ref 65–140)
GLUCOSE SERPL-MCNC: 121 MG/DL (ref 65–140)
GLUCOSE SERPL-MCNC: 72 MG/DL (ref 65–140)
HCT VFR BLD AUTO: 38.5 % (ref 34.8–46.1)
HCT VFR BLD AUTO: 38.6 % (ref 34.8–46.1)
HGB BLD-MCNC: 13 G/DL (ref 11.5–15.4)
HGB BLD-MCNC: 13.1 G/DL (ref 11.5–15.4)
IMM GRANULOCYTES # BLD AUTO: 0.05 THOUSAND/UL (ref 0–0.2)
IMM GRANULOCYTES NFR BLD AUTO: 1 % (ref 0–2)
LYMPHOCYTES # BLD AUTO: 2.36 THOUSANDS/ÂΜL (ref 0.6–4.47)
LYMPHOCYTES NFR BLD AUTO: 24 % (ref 14–44)
MAGNESIUM SERPL-MCNC: 4.8 MG/DL (ref 1.9–2.7)
MCH RBC QN AUTO: 30.4 PG (ref 26.8–34.3)
MCH RBC QN AUTO: 30.8 PG (ref 26.8–34.3)
MCHC RBC AUTO-ENTMCNC: 33.7 G/DL (ref 31.4–37.4)
MCHC RBC AUTO-ENTMCNC: 34 G/DL (ref 31.4–37.4)
MCV RBC AUTO: 90 FL (ref 82–98)
MCV RBC AUTO: 90 FL (ref 82–98)
MONOCYTES # BLD AUTO: 0.57 THOUSAND/ÂΜL (ref 0.17–1.22)
MONOCYTES NFR BLD AUTO: 6 % (ref 4–12)
NEUTROPHILS # BLD AUTO: 6.71 THOUSANDS/ÂΜL (ref 1.85–7.62)
NEUTS SEG NFR BLD AUTO: 68 % (ref 43–75)
NRBC BLD AUTO-RTO: 0 /100 WBCS
P AXIS: 44 DEGREES
PLATELET # BLD AUTO: 316 THOUSANDS/UL (ref 149–390)
PLATELET # BLD AUTO: 332 THOUSANDS/UL (ref 149–390)
PMV BLD AUTO: 9.5 FL (ref 8.9–12.7)
PMV BLD AUTO: 9.5 FL (ref 8.9–12.7)
POTASSIUM SERPL-SCNC: 3.3 MMOL/L (ref 3.5–5.3)
POTASSIUM SERPL-SCNC: 3.4 MMOL/L (ref 3.5–5.3)
PR INTERVAL: 130 MS
PROT SERPL-MCNC: 6.3 G/DL (ref 6.4–8.4)
PROT SERPL-MCNC: 6.4 G/DL (ref 6.4–8.4)
PROT UR-MCNC: 10 MG/DL
PROT/CREAT UR: 0.49 MG/G{CREAT} (ref 0–0.1)
QRS AXIS: 16 DEGREES
QRSD INTERVAL: 78 MS
QT INTERVAL: 388 MS
QTC INTERVAL: 424 MS
RBC # BLD AUTO: 4.26 MILLION/UL (ref 3.81–5.12)
RBC # BLD AUTO: 4.28 MILLION/UL (ref 3.81–5.12)
SODIUM SERPL-SCNC: 139 MMOL/L (ref 135–147)
SODIUM SERPL-SCNC: 141 MMOL/L (ref 135–147)
T WAVE AXIS: 48 DEGREES
VENTRICULAR RATE: 72 BPM
WBC # BLD AUTO: 10.24 THOUSAND/UL (ref 4.31–10.16)
WBC # BLD AUTO: 9.85 THOUSAND/UL (ref 4.31–10.16)

## 2022-12-15 RX ORDER — MAGNESIUM SULFATE HEPTAHYDRATE 40 MG/ML
4 INJECTION, SOLUTION INTRAVENOUS ONCE
Status: COMPLETED | OUTPATIENT
Start: 2022-12-15 | End: 2022-12-15

## 2022-12-15 RX ORDER — LABETALOL HYDROCHLORIDE 5 MG/ML
10 INJECTION, SOLUTION INTRAVENOUS ONCE
Status: DISCONTINUED | OUTPATIENT
Start: 2022-12-15 | End: 2022-12-15

## 2022-12-15 RX ORDER — NIFEDIPINE 30 MG/1
30 TABLET, EXTENDED RELEASE ORAL DAILY
Status: DISCONTINUED | OUTPATIENT
Start: 2022-12-15 | End: 2022-12-17 | Stop reason: HOSPADM

## 2022-12-15 RX ORDER — LORATADINE 10 MG/1
10 TABLET ORAL DAILY PRN
Status: DISCONTINUED | OUTPATIENT
Start: 2022-12-15 | End: 2022-12-17 | Stop reason: HOSPADM

## 2022-12-15 RX ORDER — MAGNESIUM SULFATE HEPTAHYDRATE 40 MG/ML
2 INJECTION, SOLUTION INTRAVENOUS ONCE
Status: COMPLETED | OUTPATIENT
Start: 2022-12-15 | End: 2022-12-15

## 2022-12-15 RX ORDER — CALCIUM CARBONATE 200(500)MG
1000 TABLET,CHEWABLE ORAL DAILY PRN
Status: DISCONTINUED | OUTPATIENT
Start: 2022-12-15 | End: 2022-12-17 | Stop reason: HOSPADM

## 2022-12-15 RX ORDER — DOCUSATE SODIUM 100 MG/1
100 CAPSULE, LIQUID FILLED ORAL 2 TIMES DAILY
Status: DISCONTINUED | OUTPATIENT
Start: 2022-12-15 | End: 2022-12-17 | Stop reason: HOSPADM

## 2022-12-15 RX ORDER — METFORMIN HYDROCHLORIDE 500 MG/1
2000 TABLET, EXTENDED RELEASE ORAL
Status: DISCONTINUED | OUTPATIENT
Start: 2022-12-15 | End: 2022-12-17 | Stop reason: HOSPADM

## 2022-12-15 RX ORDER — ONDANSETRON 2 MG/ML
4 INJECTION INTRAMUSCULAR; INTRAVENOUS EVERY 8 HOURS PRN
Status: DISCONTINUED | OUTPATIENT
Start: 2022-12-15 | End: 2022-12-17 | Stop reason: HOSPADM

## 2022-12-15 RX ORDER — DIAPER,BRIEF,INFANT-TODD,DISP
1 EACH MISCELLANEOUS DAILY PRN
Status: DISCONTINUED | OUTPATIENT
Start: 2022-12-15 | End: 2022-12-17 | Stop reason: HOSPADM

## 2022-12-15 RX ORDER — LABETALOL HYDROCHLORIDE 5 MG/ML
20 INJECTION, SOLUTION INTRAVENOUS ONCE
Status: COMPLETED | OUTPATIENT
Start: 2022-12-15 | End: 2022-12-15

## 2022-12-15 RX ORDER — SODIUM CHLORIDE, SODIUM LACTATE, POTASSIUM CHLORIDE, CALCIUM CHLORIDE 600; 310; 30; 20 MG/100ML; MG/100ML; MG/100ML; MG/100ML
50 INJECTION, SOLUTION INTRAVENOUS CONTINUOUS
Status: DISCONTINUED | OUTPATIENT
Start: 2022-12-15 | End: 2022-12-17 | Stop reason: HOSPADM

## 2022-12-15 RX ORDER — MAGNESIUM SULFATE HEPTAHYDRATE 40 MG/ML
2 INJECTION, SOLUTION INTRAVENOUS CONTINUOUS
Status: DISPENSED | OUTPATIENT
Start: 2022-12-15 | End: 2022-12-16

## 2022-12-15 RX ORDER — CALCIUM GLUCONATE 94 MG/ML
1 INJECTION, SOLUTION INTRAVENOUS ONCE AS NEEDED
Status: DISCONTINUED | OUTPATIENT
Start: 2022-12-15 | End: 2022-12-17 | Stop reason: HOSPADM

## 2022-12-15 RX ORDER — IBUPROFEN 600 MG/1
600 TABLET ORAL EVERY 6 HOURS
Status: DISCONTINUED | OUTPATIENT
Start: 2022-12-15 | End: 2022-12-17 | Stop reason: HOSPADM

## 2022-12-15 RX ORDER — ACETAMINOPHEN 325 MG/1
975 TABLET ORAL EVERY 6 HOURS PRN
Status: DISCONTINUED | OUTPATIENT
Start: 2022-12-15 | End: 2022-12-17 | Stop reason: HOSPADM

## 2022-12-15 RX ADMIN — IBUPROFEN 600 MG: 600 TABLET ORAL at 21:32

## 2022-12-15 RX ADMIN — Medication 2 G/HR: at 16:08

## 2022-12-15 RX ADMIN — LABETALOL HYDROCHLORIDE 20 MG: 5 INJECTION, SOLUTION INTRAVENOUS at 14:32

## 2022-12-15 RX ADMIN — MAGNESIUM SULFATE HEPTAHYDRATE 2 G: 40 INJECTION, SOLUTION INTRAVENOUS at 15:41

## 2022-12-15 RX ADMIN — IBUPROFEN 600 MG: 600 TABLET ORAL at 15:34

## 2022-12-15 RX ADMIN — METFORMIN HYDROCHLORIDE 2000 MG: 500 TABLET, EXTENDED RELEASE ORAL at 18:43

## 2022-12-15 RX ADMIN — MAGNESIUM SULFATE HEPTAHYDRATE 4 G: 40 INJECTION, SOLUTION INTRAVENOUS at 14:46

## 2022-12-15 RX ADMIN — NIFEDIPINE 30 MG: 30 TABLET, FILM COATED, EXTENDED RELEASE ORAL at 19:38

## 2022-12-15 RX ADMIN — SODIUM CHLORIDE, POTASSIUM CHLORIDE, SODIUM LACTATE AND CALCIUM CHLORIDE 50 ML/HR: 600; 310; 30; 20 INJECTION, SOLUTION INTRAVENOUS at 14:45

## 2022-12-15 NOTE — ASSESSMENT & PLAN NOTE
Lochia WNL   Recovering well   Appropriate bowel and bladder function   Pain well controlled   Breastfeeding/pumping   Motrin/tylenol for pain   Perineal care

## 2022-12-15 NOTE — H&P
[address] [place] [address] JULIET Bey 29 y o  female MRN: [id number]  Unit/Bed#: ED-38 Encounter: [id number]      Assessment: Mdaeline Whitaker 29 y o  Wilfredo Hamm now 6 days postpartum from  with known gestational hypertension now with new onset of your hypertension  She is a patient of [facility]    Plan:     * Preeclampsia, severe  Assessment & Plan  Patient dx with preeclampsia w severe, by SRBP   Denies vision changes, chest pain, shortness of breath, RUQ/epigastric pain   Reports headache 2/10- plan for tylenol   She has required 20mg IV labetalol for acute antihypertensive treatment   Magnesium 6g bolus followed by 2g/hr   IVF 18LT/AC   Systolic (48ILS), LHJ:872 , Min:118 , KXR:359     Diastolic (06NLR), YJR:89, Min:72, Max:98        Lab Results   Component Value Date/Time    HGB 13 1 12/15/2022 02:08 PM     12/15/2022 02:08 PM    AST 19 12/15/2022 02:08 PM    ALT 17 12/15/2022 02:08 PM    CREATININE 0 59 (L) 12/15/2022 02:08 PM         Postpartum state  Assessment & Plan  Lochia WNL   Recovering well   Appropriate bowel and bladder function   Pain well controlled   Breastfeeding/pumping   Motrin/tylenol for pain   Perineal care              SUBJECTIVE:    Chief Complaint: sent over from the office for elevated blood pressures    HPI: Madeline Whitaker is a 29 y o   6 days postpartum from uncomplicated  who presents after being sent from the office with severely elevated blood pressures  Patient has known gestational hypertension  This is the first instance of severely elevated blood pressures  On presentation to the ED she had persistent severely elevated blood pressures  She reports mild headache rating 2 out of 10  She denies vision changes chest pain, shortness of breath, right upper quadrant/epigastric pain  She does report increased swelling in her legs  She reports minimal lochia  She denies fever, chills, abnormal foul-smelling discharge, dysuria      Review of Systems Constitutional: Negative for chills and fever  Eyes: Negative for visual disturbance  Respiratory: Negative for shortness of breath  Cardiovascular: Negative for chest pain  Gastrointestinal: Negative for abdominal pain, nausea and vomiting  Genitourinary: Positive for vaginal bleeding  Neurological: Positive for light-headedness  OB History    Para Term  AB Living   1 1 1 0 0 1   SAB IAB Ectopic Multiple Live Births   0 0 0 0 1      # Outcome Date GA Lbr Jack/2nd Weight Sex Delivery Anes PTL Lv   1 Term 22 37w6d  3260 g (7 lb 3 oz) F Vag-Spont EPI  LUTHER       Past Surgical History:   Procedure Laterality Date   • CHOLECYSTECTOMY     • HAND SURGERY Right    • WISDOM TOOTH EXTRACTION         Social History     Tobacco Use   • Smoking status: Never   • Smokeless tobacco: Never   Substance Use Topics   • Alcohol use: Yes     Comment: occasionally       Allergies   Allergen Reactions   • Latex Rash       OBJECTIVE:  Vitals:  Temp:  [98 2 °F (36 8 °C)] 98 2 °F (36 8 °C)  HR:  [64-81] 67  Resp:  [16-18] 16  BP: (118-174)/(72-98) 118/72  There is no height or weight on file to calculate BMI  Physical Exam:    General Appearance: Awake, alert and not in acute distress     CV: RRR   Pulm: Lungs CTA, bilaterally   GI: Soft, non-tender to palpation in all four quadrants  MSK: Moves upper and lower extremities without difficulty   Lower Extremities: +1 edema,  non-erythematous, not painful to palpation    Neuro: +2 reflexes in UE/LE bilaterally and +1 clonus     Recent Results (from the past 24 hour(s))   ECG 12 lead    Collection Time: 12/15/22  1:50 PM   Result Value Ref Range    Ventricular Rate 72 BPM    Atrial Rate 72 BPM    KY Interval 130 ms    QRSD Interval 78 ms    QT Interval 388 ms    QTC Interval 424 ms    P [place] 44 degrees    QRS Axis 16 degrees    T Wave Axis 48 degrees   CBC and differential    Collection Time: 12/15/22  2:08 PM   Result Value Ref Range    WBC 9 85 4 31 - 10 16 Thousand/uL    RBC 4 26 3 81 - 5 12 Million/uL    Hemoglobin 13 1 11 5 - 15 4 g/dL    Hematocrit 38 5 34 8 - 46 1 %    MCV 90 82 - 98 fL    MCH 30 8 26 8 - 34 3 pg    MCHC 34 0 31 4 - 37 4 g/dL    RDW 12 2 11 6 - 15 1 %    MPV 9 5 8 9 - 12 7 fL    Platelets 188 386 - 529 Thousands/uL    nRBC 0 /100 WBCs    Neutrophils Relative 68 43 - 75 %    Immat GRANS % 1 0 - 2 %    Lymphocytes Relative 24 14 - 44 %    Monocytes Relative 6 4 - 12 %    Eosinophils Relative 1 0 - 6 %    Basophils Relative 0 0 - 1 %    Neutrophils Absolute 6 71 1 85 - 7 62 Thousands/µL    Immature Grans Absolute 0 05 0 00 - 0 20 Thousand/uL    Lymphocytes Absolute 2 36 0 60 - 4 47 Thousands/µL    Monocytes Absolute 0 57 0 17 - 1 22 Thousand/µL    Eosinophils Absolute 0 13 0 00 - 0 61 Thousand/µL    Basophils Absolute 0 03 0 00 - 0 10 Thousands/µL   Comprehensive metabolic panel    Collection Time: 12/15/22  2:08 PM   Result Value Ref Range    Sodium 139 135 - 147 mmol/L    Potassium 3 3 (L) 3 5 - 5 3 mmol/L    Chloride 107 96 - 108 mmol/L    CO2 24 21 - 32 mmol/L    ANION GAP 8 4 - 13 mmol/L    BUN 9 5 - 25 mg/dL    Creatinine 0 59 (L) 0 60 - 1 30 mg/dL    Glucose 121 65 - 140 mg/dL    Glucose, Fasting 121 (H) 65 - 99 mg/dL    Calcium 9 0 8 4 - 10 2 mg/dL    Corrected Calcium 9 6 8 3 - 10 1 mg/dL    AST 19 13 - 39 U/L    ALT 17 7 - 52 U/L    Alkaline Phosphatase 149 (H) 34 - 104 U/L    Total Protein 6 3 (L) 6 4 - 8 4 g/dL    Albumin 3 3 (L) 3 5 - 5 0 g/dL    Total Bilirubin 0 53 0 20 - 1 00 mg/dL    eGFR 123 ml/min/1 73sq m   Protein / creatinine ratio, urine    Collection Time: 12/15/22  2:13 PM   Result Value Ref Range    Creatinine, Ur 20 4 mg/dL    Protein Urine Random 10 mg/dL    Prot/Creat Ratio, Ur 0 49 (H) 0 00 - 0 10         Dr Chavarria Flank aware    <2 3 [facility], MD  OB/GYN PGY-3  12/15/2022  3:13 PM

## 2022-12-15 NOTE — ED PROVIDER NOTES
History  Chief Complaint   Patient presents with   • Hypertension     Pt to ED from post partum visit, sent to ED for eval after BP reading of 168/98       History provided by:  Patient   used: No      82-year-old female presenting to emergency department with elevated blood pressure, 6 weeks postpartum  Not headache today  Denies abdominal pain  No vision changes  No nausea vomiting  Has edema in the lower extremities, getting better since delivery  No fevers or chills  mdm will eval for preeclampsia, labetalol for blood pressure, OB consult      Prior to Admission Medications   Prescriptions Last Dose Informant Patient Reported? Taking? Blood Glucose Monitoring Suppl (OneTouch Verio Flex System) w/Device KIT   No No   Sig: Dispense 1 kit per insurance formulary  Patient not taking: Reported on 12/15/2022   Insulin Pen Needle (BD Pen Needle Ngoc U/F) 32G X 4 MM MISC   No No   Sig: Use up to 5 a day as recommended  Patient not taking: Reported on 21/53/2207   OneTouch Delica Lancets 80K MISC   No No   Sig: Use 6 a day or as directed  T2DM     Patient not taking: Reported on 12/15/2022   acetaminophen (TYLENOL) 325 mg tablet   No No   Sig: Take 2 tablets (650 mg total) by mouth every 6 (six) hours   cholecalciferol (VITAMIN D3) 400 units tablet   Yes No   Sig: Take 400 Units by mouth daily   ibuprofen (MOTRIN) 600 mg tablet   No No   Sig: Take 1 tablet (600 mg total) by mouth every 6 (six) hours   loratadine (CLARITIN) 10 mg tablet   No No   Sig: TAKE 1 TABLET BY MOUTH EVERY DAY   metFORMIN (GLUCOPHAGE-XR) 500 mg 24 hr tablet 12/14/2022 at 1800  No Yes   Sig: Take 4 tablets (2,000 mg total) by mouth daily with dinner      Facility-Administered Medications: None       Past Medical History:   Diagnosis Date   • 16 weeks gestation of pregnancy 7/14/2022   • Annual physical exam 7/14/2022   • Asthma    • BMI 29 0-29 9,adult 5/18/2021   • BMI 30 0-30 9,adult 5/6/2021   • BMI 31 0-31 9,adult 8/24/2021   • BMI 33 0-33 9,adult 12/14/2021   • Constipation 12/14/2021   • COVID-19 5/6/2021   • Diabetes mellitus ([place] [address] )    • DM2 (diabetes mellitus, type 2) ([place] [address] )    • Elevated blood pressure reading in office without diagnosis of hypertension 3/31/2022   • Elevated cortisol level    • Hand pain, right    • High triglycerides    • Hypertriglyceridemia 5/2/2021   • Microalbuminuria 5/18/2021   • Mild intermittent asthma without complication 3/8/4714   • Obesity    • Rhinitis, allergic 5/6/2021   • Sinobronchitis    • Skin rash 7/14/2022   • Type 2 diabetes mellitus ([place] [address] )        Past Surgical History:   Procedure Laterality Date   • CHOLECYSTECTOMY     • HAND SURGERY Right    • WISDOM TOOTH EXTRACTION         Family History   Problem Relation Age of Onset   • Diabetes Mother    • Hypertension Mother    • COPD Father    • Fibromyalgia Father    • No Known Problems Brother    • Heart disease Maternal Grandmother    • Stroke Maternal Grandmother    • Heart attack Maternal Grandmother    • Heart disease Maternal Grandfather    • Heart attack Maternal Grandfather    • No Known Problems Paternal Grandmother    • Prostate cancer Paternal Grandfather      I have reviewed and agree with the history as documented  E-Cigarette/Vaping   • E-Cigarette Use Never User      E-Cigarette/Vaping Substances   • Nicotine No    • THC No    • CBD No    • Flavoring No    • Other No    • Unknown No      Social History     Tobacco Use   • Smoking status: Never   • Smokeless tobacco: Never   Vaping Use   • Vaping Use: Never used   Substance Use Topics   • Alcohol use: Yes     Comment: occasionally   • Drug use: No       Review of Systems   Constitutional: Negative for chills, diaphoresis and fever  HENT: Negative for congestion and sore throat  Respiratory: Negative for cough, shortness of breath, wheezing and stridor  Cardiovascular: Negative for chest pain, palpitations and leg swelling     Gastrointestinal: Negative for abdominal pain, blood in stool, diarrhea, nausea and vomiting  Genitourinary: Negative for dysuria, frequency and urgency  Musculoskeletal: Negative for neck pain and neck stiffness  Skin: Negative for pallor and rash  Neurological: Positive for headaches  Negative for dizziness, syncope, weakness and light-headedness  All other systems reviewed and are negative  Physical Exam  Physical Exam  Vitals reviewed  Constitutional:       Appearance: Normal appearance  She is well-developed  HENT:      Head: Normocephalic and atraumatic  Eyes:      Extraocular Movements: Extraocular movements intact  Pupils: Pupils are equal, round, and reactive to light  Cardiovascular:      Rate and Rhythm: Normal rate and regular rhythm  Heart sounds: Normal heart sounds  Pulmonary:      Effort: Pulmonary effort is normal  No respiratory distress  Breath sounds: Normal breath sounds  Abdominal:      General: Bowel sounds are normal       Palpations: Abdomen is soft  Tenderness: There is no abdominal tenderness  Musculoskeletal:         General: No swelling or tenderness  Normal range of motion  Cervical back: Normal range of motion and neck supple  Right lower leg: Edema present  Left lower leg: Edema present  Skin:     General: Skin is warm and dry  Capillary Refill: Capillary refill takes less than 2 seconds  Neurological:      General: No focal deficit present  Mental Status: She is alert and oriented to person, place, and time  Cranial Nerves: No cranial nerve deficit  Sensory: No sensory deficit  Motor: No weakness        Coordination: Coordination normal       Gait: Gait normal          Vital Signs  ED Triage Vitals   Temperature Pulse Respirations Blood Pressure SpO2   12/15/22 1257 12/15/22 1257 12/15/22 1257 12/15/22 1257 12/15/22 1257   98 2 °F (36 8 °C) 76 18 (!) 174/95 99 %      Temp Source Heart Rate Source Patient Position - Orthostatic VS BP Location FiO2 (%)   12/15/22 1608 12/15/22 1352 12/15/22 1352 12/15/22 1352 --   Oral Monitor Sitting Right arm       Pain Score       12/15/22 1534       No Pain           Vitals:    12/15/22 1530 12/15/22 1608 12/15/22 1630 12/15/22 1645   BP: 128/68 134/72 163/79 152/82   Pulse: 76 74 76 87   Patient Position - Orthostatic VS: Sitting Lying           Visual Acuity      ED Medications  Medications   lactated ringers infusion (50 mL/hr Intravenous New Bag 12/15/22 1445)   magnesium sulfate 20 g/500 mL infusion (premix) (2 g/hr Intravenous New Bag 12/15/22 1608)   calcium gluconate 10 % injection 1 g (has no administration in time range)   acetaminophen (TYLENOL) tablet 975 mg (has no administration in time range)   ibuprofen (MOTRIN) tablet 600 mg (600 mg Oral Given 12/15/22 1534)   docusate sodium (COLACE) capsule 100 mg (has no administration in time range)   ondansetron (ZOFRAN) injection 4 mg (has no administration in time range)   calcium carbonate (TUMS) chewable tablet 1,000 mg (has no administration in time range)   benzocaine-menthol-lanolin-aloe (DERMOPLAST) 20-0 5 % topical spray 1 application (has no administration in time range)   witch hazel-glycerin (TUCKS) topical pad 1 pad (has no administration in time range)   hydrocortisone 1 % cream 1 application (has no administration in time range)   metFORMIN (GLUCOPHAGE-XR) 24 hr tablet 2,000 mg (has no administration in time range)   loratadine (CLARITIN) tablet 10 mg (has no administration in time range)   labetalol (NORMODYNE) injection 20 mg (20 mg Intravenous Given 12/15/22 1432)   magnesium sulfate 4 g/100 mL IVPB (premix) 4 g (0 g Intravenous Stopped 12/15/22 1531)     Followed by   magnesium sulfate 2 g/50 mL IVPB (premix) 2 g (0 g Intravenous Stopped 12/15/22 1551)       Diagnostic Studies  Results Reviewed     Procedure Component Value Units Date/Time    Comprehensive metabolic panel [[id number]]  (Abnormal) Collected: 12/15/22 1408    Lab Status: Final result Specimen: Blood from Arm, Right Updated: 12/15/22 1438     Sodium 139 mmol/L      Potassium 3 3 mmol/L      Chloride 107 mmol/L      CO2 24 mmol/L      ANION GAP 8 mmol/L      BUN 9 mg/dL      Creatinine 0 59 mg/dL      Glucose 121 mg/dL      Glucose, Fasting 121 mg/dL      Calcium 9 0 mg/dL      Corrected Calcium 9 6 mg/dL      AST 19 U/L      ALT 17 U/L      Alkaline Phosphatase 149 U/L      Total Protein 6 3 g/dL      Albumin 3 3 g/dL      Total Bilirubin 0 53 mg/dL      eGFR 123 ml/min/1 73sq m     Narrative:      National Kidney Disease Foundation guidelines for Chronic Kidney Disease (CKD):   •  Stage 1 with normal or high GFR (GFR > 90 mL/min/1 73 square meters)  •  Stage 2 Mild CKD (GFR = 60-89 mL/min/1 73 square meters)  •  Stage 3A Moderate CKD (GFR = 45-59 mL/min/1 73 square meters)  •  Stage 3B Moderate CKD (GFR = 30-44 mL/min/1 73 square meters)  •  Stage 4 Severe CKD (GFR = 15-29 mL/min/1 73 square meters)  •  Stage 5 End Stage CKD (GFR <15 mL/min/1 73 square meters)  Note: GFR calculation is accurate only with a steady state creatinine    Protein / creatinine ratio, urine [[id number]]  (Abnormal) Collected: 12/15/22 1413    Lab Status: Final result Specimen: Urine, Clean Catch Updated: 12/15/22 1432     Creatinine, Ur 20 4 mg/dL      Protein Urine Random 10 mg/dL      Prot/Creat Ratio, Ur 0 49    CBC and differential [[id number]] Collected: 12/15/22 1408    Lab Status: Final result Specimen: Blood from Arm, Right Updated: 12/15/22 1422     WBC 9 85 Thousand/uL      RBC 4 26 Million/uL      Hemoglobin 13 1 g/dL      Hematocrit 38 5 %      MCV 90 fL      MCH 30 8 pg      MCHC 34 0 g/dL      RDW 12 2 %      MPV 9 5 fL      Platelets 334 Thousands/uL      nRBC 0 /100 WBCs      Neutrophils Relative 68 %      Immat GRANS % 1 %      Lymphocytes Relative 24 %      Monocytes Relative 6 %      Eosinophils Relative 1 %      Basophils Relative 0 %      Neutrophils Absolute 6 71 Thousands/µL      Immature Grans Absolute 0 05 Thousand/uL      Lymphocytes Absolute 2 36 Thousands/µL      Monocytes Absolute 0 57 Thousand/µL      Eosinophils Absolute 0 13 Thousand/µL      Basophils Absolute 0 03 Thousands/µL                  No orders to display              Procedures  Procedures         ED Course  ED Course as of 12/15/22 1658   u Dec 15, 2022   1352 ECG shows rate of 72, sinus, normal axis, normal QRS, no significant ST or T changes, normal intervals, independently interpreted by me   1405 Spoke with OB  They will come to evaluate  MDM    Disposition  Final diagnoses:   Postpartum hypertension     Time reflects when diagnosis was documented in both MDM as applicable and the Disposition within this note     Time User Action Codes Description Comment    12/15/2022  2:11 PM Neema Briscoe Add [O16 5] Postpartum hypertension       ED Disposition     ED Disposition   Admit    Condition   Stable    Date/Time   Thu Dec 15, 2022  2:11 PM    Comment   Case was discussed with Dr Tosha Sweeney and the patient's admission status was agreed to be Admission Status: observation status to the service of Dr Tosha Sweeney              Follow-up Information    None         Current Discharge Medication List      CONTINUE these medications which have NOT CHANGED    Details   metFORMIN (GLUCOPHAGE-XR) 500 mg 24 hr tablet Take 4 tablets (2,000 mg total) by mouth daily with dinner  Qty: 360 tablet, Refills: 1    Associated Diagnoses: Pre-existing type 2 diabetes mellitus with hyperglycemia during pregnancy in third trimester ([facility] ); 32 weeks gestation of pregnancy; Obesity affecting pregnancy in third trimester; BMI 32 0-32 9,adult      acetaminophen (TYLENOL) 325 mg tablet Take 2 tablets (650 mg total) by mouth every 6 (six) hours  Qty: 30 tablet, Refills: 0    Associated Diagnoses:  (spontaneous vaginal delivery)      Blood Glucose Monitoring Suppl (OneTouch Verio Flex System) w/Device KIT Dispense 1 kit per insurance formulary  Qty: 1 kit, Refills: 0    Associated Diagnoses: Pre-existing type 2 diabetes mellitus with hyperglycemia during pregnancy in first trimester ([facility] 75 ); Obesity affecting pregnancy in first trimester; 12 weeks gestation of pregnancy; BMI 32 0-32 9,adult      cholecalciferol (VITAMIN D3) 400 units tablet Take 400 Units by mouth daily      ibuprofen (MOTRIN) 600 mg tablet Take 1 tablet (600 mg total) by mouth every 6 (six) hours  Qty: 30 tablet, Refills: 0    Associated Diagnoses:  (spontaneous vaginal delivery)      Insulin Pen Needle (BD Pen Needle Ngoc U/F) 32G X 4 MM MISC Use up to 5 a day as recommended  Qty: 150 each, Refills: 0    Associated Diagnoses: Pre-existing type 2 diabetes mellitus with hyperglycemia during pregnancy in second trimester ([facility] 75 ); 25 weeks gestation of pregnancy; Obesity affecting pregnancy in second trimester; BMI 31 0-31 9,adult      loratadine (CLARITIN) 10 mg tablet TAKE 1 TABLET BY MOUTH EVERY DAY  Qty: 90 tablet, Refills: 2    Associated Diagnoses: Encounter for supervision of normal first pregnancy in second trimester; Rash      OneTouch Delica Lancets 95X MISC Use 6 a day or as directed  T2DM  Qty: 150 each, Refills: 6    Associated Diagnoses: Pre-existing type 2 diabetes mellitus with hyperglycemia during pregnancy in first trimester ([facility] 75 ); Obesity affecting pregnancy in first trimester; 12 weeks gestation of pregnancy; BMI 32 0-32 9,adult             No discharge procedures on file      PDMP Review     None          ED Provider  Electronically Signed by           Chris Mi MD  12/15/22 5329

## 2022-12-15 NOTE — ASSESSMENT & PLAN NOTE
Patient dx with preeclampsia w severe, by SRBP   Denies HA, vision changes, chest pain, shortness of breath, RUQ/epigastric pain   She has required 20mg IV labetalol for acute antihypertensive treatment   S/p 24h Magnesium  Procardia XL 30mg qhs started 05/24 pm    Systolic (03XMM), BEB:902 , Min:112 , OUR:622     Diastolic (37TNM), XFZ:60, Min:55, Max:86        Lab Results   Component Value Date/Time    HGB 14 0 12/16/2022 10:16 AM    HGB 13 0 12/15/2022 09:27 PM     12/16/2022 10:16 AM     12/15/2022 09:27 PM    AST 19 12/16/2022 10:16 AM    AST 19 12/15/2022 09:27 PM    ALT 20 12/16/2022 10:16 AM    ALT 19 12/15/2022 09:27 PM    CREATININE 0 46 (L) 12/16/2022 10:16 AM    CREATININE 0 56 (L) 12/15/2022 09:27 PM

## 2022-12-15 NOTE — PROGRESS NOTES
Suzie Bey  1990    S:  29 y o  female here for postpartum BP check accompanied by FOB and  daughter  She is s/p IOL at 37 6 weeks for gHTN and had an  Uncomplicated vaginal delivery on 22 at 5:08 pm           Gender: female   Apgars: 7,9  Weight: 7 lbs 3 oz  Her lochia has resolved  She is pumping and bottle feeding without problems  She is taking motrin 600 mg as needed for cramping  Last taken today at 0800  O:   /98 (BP Location: Right arm, Patient Position: Sitting, Cuff Size: Large)   Ht 5' 7" (1 702 m)   Wt 91 7 kg (202 lb 3 2 oz)   LMP 03/10/2022   Breastfeeding Yes Comment: pumping  BMI 31 67 kg/m²  Repeat BP taken by myself at end of visit was 160/98 in right arm  She appears well and in no distress  Admits to a headache but attributed it to lack of sleep (3 hours last night)  Bilateral lower leg edema noted  States no change since delivery  A/P:  Recommended heading to ER now for evaluation  Discussed status with Dr Botello Gains  Return to office in 1 week for BP follow up  She will return in 3-4 months for her yearly exam, sooner PRN

## 2022-12-15 NOTE — PROGRESS NOTES
Vitals:    12/15/22 1700 12/15/22 1715 12/15/22 1730 12/15/22 1800   BP: 141/72 145/84 144/78 140/75   BP Location:       Pulse: 67 78 76 77   Resp:       Temp:       TempSrc:       SpO2:         On review of blood pressure trend, noted to be continuously elevated and with further non-sustained SRBP  Will start Procardia 30mg XL daily now  Will continue to monitor  Discussed with Dr Smith Abel, who reviewed blood pressures with cris Turner MD  Ob/Gyn R-4  12/15/22 6:09 PM

## 2022-12-16 LAB
ALBUMIN SERPL BCP-MCNC: 3.7 G/DL (ref 3.5–5)
ALP SERPL-CCNC: 150 U/L (ref 34–104)
ALT SERPL W P-5'-P-CCNC: 20 U/L (ref 7–52)
ANION GAP SERPL CALCULATED.3IONS-SCNC: 9 MMOL/L (ref 4–13)
AST SERPL W P-5'-P-CCNC: 19 U/L (ref 13–39)
BILIRUB SERPL-MCNC: 0.56 MG/DL (ref 0.2–1)
BUN SERPL-MCNC: 5 MG/DL (ref 5–25)
CALCIUM SERPL-MCNC: 7.4 MG/DL (ref 8.4–10.2)
CHLORIDE SERPL-SCNC: 107 MMOL/L (ref 96–108)
CO2 SERPL-SCNC: 25 MMOL/L (ref 21–32)
CREAT SERPL-MCNC: 0.46 MG/DL (ref 0.6–1.3)
ERYTHROCYTE [DISTWIDTH] IN BLOOD BY AUTOMATED COUNT: 12.1 % (ref 11.6–15.1)
GFR SERPL CREATININE-BSD FRML MDRD: 133 ML/MIN/1.73SQ M
GLUCOSE SERPL-MCNC: 103 MG/DL (ref 65–140)
GLUCOSE SERPL-MCNC: 176 MG/DL (ref 65–140)
GLUCOSE SERPL-MCNC: 78 MG/DL (ref 65–140)
GLUCOSE SERPL-MCNC: 81 MG/DL (ref 65–140)
GLUCOSE SERPL-MCNC: 96 MG/DL (ref 65–140)
GLUCOSE SERPL-MCNC: 97 MG/DL (ref 65–140)
HCT VFR BLD AUTO: 41 % (ref 34.8–46.1)
HGB BLD-MCNC: 14 G/DL (ref 11.5–15.4)
MAGNESIUM SERPL-MCNC: 5.6 MG/DL (ref 1.9–2.7)
MCH RBC QN AUTO: 30.5 PG (ref 26.8–34.3)
MCHC RBC AUTO-ENTMCNC: 34.1 G/DL (ref 31.4–37.4)
MCV RBC AUTO: 89 FL (ref 82–98)
PLATELET # BLD AUTO: 360 THOUSANDS/UL (ref 149–390)
PMV BLD AUTO: 9.2 FL (ref 8.9–12.7)
POTASSIUM SERPL-SCNC: 3.5 MMOL/L (ref 3.5–5.3)
PROT SERPL-MCNC: 7.1 G/DL (ref 6.4–8.4)
RBC # BLD AUTO: 4.59 MILLION/UL (ref 3.81–5.12)
SODIUM SERPL-SCNC: 141 MMOL/L (ref 135–147)
WBC # BLD AUTO: 9.78 THOUSAND/UL (ref 4.31–10.16)

## 2022-12-16 RX ADMIN — METFORMIN HYDROCHLORIDE 2000 MG: 500 TABLET, EXTENDED RELEASE ORAL at 18:32

## 2022-12-16 RX ADMIN — Medication 2 G/HR: at 02:41

## 2022-12-16 RX ADMIN — SODIUM CHLORIDE, POTASSIUM CHLORIDE, SODIUM LACTATE AND CALCIUM CHLORIDE 50 ML/HR: 600; 310; 30; 20 INJECTION, SOLUTION INTRAVENOUS at 08:28

## 2022-12-16 RX ADMIN — IBUPROFEN 600 MG: 600 TABLET ORAL at 19:46

## 2022-12-16 RX ADMIN — ACETAMINOPHEN 975 MG: 325 TABLET ORAL at 00:40

## 2022-12-16 RX ADMIN — Medication 2 G/HR: at 12:54

## 2022-12-16 RX ADMIN — NIFEDIPINE 30 MG: 30 TABLET, FILM COATED, EXTENDED RELEASE ORAL at 08:11

## 2022-12-16 RX ADMIN — IBUPROFEN 600 MG: 600 TABLET ORAL at 02:41

## 2022-12-16 NOTE — UTILIZATION REVIEW
Initial Clinical Review    Admission: Date/Time/Statement:   Admission Orders (From admission, onward)     Ordered        12/15/22 1405  Place in Observation  Once                      Orders Placed This Encounter   Procedures   • Place in Observation     Standing Status:   Standing     Number of Occurrences:   1     Order Specific Question:   Level of Care     Answer:   Med Surg [16]     ED Arrival Information     Expected   -    Arrival   12/15/2022 12:49    Acuity   Urgent            Means of arrival   Walk-In    Escorted by   Spouse    Service   OB/GYN    Admission type   Emergency            Arrival complaint   HIGH BLOOD PRESSURE (168/98)           Chief Complaint   Patient presents with   • Hypertension     Pt to ED from post partum visit, sent to ED for eval after BP reading of 168/98       Initial Presentation: 29 y o  female presented to ED from home as observation for severe preeclampsia  Patient 6 days PP with gestational hypertension now with new onset of your hypertension  Patient c/o headache rating 2 out of 10  She denies vision changes chest pain, shortness of breath, right upper quadrant/epigastric pain, increased swelling in her legs  In the office and ED severely elevated blood pressures  Plan IV MSGO2  IV labetalol prn Procardia started and supportive care      OB Triage Vitals   Temperature Pulse Respirations Blood Pressure SpO2   12/15/22 1257 12/15/22 1257 12/15/22 1257 12/15/22 1257 12/15/22 1257   98 2 °F (36 8 °C) 76 18 (!) 174/95 99 %      Temp Source Heart Rate Source Patient Position - Orthostatic VS BP Location FiO2 (%)   12/15/22 1608 12/15/22 1352 12/15/22 1352 12/15/22 1352 --   Oral Monitor Sitting Right arm       Pain Score       12/15/22 1534       No Pain          Wt Readings from Last 1 Encounters:   12/15/22 91 7 kg (202 lb 3 2 oz)     Additional Vital Signs:   Date/Time Temp Pulse Resp BP MAP (mmHg) SpO2 O2 Device Cardiac (WDL) Patient Position - Orthostatic VS   12/16/22 0603 97 9 °F (36 6 °C) 98 18 138/78 -- 99 % None (Room air) -- Sitting   12/16/22 0350 97 6 °F (36 4 °C) 80 18 148/81  -- 99 % None (Room air) -- Standing   BP: MD aware at 12/16/22 0350   12/16/22 0200 98 1 °F (36 7 °C) 82 18 133/79 -- 98 % None (Room air) -- Lying   12/16/22 0000 97 6 °F (36 4 °C) 82 18 143/73 -- 96 % None (Room air) -- Lying   12/15/22 2226 97 7 °F (36 5 °C) 78 18 154/83  -- 99 % -- -- --   BP: rn aware at 12/15/22 2226   12/15/22 2135 -- 84 18 162/82 114 98 % None (Room air) -- Sitting   12/15/22 2031 -- 81 18 133/74 98 98 % None (Room air) -- Sitting   12/15/22 1920 97 9 °F (36 6 °C) 73 18 140/77 -- 98 % None (Room air) WDL --   12/15/22 1800 -- 77 -- 140/75 -- -- -- -- --   12/15/22 1730 -- 76 -- 144/78 -- -- -- -- --   12/15/22 1715 -- 78 -- 145/84 -- -- -- -- --   12/15/22 1700 -- 67 -- 141/72 -- -- -- -- --   12/15/22 1645 -- 87 -- 152/82 -- -- -- -- --   12/15/22 1630 -- 76 -- 163/79 -- -- -- WDL --   12/15/22 1608 98 °F (36 7 °C) 74 16 134/72 -- 100 % None (Room air) -- Lying   12/15/22 1530 -- 76 18 128/68 92 97 % None (Room air) -- Sitting   12/15/22 1454 -- 67 16 118/72 -- 99 % None (Room air) -- Sitting   12/15/22 1453 -- 64 16 143/77 -- 98 % None (Room air) -- Sitting   12/15/22 1352 -- 81 16 166/88 -- 99 % -- --      Pertinent Labs/Diagnostic Test Results:   No orders to display         Results from last 7 days   Lab Units 12/15/22  2127 12/15/22  1408   WBC Thousand/uL 10 24* 9 85   HEMOGLOBIN g/dL 13 0 13 1   HEMATOCRIT % 38 6 38 5   PLATELETS Thousands/uL 332 316   NEUTROS ABS Thousands/µL  --  6 71         Results from last 7 days   Lab Units 12/15/22  2127 12/15/22  1408   SODIUM mmol/L 141 139   POTASSIUM mmol/L 3 4* 3 3*   CHLORIDE mmol/L 107 107   CO2 mmol/L 27 24   ANION GAP mmol/L 7 8   BUN mg/dL 7 9   CREATININE mg/dL 0 56* 0 59*   EGFR ml/min/1 73sq m 125 123   CALCIUM mg/dL 8 5 9 0   MAGNESIUM mg/dL 4 8*  --      Results from last 7 days   Lab Units 12/15/22  2127 12/15/22  1408   AST U/L 19 19   ALT U/L 19 17   ALK PHOS U/L 150* 149*   TOTAL PROTEIN g/dL 6 4 6 3*   ALBUMIN g/dL 3 4* 3 3*   TOTAL BILIRUBIN mg/dL 0 53 0 53     Results from last 7 days   Lab Units 12/16/22  0705 12/16/22  0138 12/15/22  1842 12/11/22  0902 12/10/22  2135 12/10/22  1833 12/10/22  1354 12/10/22  0754 12/09/22  1927 12/09/22  1455 12/09/22  1328 12/09/22  1100   POC GLUCOSE mg/dl 96 81 72 66 133 100 72 71 65 112 80 65     Results from last 7 days   Lab Units 12/15/22  2127 12/15/22  1408   GLUCOSE RANDOM mg/dL 114 121     Results from last 7 days   Lab Units 12/15/22  1413   CREATININE UR mg/dL 20 4   PROTEIN UR mg/dL 10   PROT/CREAT RATIO UR  0 49*       ED Treatment:   Medication Administration from 12/15/2022 1249 to 12/15/2022 1605       Date/Time Order Dose Route Action     12/15/2022 1432 EST labetalol (NORMODYNE) injection 20 mg 20 mg Intravenous Given     12/15/2022 1445 EST lactated ringers infusion 50 mL/hr Intravenous New Bag     12/15/2022 1446 EST magnesium sulfate 4 g/100 mL IVPB (premix) 4 g 4 g Intravenous New Bag     12/15/2022 1541 EST magnesium sulfate 2 g/50 mL IVPB (premix) 2 g 2 g Intravenous New Bag     12/15/2022 1534 EST ibuprofen (MOTRIN) tablet 600 mg 600 mg Oral Given        Past Medical History:   Diagnosis Date   • 16 weeks gestation of pregnancy 7/14/2022   • Annual physical exam 7/14/2022   • Asthma    • BMI 29 0-29 9,adult 5/18/2021   • BMI 30 0-30 9,adult 5/6/2021   • BMI 31 0-31 9,adult 8/24/2021   • BMI 33 0-33 9,adult 12/14/2021   • Constipation 12/14/2021   • COVID-19 5/6/2021   • Diabetes mellitus ([facility] )    • DM2 (diabetes mellitus, type 2) ([facility] 75 )    • Elevated blood pressure reading in office without diagnosis of hypertension 3/31/2022   • Elevated cortisol level    • Hand pain, right    • High triglycerides    • Hypertriglyceridemia 5/2/2021   • Microalbuminuria 5/18/2021   • Mild intermittent asthma without complication 1/3/8699   • Obesity    • Rhinitis, allergic 5/6/2021   • Sinobronchitis    • Skin rash 7/14/2022   • Type 2 diabetes mellitus ([facility] )      Present on Admission:  **None**      Admitting Diagnosis: Hypertension [I10]  Postpartum hypertension [O16 5]  Age/Sex: 29 y o  female  Admission Orders:  VS, I/O lung assessments and deep tendon reflexes q 2 hrs while on MGSO 4  Blood sugars ac and hs        Scheduled Medications:  docusate sodium, 100 mg, Oral, BID  ibuprofen, 600 mg, Oral, Q6H  metFORMIN, 2,000 mg, Oral, Daily With Dinner  NIFEdipine, 30 mg, Oral, Daily      Continuous IV Infusions:  lactated ringers, 50 mL/hr, Intravenous, Continuous  magnesium sulfate, 2 g/hr, Intravenous, Continuous      PRN Meds:  acetaminophen, 975 mg, Oral, Q6H PRN  benzocaine-menthol-lanolin-aloe, 1 application, Topical, G0J PRN  calcium carbonate, 1,000 mg, Oral, Daily PRN  calcium gluconate, 1 g, Intravenous, Once PRN  hydrocortisone, 1 application, Topical, Daily PRN  loratadine, 10 mg, Oral, Daily PRN  ondansetron, 4 mg, Intravenous, Q8H PRN  witch hazel-glycerin, 1 pad, Topical, Q4H PRN        None    Network Utilization Review Department  ATTENTION: Please call with any questions or concerns to [phone] and carefully listen to the prompts so that you are directed to the right person  All voicemails are confidential   Dmitriy Grijalva all requests for admission clinical reviews, approved or denied determinations and any other requests to dedicated fax number below belonging to the campus where the patient is receiving treatment   List of dedicated fax numbers for the Facilities:  [address] DENIALS (Administrative/Medical Necessity) [phone]   [address] (Maternity/NICU/Pediatrics) [phone]   [address] Pedro [phone]   Jessica Jj  [phone]   [address] [place] [address], [address] [address] Bhavani Patel [facility] [address] [facility] 310 [facility] 134 455 [facility] [phone]

## 2022-12-16 NOTE — PLAN OF CARE
Problem: Potential for Falls  Goal: Patient will remain free of falls  Description: INTERVENTIONS:  - Educate patient/family on patient safety including physical limitations  - Instruct patient to call for assistance with activity   - Consult OT/PT to assist with strengthening/mobility   - Keep Call bell within reach  - Keep bed low and locked with side rails adjusted as appropriate  - Keep care items and personal belongings within reach  - Initiate and maintain comfort rounds  Outcome: Progressing     Problem: PAIN - ADULT  Goal: Verbalizes/displays adequate comfort level or baseline comfort level  Description: Interventions:  - Encourage patient to monitor pain and request assistance  - Assess pain using appropriate pain scale  - Administer analgesics based on type and severity of pain and evaluate response  - Implement non-pharmacological measures as appropriate and evaluate response  - Consider cultural and social influences on pain and pain management  - Notify physician/advanced practitioner if interventions unsuccessful or patient reports new pain  Outcome: Progressing     Problem: SAFETY ADULT  Goal: Patient will remain free of falls  Description: INTERVENTIONS:  - Educate patient/family on patient safety including physical limitations  - Instruct patient to call for assistance with activity   - Consult OT/PT to assist with strengthening/mobility   - Keep Call bell within reach  - Keep bed low and locked with side rails adjusted as appropriate  - Keep care items and personal belongings within reach  - Initiate and maintain comfort rounds  Outcome: Progressing  Goal: Maintain or return to baseline ADL function  Description: INTERVENTIONS:  -  Assess patient's ability to carry out ADLs; assess patient's baseline for ADL function and identify physical deficits which impact ability to perform ADLs (bathing, care of mouth/teeth, toileting, grooming, dressing, etc )  - Assess/evaluate cause of self-care deficits - Assess range of motion  - Assess patient's mobility; develop plan if impaired  - Assess patient's need for assistive devices and provide as appropriate  - Encourage maximum independence but intervene and supervise when necessary  - Involve family in performance of ADLs  - Assess for home care needs following discharge   - Consider OT consult to assist with ADL evaluation and planning for discharge  - Provide patient education as appropriate  Outcome: Progressing  Goal: Maintains/Returns to pre admission functional level  Description: INTERVENTIONS:  - Perform BMAT or MOVE assessment daily    - Set and communicate daily mobility goal to care team and patient/family/caregiver  - Collaborate with rehabilitation services on mobility goals if consulted  - Out of bed for toileting  - Record patient progress and toleration of activity level   Outcome: Progressing     Problem: DISCHARGE PLANNING  Goal: Discharge to home or other facility with appropriate resources  Description: INTERVENTIONS:  - Identify barriers to discharge w/patient and caregiver  - Arrange for needed discharge resources and transportation as appropriate  - Identify discharge learning needs (meds, wound care, etc )  - Arrange for interpretive services to assist at discharge as needed  - Refer to Case Management Department for coordinating discharge planning if the patient needs post-hospital services based on physician/advanced practitioner order or complex needs related to functional status, cognitive ability, or social support system  Outcome: Progressing     Problem: Knowledge Deficit  Goal: Patient/family/caregiver demonstrates understanding of disease process, treatment plan, medications, and discharge instructions  Description: Complete learning assessment and assess knowledge base    Interventions:  - Provide teaching at level of understanding  - Provide teaching via preferred learning methods  Outcome: Progressing

## 2022-12-16 NOTE — PROGRESS NOTES
Progress Note - OB/GYN  Mera Bey 29 y o  female MRN: [id number]  Unit/Bed#:  303-01 Encounter: [id number]    Assessment and [address] is a patient of: [facility]  She is PPD# 7 s/p  spontaneous vaginal delivery  Readmitted in the setting of preeclampsia with severe features with severe range pressures  Elevated but non-severe this morning  Asymptomatic  Postpartum state  Assessment & Plan  Lochia WNL   Recovering well   Appropriate bowel and bladder function   Pain well controlled   Breastfeeding/pumping   Motrin/tylenol for pain   Perineal care      * Preeclampsia, severe  Assessment & Plan  Patient dx with preeclampsia w severe, by SRBP   Denies vision changes, chest pain, shortness of breath, RUQ/epigastric pain   Reports headache 2/10- plan for tylenol   She has required 20mg IV labetalol for acute antihypertensive treatment   Magnesium 6g bolus followed by 2g/hr, started 12/15  IVF 50cc/hr   Procardia XL 30mg qhs started 99/05 pm    Systolic (09SOR), ROWAN:567 , Min:118 , TJQ:489     Diastolic (61UST), XPF:92, Min:68, Max:98        Lab Results   Component Value Date/Time    HGB 13 0 12/15/2022 09:27 PM    HGB 13 1 12/15/2022 02:08 PM     12/15/2022 09:27 PM     12/15/2022 02:08 PM    AST 19 12/15/2022 09:27 PM    AST 19 12/15/2022 02:08 PM    ALT 19 12/15/2022 09:27 PM    ALT 17 12/15/2022 02:08 PM    CREATININE 0 56 (L) 12/15/2022 09:27 PM    CREATININE 0 59 (L) 12/15/2022 02:08 PM             Disposition    - Continue inpatient management      Subjective/Objective     Chief Complaint: Postpartum State     Subjective:    Leah Leung is PPD/POD#7 s/p  spontaneous vaginal delivery  She has no current complaints  Pain is well controlled  Patient is currently voiding  She is ambulating  Patient is currently passing flatus and has had bowel movement  She is tolerating PO, and denies nausea or vomitting   Patient denies fever, chills, chest pain, shortness of breath, or calf tenderness  Lochia is minimal  She is  Breastfeeding  She is recovering well and is stable  Continuing magnesium gtt and pressure trends  Vitals:   /78 (BP Location: Left arm)   Pulse 98   Temp 97 9 °F (36 6 °C) (Oral)   Resp 18   LMP 03/10/2022   SpO2 99%   Breastfeeding Unknown       Intake/Output Summary (Last 24 hours) at 12/16/2022 0743  Last data filed at 12/16/2022 0603  Gross per 24 hour   Intake 2519 99 ml   Output 2000 ml   Net 519 99 ml       Invasive Devices     Peripheral Intravenous Line  Duration           Peripheral IV 12/15/22 Dorsal (posterior); Right Hand <1 day                Physical Exam  Vitals and nursing note reviewed  Constitutional:       General: She is not in acute distress  HENT:      Head: Normocephalic  Right Ear: External ear normal       Left Ear: External ear normal    Eyes:      General: No scleral icterus  Right eye: No discharge  Left eye: No discharge  Conjunctiva/sclera: Conjunctivae normal    Cardiovascular:      Rate and Rhythm: Normal rate and regular rhythm  Pulses: Normal pulses  Heart sounds: Normal heart sounds  Pulmonary:      Effort: Pulmonary effort is normal  No respiratory distress  Breath sounds: Normal breath sounds  Abdominal:      General: Abdomen is flat  There is no distension  Palpations: Abdomen is soft  Tenderness: There is no abdominal tenderness  There is no guarding  Musculoskeletal:         General: No swelling or tenderness  Normal range of motion  Cervical back: Normal range of motion  Right lower leg: No edema  Left lower leg: No edema  Skin:     General: Skin is warm and dry  Capillary Refill: Capillary refill takes less than 2 seconds  Neurological:      Mental Status: She is alert and oriented to person, place, and time  Mental status is at baseline     Psychiatric:         Mood and Affect: Mood normal  Behavior: Behavior normal              Labs:     Hemoglobin   Date Value Ref Range Status   12/15/2022 13 0 11 5 - 15 4 g/dL Final   12/15/2022 13 1 11 5 - 15 4 g/dL Final     WBC   Date Value Ref Range Status   12/15/2022 10 24 (H) 4 31 - 10 16 Thousand/uL Final   12/15/2022 9 85 4 31 - 10 16 Thousand/uL Final     Platelets   Date Value Ref Range Status   12/15/2022 332 149 - 390 Thousands/uL Final   12/15/2022 316 149 - 390 Thousands/uL Final     Creatinine   Date Value Ref Range Status   12/15/2022 0 56 (L) 0 60 - 1 30 mg/dL Final     Comment:     Standardized to IDMS reference method   12/15/2022 0 59 (L) 0 60 - 1 30 mg/dL Final     Comment:     Standardized to IDMS reference method     AST   Date Value Ref Range Status   12/15/2022 19 13 - 39 U/L Final     Comment:     Specimen collection should occur prior to Sulfasalazine administration due to the potential for falsely depressed results  12/15/2022 19 13 - 39 U/L Final     Comment:     Specimen collection should occur prior to Sulfasalazine administration due to the potential for falsely depressed results  ALT   Date Value Ref Range Status   12/15/2022 19 7 - 52 U/L Final     Comment:     Specimen collection should occur prior to Sulfasalazine administration due to the potential for falsely depressed results  12/15/2022 17 7 - 52 U/L Final     Comment:     Specimen collection should occur prior to Sulfasalazine administration due to the potential for falsely depressed results             Gianna Juarez MD  12/16/2022  7:43 AM

## 2022-12-16 NOTE — UTILIZATION REVIEW
NOTIFICATION OF OBSERVATION ADMISSION   AUTHORIZATION REQUEST   SERVICING FACILITY:   [facility] - [facility]D, , NICU & [address] [facility] [place], [address] [place] [address]  Tax ID: [id number]  NPI: [id number]   ATTENDING PROVIDER:  Attending Name and NPI#: Naif Infante Md [[id number]]  Address[facility] [address] [place] [address] [facility] [place], [address] [place] [address]  Phone: [phone]     ADMISSION INFORMATION:  Place of Service: On [address] Code: 22 CPT Code:   Admitting Diagnosis Code/Description:  Hypertension [I10]  Postpartum hypertension [O16 5]  Observation Admission Date/Time: 12/15/2022  Discharge Date/Time: No discharge date for patient encounter  UTILIZATION REVIEW CONTACT:  Manjeet Santacruz Utilization   Network Utilization Review Department  Phone: [phone]; Fax [phone]  Email: [email or web address]  Contact for approvals/pending authorizations, clinical reviews, and discharge  PHYSICIAN ADVISORY SERVICES:  Medical Necessity Denial & Vzkd-xl-Qpey Review  Phone: [phone]  Fax: [phone]  Email: [email or web address]

## 2022-12-17 VITALS
OXYGEN SATURATION: 98 % | SYSTOLIC BLOOD PRESSURE: 133 MMHG | RESPIRATION RATE: 17 BRPM | HEART RATE: 87 BPM | DIASTOLIC BLOOD PRESSURE: 82 MMHG | TEMPERATURE: 97.6 F

## 2022-12-17 LAB — GLUCOSE SERPL-MCNC: 80 MG/DL (ref 65–140)

## 2022-12-17 RX ORDER — NIFEDIPINE 30 MG/1
30 TABLET, EXTENDED RELEASE ORAL DAILY
Qty: 30 TABLET | Refills: 3 | Status: SHIPPED | OUTPATIENT
Start: 2022-12-17

## 2022-12-17 RX ORDER — DOCUSATE SODIUM 100 MG/1
100 CAPSULE, LIQUID FILLED ORAL 2 TIMES DAILY PRN
Refills: 0
Start: 2022-12-17

## 2022-12-17 RX ADMIN — NIFEDIPINE 30 MG: 30 TABLET, FILM COATED, EXTENDED RELEASE ORAL at 08:03

## 2022-12-17 RX ADMIN — IBUPROFEN 600 MG: 600 TABLET ORAL at 08:03

## 2022-12-17 NOTE — DISCHARGE SUMMARY
Discharge Summary - OB/GYN   Kristopher Bey 29 y o  female MRN: [id number]  Unit/Bed#: -01 Encounter: [id number]      Admission Date: 12/15/2022     Discharge Date: 22    Admitting Diagnosis:   1  Pregnancy at 37w6d  2  Preeclampsia w SF  3  Type 2 DM    Discharge Diagnosis:   Same, delivered    Procedures: spontaneous vaginal delivery    Delivering Attending: Yoon Jackson MD  Discharge Attending: Dr Abdulaziz Nicole:   Marleen Hernandez is a 29 y o   8 days postpartum from uncomplicated  who presents after being sent from the office with severely elevated blood pressures  Patient has known gestational hypertension  This is the first instance of severely elevated blood pressures  On presentation to the ED she had persistent severely elevated blood pressures  She denies vision changes chest pain, shortness of breath, right upper quadrant/epigastric pain  She does report increased swelling in her legs  She reports minimal lochia  She denies fever, chills, abnormal foul-smelling discharge, dysuria  On day of discharge, she was ambulating and able to reasonably perform all ADLs  She was voiding and had appropriate bowel function  Pain was well controlled  She was discharged home on post-partum day #8 without complications  Patient was instructed to follow up with her OB as an outpatient and was given appropriate warnings to call provider if she develops signs of infection or uncontrolled pain  Complications: none apparent    Condition at discharge: good     Discharge instructions/Information to patient and family:   See after visit summary for information provided to patient and family  Provisions for Follow-Up Care:  See after visit summary for information related to follow-up care and any pertinent home health orders  Disposition: Home    Planned Readmission: No    Discharge Medications:   For a complete list of the patient's medications, please refer to her med rec

## 2022-12-17 NOTE — ASSESSMENT & PLAN NOTE
Lab Results   Component Value Date    HGBA1C 5 3 11/29/2022       Recent Labs     12/16/22  0705 12/16/22  1325 12/16/22  1831 12/16/22  2140   POCGLU 96 103 78 176*       Blood Sugar Average: Last 72 hrs:  (P) 101     On metformin, diabetic diet  If BG persistently elevated, consider endo consult

## 2022-12-17 NOTE — PLAN OF CARE
Problem: Potential for Falls  Goal: Patient will remain free of falls  Description: INTERVENTIONS:  - Educate patient/family on patient safety including physical limitations  - Instruct patient to call for assistance with activity   - Consult OT/PT to assist with strengthening/mobility   - Keep Call bell within reach  - Keep bed low and locked with side rails adjusted as appropriate  - Keep care items and personal belongings within reach  - Initiate and maintain comfort rounds  - Make Fall Risk Sign visible to staff  - Offer Toileting every Hours, in advance of need  - Initiate/Maintain alarm  - Obtain necessary fall risk management equipment:   - Apply yellow socks and bracelet for high fall risk patients  - Consider moving patient to room near nurses station  12/16/2022 1957 by Araceli Coulter RN  Outcome: Progressing  12/16/2022 1957 by Araceli Coulter RN  Outcome: Progressing  12/16/2022 1957 by Araceli Coulter RN  Outcome: Progressing     Problem: PAIN - ADULT  Goal: Verbalizes/displays adequate comfort level or baseline comfort level  Description: Interventions:  - Encourage patient to monitor pain and request assistance  - Assess pain using appropriate pain scale  - Administer analgesics based on type and severity of pain and evaluate response  - Implement non-pharmacological measures as appropriate and evaluate response  - Consider cultural and social influences on pain and pain management  - Notify physician/advanced practitioner if interventions unsuccessful or patient reports new pain  12/16/2022 1957 by Araceli Coulter RN  Outcome: Progressing  12/16/2022 1957 by Araceli Coulter RN  Outcome: Progressing  12/16/2022 1957 by Araceli Coulter RN  Outcome: Progressing     Problem: SAFETY ADULT  Goal: Patient will remain free of falls  Description: INTERVENTIONS:  - Educate patient/family on patient safety including physical limitations  - Instruct patient to call for assistance with activity   - Consult OT/PT to assist with strengthening/mobility   - Keep Call bell within reach  - Keep bed low and locked with side rails adjusted as appropriate  - Keep care items and personal belongings within reach  - Initiate and maintain comfort rounds  - Make Fall Risk Sign visible to staff  - Offer Toileting every Hours, in advance of need  - Initiate/Maintain alarm  - Obtain necessary fall risk management equipment:   - Apply yellow socks and bracelet for high fall risk patients  - Consider moving patient to room near nurses station  12/16/2022 1957 by Stephanie Gray RN  Outcome: Progressing  12/16/2022 1957 by Stephanie Gray RN  Outcome: Progressing  12/16/2022 1957 by Stephanie Gray RN  Outcome: Progressing  Goal: Maintain or return to baseline ADL function  Description: INTERVENTIONS:  -  Assess patient's ability to carry out ADLs; assess patient's baseline for ADL function and identify physical deficits which impact ability to perform ADLs (bathing, care of mouth/teeth, toileting, grooming, dressing, etc )  - Assess/evaluate cause of self-care deficits   - Assess range of motion  - Assess patient's mobility; develop plan if impaired  - Assess patient's need for assistive devices and provide as appropriate  - Encourage maximum independence but intervene and supervise when necessary  - Involve family in performance of ADLs  - Assess for home care needs following discharge   - Consider OT consult to assist with ADL evaluation and planning for discharge  - Provide patient education as appropriate  12/16/2022 1957 by Stephanie Gray RN  Outcome: Progressing  12/16/2022 1957 by Stephanie Gray RN  Outcome: Progressing  12/16/2022 1957 by Stephanie Gray RN  Outcome: Progressing  Goal: Maintains/Returns to pre admission functional level  Description: INTERVENTIONS:  - Perform BMAT or MOVE assessment daily    - Set and communicate daily mobility goal to care team and patient/family/caregiver     - Collaborate with rehabilitation services on mobility goals if consulted  - Perform Range of Motion times a day  - Reposition patient every  hours  - Dangle patient times a day  - Stand patient times a day  - Ambulate patient  times a day  - Out of bed to chair times a day   - Out of bed for meals  times a day  - Out of bed for toileting  - Record patient progress and toleration of activity level   12/16/2022 1957 by Chidi Aparicio RN  Outcome: Progressing  12/16/2022 1957 by Chidi Aparicio RN  Outcome: Progressing  12/16/2022 1957 by Chidi Aparicio RN  Outcome: Progressing     Problem: DISCHARGE PLANNING  Goal: Discharge to home or other facility with appropriate resources  Description: INTERVENTIONS:  - Identify barriers to discharge w/patient and caregiver  - Arrange for needed discharge resources and transportation as appropriate  - Identify discharge learning needs (meds, wound care, etc )  - Arrange for interpretive services to assist at discharge as needed  - Refer to Case Management Department for coordinating discharge planning if the patient needs post-hospital services based on physician/advanced practitioner order or complex needs related to functional status, cognitive ability, or social support system  12/16/2022 1957 by Chidi Aparicio RN  Outcome: Progressing  12/16/2022 1957 by Chidi Aparicio RN  Outcome: Progressing  12/16/2022 1957 by Chidi Aparicio RN  Outcome: Progressing     Problem: Knowledge Deficit  Goal: Patient/family/caregiver demonstrates understanding of disease process, treatment plan, medications, and discharge instructions  Description: Complete learning assessment and assess knowledge base    Interventions:  - Provide teaching at level of understanding  - Provide teaching via preferred learning methods  12/16/2022 1957 by Chidi Aparicio RN  Outcome: Progressing  12/16/2022 1957 by Chidi Aparicio RN  Outcome: Progressing  12/16/2022 1957 by Chidi Aparicio RN  Outcome: Progressing

## 2022-12-17 NOTE — PROGRESS NOTES
Progress Note - OB/GYN  Kristopher Bey 29 y o  female MRN: [id number]  Unit/Bed#:  303-01 Encounter: [id number]    Assessment and [address] Ale[address] Hylton is a patient of: Manjit Hernandez  She is PPD# 8 s/p  spontaneous vaginal delivery  Readmitted in the setting of preeclampsia with severe features with severe range pressures  Normotensive for the past 24 hours  Postpartum state  Assessment & Plan  Lochia WNL   Recovering well   Appropriate bowel and bladder function   Pain well controlled   Breastfeeding/pumping   Motrin/tylenol for pain   Perineal care      Type 2 diabetes mellitus [facility])  Assessment & Plan  Lab Results   Component Value Date    HGBA1C 5 3 11/29/2022       Recent Labs     12/16/22  0705 12/16/22  1325 12/16/22  1831 12/16/22  2140   POCGLU 96 103 78 176*       Blood Sugar Average: Last 72 hrs:  (P) 101     On metformin, diabetic diet  If BG persistently elevated, consider endo consult    * Preeclampsia, severe  Assessment & Plan  Patient dx with preeclampsia w severe, by SRBP   Denies HA, vision changes, chest pain, shortness of breath, RUQ/epigastric pain   She has required 20mg IV labetalol for acute antihypertensive treatment   S/p 24h Magnesium  Procardia XL 30mg qhs started 94/19 pm    Systolic (83USU), LRP:577 , Min:112 , GFJ:642     Diastolic (07RSS), LRC:34, Min:55, Max:86        Lab Results   Component Value Date/Time    HGB 14 0 12/16/2022 10:16 AM    HGB 13 0 12/15/2022 09:27 PM     12/16/2022 10:16 AM     12/15/2022 09:27 PM    AST 19 12/16/2022 10:16 AM    AST 19 12/15/2022 09:27 PM    ALT 20 12/16/2022 10:16 AM    ALT 19 12/15/2022 09:27 PM    CREATININE 0 46 (L) 12/16/2022 10:16 AM    CREATININE 0 56 (L) 12/15/2022 09:27 PM           Disposition    - Continue inpatient management      Subjective/Objective     Chief Complaint: Postpartum State     Subjective:    Vermell Cord is PPD#8 s/p  spontaneous vaginal delivery   She has no current complaints  Pain is well controlled  Patient is currently voiding  She is ambulating  Patient is currently passing flatus and has had bowel movement  She is tolerating PO, and denies nausea or vomitting  Patient denies fever, chills, chest pain, shortness of breath, or calf tenderness  Lochia is minimal  She is  Breastfeeding  She is recovering well and is stable  Continuing magnesium gtt and pressure trends  Vitals:   /67   Pulse 92   Temp 97 6 °F (36 4 °C) (Oral)   Resp 18   LMP 03/10/2022   SpO2 97%   Breastfeeding Yes       Intake/Output Summary (Last 24 hours) at 12/17/2022 0657  Last data filed at 12/16/2022 0801  Gross per 24 hour   Intake 480 ml   Output --   Net 480 ml       Invasive Devices     Peripheral Intravenous Line  Duration           Peripheral IV 12/15/22 Dorsal (posterior); Right Hand 1 day                Physical Exam  Vitals and nursing note reviewed  Constitutional:       General: She is not in acute distress  HENT:      Head: Normocephalic  Right Ear: External ear normal       Left Ear: External ear normal    Eyes:      General: No scleral icterus  Right eye: No discharge  Left eye: No discharge  Conjunctiva/sclera: Conjunctivae normal    Cardiovascular:      Rate and Rhythm: Normal rate and regular rhythm  Pulses: Normal pulses  Heart sounds: Normal heart sounds  Pulmonary:      Effort: Pulmonary effort is normal  No respiratory distress  Breath sounds: Normal breath sounds  Abdominal:      General: Abdomen is flat  There is no distension  Palpations: Abdomen is soft  Tenderness: There is no abdominal tenderness  There is no guarding  Musculoskeletal:         General: No swelling or tenderness  Normal range of motion  Cervical back: Normal range of motion  Right lower leg: No edema  Left lower leg: No edema  Skin:     General: Skin is warm and dry        Capillary Refill: Capillary refill takes less than 2 seconds  Neurological:      Mental Status: She is alert and oriented to person, place, and time  Mental status is at baseline  Psychiatric:         Mood and Affect: Mood normal          Behavior: Behavior normal              Labs:     Hemoglobin   Date Value Ref Range Status   12/16/2022 14 0 11 5 - 15 4 g/dL Final   12/15/2022 13 0 11 5 - 15 4 g/dL Final     WBC   Date Value Ref Range Status   12/16/2022 9 78 4 31 - 10 16 Thousand/uL Final   12/15/2022 10 24 (H) 4 31 - 10 16 Thousand/uL Final     Platelets   Date Value Ref Range Status   12/16/2022 360 149 - 390 Thousands/uL Final   12/15/2022 332 149 - 390 Thousands/uL Final     Creatinine   Date Value Ref Range Status   12/16/2022 0 46 (L) 0 60 - 1 30 mg/dL Final     Comment:     Standardized to IDMS reference method   12/15/2022 0 56 (L) 0 60 - 1 30 mg/dL Final     Comment:     Standardized to IDMS reference method     AST   Date Value Ref Range Status   12/16/2022 19 13 - 39 U/L Final     Comment:     Specimen collection should occur prior to Sulfasalazine administration due to the potential for falsely depressed results  12/15/2022 19 13 - 39 U/L Final     Comment:     Specimen collection should occur prior to Sulfasalazine administration due to the potential for falsely depressed results  ALT   Date Value Ref Range Status   12/16/2022 20 7 - 52 U/L Final     Comment:     Specimen collection should occur prior to Sulfasalazine administration due to the potential for falsely depressed results  12/15/2022 19 7 - 52 U/L Final     Comment:     Specimen collection should occur prior to Sulfasalazine administration due to the potential for falsely depressed results             Quinn Cordoba MD  12/17/2022  6:57 AM

## 2022-12-20 ENCOUNTER — POSTPARTUM VISIT (OUTPATIENT)
Dept: OBGYN CLINIC | Facility: MEDICAL CENTER | Age: 32
End: 2022-12-20

## 2022-12-20 VITALS
SYSTOLIC BLOOD PRESSURE: 120 MMHG | WEIGHT: 191.8 LBS | BODY MASS INDEX: 30.1 KG/M2 | DIASTOLIC BLOOD PRESSURE: 80 MMHG | HEIGHT: 67 IN

## 2022-12-20 DIAGNOSIS — O14.10 SEVERE PRE-ECLAMPSIA, ANTEPARTUM: Primary | ICD-10-CM

## 2022-12-20 NOTE — PROGRESS NOTES
Assessment/Plan:      Diagnoses and all orders for this visit:    Severe pre-eclampsia, antepartum  Continue procardia  Call w/ symptoms  Discussed ASA in future pregnancies, increased risk for pre-e   (spontaneous vaginal delivery)  Discuss contraception at next visit in 2 wks          Subjective:     Patient ID: Timothy Nation is a 29 y o  female  28 yo  PPD#11 from  presents for BP check  She was readmitted for postpartum pre-e w/ SF  She received magnesium for 24 hours and was started on procardia  She feels well today  Denies HA, vision changes, CP, SOB  Reports swelling has decreased  Sleeping 4 hour stretches now and then  No mood concerns  PPD = 2  Review of Systems   All other systems reviewed and are negative  Objective:    Vitals:    22 1258   BP: 120/80          Physical Exam  Vitals reviewed  Pulmonary:      Effort: Pulmonary effort is normal    Neurological:      Mental Status: She is alert and oriented to person, place, and time     Psychiatric:         Behavior: Behavior normal

## 2023-01-03 NOTE — PROGRESS NOTES
Suzie Bey  1990    S:  The patient is a [place] y o  who presents for a postpartum visit  She is 4 weeks postpartum following a  delivery  The delivery was at 40 gestational weeks  She is s/p IOL at 40 for gHTN  I have fully reviewed the prenatal and intrapartum course  Complications include: periurethral laceration  Postpartum course has been complicated by a readmission about 1 week PP for postpartum PEC with severe features  She was started on Procardia at that time  She is still taking Procardia daily  Does not take BP at home but denies symptoms of hypotension  Denies HA, blurred vision, swelling    Baby "Albert Porras" is doing well  Baby is feeding by bottle with pumped breast milk  Bleeding slowed minimal spotting  Bowel function is normal  Bladder function is normal  Patient has not been sexually active  She denies postpartum blues/depression  Wortham Scale= 1    Last Pap: 2022, negative     We discussed contraceptive options in detail including birth control pills, patches, NuvaRing, DepoProvera, Mirena/Kyleena IUD, Nexplanon in detail  At this point she would like to plan for Mirena IUD insertion    Review of Systems   Respiratory: Negative  Cardiovascular: Negative  Gastrointestinal: Negative for constipation and diarrhea  Genitourinary: Negative for difficulty urinating, pelvic pain, vaginal bleeding, vaginal discharge, itching or odor        Past Medical History:   Diagnosis Date   • 16 weeks gestation of pregnancy 2022   • Annual physical exam 2022   • Asthma    • BMI 29 0-29 9,adult 2021   • BMI 30 0-30 9,adult 2021   • BMI 31 0-31 9,adult 2021   • BMI 33 0-33 9,adult 2021   • Constipation 2021   • COVID-19 2021   • Diabetes mellitus ([facility] [address] )    • DM2 (diabetes mellitus, type 2) ([facility] [address] )    • Elevated blood pressure reading in office without diagnosis of hypertension 3/31/2022   • Elevated cortisol level    • Hand pain, right • High triglycerides    • Hypertriglyceridemia 5/2/2021   • Microalbuminuria 5/18/2021   • Mild intermittent asthma without complication 1/6/8806   • Obesity    • Rhinitis, allergic 5/6/2021   • Sinobronchitis    • Skin rash 7/14/2022   • Type 2 diabetes mellitus ([address] )      Family History   Problem Relation Age of Onset   • Diabetes Mother    • Hypertension Mother    • COPD Father    • Fibromyalgia Father    • No Known Problems Brother    • Heart disease Maternal Grandmother    • Stroke Maternal Grandmother    • Heart attack Maternal Grandmother    • Heart disease Maternal Grandfather    • Heart attack Maternal Grandfather    • No Known Problems Paternal Grandmother    • Prostate cancer Paternal Grandfather      Social History     Socioeconomic History   • Marital status: /Civil Union     Spouse name: Not on file   • Number of children: Not on file   • Years of education: Not on file   • Highest education level: Not on file   Occupational History   • Not on file   Tobacco Use   • Smoking status: Never   • Smokeless tobacco: Never   Vaping Use   • Vaping Use: Never used   Substance and Sexual Activity   • Alcohol use: Yes     Comment: occasionally   • Drug use: No   • Sexual activity: Yes     Partners: Male     Birth control/protection: OCP   Other Topics Concern   • Not on file   Social History Narrative    Lives with     Annual eye exam: Advise to see ophthalmology    Pap smear by her gyn    - [facility]     Social Determinants of Health     Financial Resource Strain: Not on file   Food Insecurity: Not on file   Transportation Needs: Not on file   Physical Activity: Not on file   Stress: Not on file   Social Connections: Not on file   Intimate Partner Violence: Not on file   Housing Stability: Not on file       O:   Vitals:    01/05/23 1128   BP: 122/74     She appears well and in no distress  Abdomen is soft and nontender  External genitals are normal    A/P:  Postpartum visit    PP pre-eclampsia- taking procardia daily still  Normotensive today  Reviewed option of d/c procardia today vs waiting until 6 wks PP  Pt would prefer to continue procardia until six weeks PP  Returning to office for Mirena insertion in two weeks so will plan to check BP and d/c procardia at that time  Aware to call if symptoms of hypotension are present  Contraception - would like to move forward with scheduling Mirena IUD  Advised to remain abstinent until insertion  Counseled on insertion process   RTO in 2 wks for Mirena IUD  Pap up to date  Preexisting type 2 DM- has appt with her PCP who will resume management     She will return in 2 weeks for Mirena IUD insertion and BP check

## 2023-01-05 ENCOUNTER — POSTPARTUM VISIT (OUTPATIENT)
Dept: OBGYN CLINIC | Facility: MEDICAL CENTER | Age: 33
End: 2023-01-05

## 2023-01-05 VITALS
SYSTOLIC BLOOD PRESSURE: 122 MMHG | WEIGHT: 195 LBS | BODY MASS INDEX: 30.61 KG/M2 | DIASTOLIC BLOOD PRESSURE: 74 MMHG | HEIGHT: 67 IN

## 2023-01-06 NOTE — UTILIZATION REVIEW
Requesting authorization for OBSERVATION stay       Discharge Summary - OB/GYN   Zachariah Bey 29 y o  female MRN: [id number]  Unit/Bed#: -01 Encounter: [id number]        Admission Date: 12/15/2022      Discharge Date: 22     Admitting Diagnosis:   1  Pregnancy at 37w6d  2  Preeclampsia w SF  3  Type 2 DM     Discharge Diagnosis:   Same, delivered     Procedures: spontaneous vaginal delivery     Delivering Attending: Eber Carter MD  Discharge Attending: Dr Rebecca Puri Course:   Zachariah Bey is a 29 y o   8 days postpartum from uncomplicated  who presents after being sent from the office with severely elevated blood pressures   Patient has known gestational hypertension   This is the first instance of severely elevated blood pressures   On presentation to the ED she had persistent severely elevated blood pressures   She denies vision changes chest pain, shortness of breath, right upper quadrant/epigastric pain   She does report increased swelling in her legs  She reports minimal lochia   She denies fever, chills, abnormal foul-smelling discharge, dysuria      On day of discharge, she was ambulating and able to reasonably perform all ADLs  She was voiding and had appropriate bowel function  Pain was well controlled  She was discharged home on post-partum day #8 without complications   Patient was instructed to follow up with her OB as an outpatient and was given appropriate warnings to call provider if she develops signs of infection or uncontrolled pain      Complications: none apparent     Condition at discharge: good      Discharge instructions/Information to patient and family:   See after visit summary for information provided to patient and family        Provisions for Follow-Up Care:  See after visit summary for information related to follow-up care and any pertinent home health orders        Disposition: Home     Planned Readmission: No     Discharge Medications:   For a complete list of the patient's medications, please refer to her med rec                      Cosigned by: Massiel Hope MD at 12/19/2022 10:30 AM     Revision History

## 2023-01-09 RX ORDER — NIFEDIPINE 30 MG/1
TABLET, EXTENDED RELEASE ORAL
Qty: 90 TABLET | Refills: 2 | Status: SHIPPED | OUTPATIENT
Start: 2023-01-09

## 2023-01-19 ENCOUNTER — PROCEDURE VISIT (OUTPATIENT)
Dept: OBGYN CLINIC | Facility: MEDICAL CENTER | Age: 33
End: 2023-01-19

## 2023-01-19 VITALS
BODY MASS INDEX: 32.05 KG/M2 | WEIGHT: 204.2 LBS | HEIGHT: 67 IN | SYSTOLIC BLOOD PRESSURE: 120 MMHG | DIASTOLIC BLOOD PRESSURE: 78 MMHG

## 2023-01-19 DIAGNOSIS — Z32.02 NEGATIVE PREGNANCY TEST: ICD-10-CM

## 2023-01-19 DIAGNOSIS — Z30.430 ENCOUNTER FOR INSERTION OF MIRENA IUD: Primary | ICD-10-CM

## 2023-01-19 LAB — SL AMB POCT URINE HCG: NORMAL

## 2023-01-19 NOTE — PROGRESS NOTES
Iud insertions    Date/Time: 1/19/2023 1:59 PM  Performed by: Lawrence Ellison PA-C  Authorized by: Lawrence Ellison PA-C     Other Assisting Provider: Yes (comment) (Genie Currie present for assistance)    Risks and benefits: Risks, benefits and alternatives were discussed    Consent given by:  Patient  Patient states understanding of procedure being performed: Yes    Patient identity confirmed:  Verbally with patient  Procedure:     Pelvic exam performed: yes      Negative urine pregnancy test: yes (negative)      Cervix cleaned and prepped: yes      Speculum placed in vagina: yes      Tenaculum applied to cervix: yes      Uterus sounded: yes      Uterus sound depth (cm):  9    IUD type:  Mirena    Strings trimmed: yes    Post-procedure:     Patient tolerated procedure well: yes    Comments:      Cervix was cleansed  Uterus sounded to 9 cm  Utilizing sterile technique, the IUD applicator was advanced through the cervix to the uterine fundus  The IUD was deployed and applicator was removed  Strings visible and trimmed  Patient tolerated procedure well  Blood pressure normotensive after IUD insertion today  120/78  Reviewed she may d/c Procardia  Advised to monitor BP off medication over the next week   If BP is elevated she will need follow up with her PCP

## 2023-03-22 NOTE — PROGRESS NOTES
Assessment   29 y o  Yesica Nipper presenting for annual exam      Plan   Diagnoses and all orders for this visit:    Well woman exam with routine gynecological exam    Elevated blood pressure reading without diagnosis of hypertension        Pap up to date  Contraception- happy with Mirena IUD  Elevated BP reading in office without dx of HTN- hx of PEC in PP; recommended pt continue at home BP monitoring and follow up with her PCP     SBE encouraged, A yearly mammogram is recommended for breast cancer screening starting at age 36  ASCCP guidelines reviewed  Advised to call with any issues, all concerns & questions addressed  See provided information in your after visit summary     F/U Annually and PRN    Results will be released to [facility], if abnormal will call or message to review and discuss treatment plan      __________________________________________________________________    Ghada Menchaca is a 29 y o  Lylynn Nipper presenting for annual exam      She had hx of PP PEC  Has been watching BP at home  Reports at home readings are typically 130s/70s  Today BP slightly elevated 140/90  EPDS= 3    Mirena placed 23  Had some heavy bleeding after placement  Has been amenorrheic since placement  She is very happy with the Mirena IUD    SCREENING  Last Pap: 2022 neg/neg      GYN  Sexually active: Yes - single partner - male  Contraception: Mirena IUD    Hx Abnormal pap: denies  We reviewed ASCCP guidelines for Pap testing today  Denies vaginal discharge, itching, odor, dyspareunia, pelvic pain and vulvar/vaginal symptoms      OB         Complaints: denies   Denies urgency, frequency, hematuria, leakage / change in stream, difficulty urinating         BREAST  Complaints: denies   Denies: breast lump, breast tenderness, nipple discharge, skin color change, and skin lesion(s)      Pertinent Family Hx:   Family hx of breast cancer: no  Family hx of ovarian cancer: no  Family hx of colon cancer: no      GENERAL  PMH reviewed/updated and is as below  Patient does follow with a PCP  SOCIAL  Smoking: no  Alcohol:occasional  Drug: no  Occupation: [employer]      Past Medical History:   Diagnosis Date   • 16 weeks gestation of pregnancy 7/14/2022   • Annual physical exam 7/14/2022   • Asthma    • BMI 29 0-29 9,adult 5/18/2021   • BMI 30 0-30 9,adult 5/6/2021   • BMI 31 0-31 9,adult 8/24/2021   • BMI 33 0-33 9,adult 12/14/2021   • Constipation 12/14/2021   • COVID-19 5/6/2021   • Diabetes mellitus ([facility] [address] )    • DM2 (diabetes mellitus, type 2) ([facility] [address] )    • Elevated blood pressure reading in office without diagnosis of hypertension 3/31/2022   • Elevated cortisol level    • Hand pain, right    • High triglycerides    • Hypertriglyceridemia 5/2/2021   • Microalbuminuria 5/18/2021   • Mild intermittent asthma without complication 9/2/1186   • Obesity    • Rhinitis, allergic 5/6/2021   • Sinobronchitis    • Skin rash 7/14/2022   • Type 2 diabetes mellitus (HCC)        Past Surgical History:   Procedure Laterality Date   • CHOLECYSTECTOMY     • HAND SURGERY Right    • WISDOM TOOTH EXTRACTION           Current Outpatient Medications:   •  acetaminophen (TYLENOL) 325 mg tablet, Take 2 tablets (650 mg total) by mouth every 6 (six) hours (Patient not taking: Reported on 1/5/2023), Disp: 30 tablet, Rfl: 0  •  Blood Glucose Monitoring Suppl (OneTouch Verio Flex System) w/Device KIT, Dispense 1 kit per insurance formulary  , Disp: 1 kit, Rfl: 0  •  cholecalciferol (VITAMIN D3) 400 units tablet, Take 400 Units by mouth daily, Disp: , Rfl:   •  docusate sodium (COLACE) 100 mg capsule, Take 1 capsule (100 mg total) by mouth 2 (two) times a day as needed for constipation (Patient not taking: Reported on 1/19/2023), Disp: , Rfl: 0  •  ibuprofen (MOTRIN) 600 mg tablet, Take 1 tablet (600 mg total) by mouth every 6 (six) hours (Patient not taking: Reported on 1/5/2023), Disp: 30 tablet, Rfl: 0  •  Insulin Pen Needle (BD Pen Needle Ngoc U/F) 32G X 4 MM MISC, Use up to 5 a day as recommended  (Patient not taking: Reported on 1/19/2023), Disp: 150 each, Rfl: 0  •  loratadine (CLARITIN) 10 mg tablet, TAKE 1 TABLET BY MOUTH EVERY DAY, Disp: 90 tablet, Rfl: 2  •  metFORMIN (GLUCOPHAGE-XR) 500 mg 24 hr tablet, Take 4 tablets (2,000 mg total) by mouth daily with dinner, Disp: 360 tablet, Rfl: 1  •  NIFEdipine (PROCARDIA XL) 30 mg 24 hr tablet, TAKE 1 TABLET BY MOUTH EVERY DAY, Disp: 90 tablet, Rfl: 2  •  OneTouch Delica Lancets 03Q MISC, Use 6 a day or as directed  T2DM , Disp: 150 each, Rfl: 6    Allergies   Allergen Reactions   • Latex Rash       Social History     Socioeconomic History   • Marital status: /Civil Union     Spouse name: Not on file   • Number of children: Not on file   • Years of education: Not on file   • Highest education level: Not on file   Occupational History   • Not on file   Tobacco Use   • Smoking status: Never   • Smokeless tobacco: Never   Vaping Use   • Vaping Use: Never used   Substance and Sexual Activity   • Alcohol use: Yes     Comment: occasionally   • Drug use: No   • Sexual activity: Not Currently     Partners: Male     Birth control/protection: OCP   Other Topics Concern   • Not on file   Social History Narrative    Lives with     Annual eye exam: Advise to see ophthalmology    Pap smear by her gyn    - [facility]     Social Determinants of Health     Financial Resource Strain: Not on file   Food Insecurity: Not on file   Transportation Needs: Not on file   Physical Activity: Not on file   Stress: Not on file   Social Connections: Not on file   Intimate Partner Violence: Not on file   Housing Stability: Not on file       Review of Systems     ROS:  Constitutional: Negative for fatigue and unexpected weight change  Respiratory: Negative for cough and shortness of breath  Cardiovascular: Negative for chest pain and palpitations     Gastrointestinal: Negative for abdominal pain and change in bowel habits  Breasts:  Negative, other than as noted above  Genitourinary: Negative, other than as noted above  Psychiatric: Negative for mood difficulties  Objective         Vitals:    03/23/23 1305   BP: 140/90       Physical Examination:    Patient appears well and is not in distress  Neck is supple without masses, no cervical or supraclavicular lymphadenopathy  Cardiovascular: regular rate and rhythm; no murmurs  Lungs: clear to auscultation bilaterally; no wheezes  Breasts are symmetrical without mass, tenderness, nipple discharge, skin changes or adenopathy  Abdomen is soft and nontender without masses  External genitals are normal without lesions or rashes  Urethral meatus and urethra are normal  Bladder is normal to palpation  Vagina is normal without discharge or bleeding  Cervix is normal without discharge or lesion  + IUD strings  Uterus is normal, mobile, nontender without palpable mass  Adnexa are normal, nontender, without palpable mass

## 2023-03-23 ENCOUNTER — ANNUAL EXAM (OUTPATIENT)
Dept: OBGYN CLINIC | Facility: MEDICAL CENTER | Age: 33
End: 2023-03-23

## 2023-03-23 VITALS
WEIGHT: 220 LBS | SYSTOLIC BLOOD PRESSURE: 140 MMHG | BODY MASS INDEX: 34.53 KG/M2 | DIASTOLIC BLOOD PRESSURE: 90 MMHG | HEIGHT: 67 IN

## 2023-03-23 DIAGNOSIS — R03.0 ELEVATED BLOOD PRESSURE READING WITHOUT DIAGNOSIS OF HYPERTENSION: ICD-10-CM

## 2023-03-23 DIAGNOSIS — Z01.419 WELL WOMAN EXAM WITH ROUTINE GYNECOLOGICAL EXAM: Primary | ICD-10-CM

## 2023-03-23 PROBLEM — O13.9 GESTATIONAL HYPERTENSION: Status: RESOLVED | Noted: 2022-03-31 | Resolved: 2023-03-23

## 2023-03-23 PROBLEM — Z3A.37 37 WEEKS GESTATION OF PREGNANCY: Status: RESOLVED | Noted: 2022-06-16 | Resolved: 2023-03-23

## 2023-03-23 PROBLEM — E11.65 PRE-EXISTING TYPE 2 DIABETES MELLITUS WITH HYPERGLYCEMIA DURING PREGNANCY IN THIRD TRIMESTER (HCC): Status: RESOLVED | Noted: 2022-05-16 | Resolved: 2023-03-23

## 2023-03-23 PROBLEM — O14.10 PREECLAMPSIA, SEVERE: Status: RESOLVED | Noted: 2022-12-15 | Resolved: 2023-03-23

## 2023-03-23 PROBLEM — O99.213 OBESITY AFFECTING PREGNANCY IN THIRD TRIMESTER: Status: RESOLVED | Noted: 2022-06-16 | Resolved: 2023-03-23

## 2023-03-23 PROBLEM — O24.113 PRE-EXISTING TYPE 2 DIABETES MELLITUS WITH HYPERGLYCEMIA DURING PREGNANCY IN THIRD TRIMESTER (HCC): Status: RESOLVED | Noted: 2022-05-16 | Resolved: 2023-03-23

## 2023-03-23 NOTE — PROGRESS NOTES
LMP:  Mirena IUD   PMB  SA:  YES   HPV:  YES all 3 doses   Birth control:  Mirena   Last pap: 2022 Negative   Last mammo: Not on file:  Last colonoscopy: Not on file  Last Dexa: Not on file  Family History: No GYN cancer in the family history   Type 2 diabetic

## 2023-09-26 NOTE — TELEPHONE ENCOUNTER
----- Message from Noemy Murray PA-C sent at 11/3/2022  2:18 PM EDT -----  Regarding: IOL - medically necessary  Procedure to be scheduled (IOL or CS): IOL  YOVANY: Estimated Date of Delivery: 12/24/22  Indication for delivery: pre gestational diabetes   Requested date (s) of delivery: 39 weeks -- would prefer to delivery earlier in the week     If requested date is unavailable, is there a date by which the pt must be delivered?  12/23/2022 (39 6/7)  Physician preference: none     If IOL, anticipated method: with ripening Island Pedicle Flap Text: The defect edges were debeveled with a #15 scalpel blade.  Given the location of the defect, shape of the defect and the proximity to free margins an island pedicle advancement flap was deemed most appropriate.  Using a sterile surgical marker, an appropriate advancement flap was drawn incorporating the defect, outlining the appropriate donor tissue and placing the expected incisions within the relaxed skin tension lines where possible.    The area thus outlined was incised deep to adipose tissue with a #15 scalpel blade.  The skin margins were undermined to an appropriate distance in all directions around the primary defect and laterally outward around the island pedicle utilizing iris scissors.  There was minimal undermining beneath the pedicle flap.

## 2024-06-10 ENCOUNTER — TELEPHONE (OUTPATIENT)
Age: 34
End: 2024-06-10

## 2024-06-10 NOTE — TELEPHONE ENCOUNTER
The patient called she had to cancel her appointment for tomorrow she has to work she would like to reschedule her day off is normally a Tuesday  she needs an ovl appointment   please call to reschedule thank you

## 2024-06-11 NOTE — TELEPHONE ENCOUNTER
Patient not scheduled as OVL- She was scheduled as PE. Spoke with patient and rescheduled to PE and DM foot exam on 7/9/24.

## 2024-07-09 ENCOUNTER — OFFICE VISIT (OUTPATIENT)
Dept: INTERNAL MEDICINE CLINIC | Facility: CLINIC | Age: 34
End: 2024-07-09
Payer: COMMERCIAL

## 2024-07-09 VITALS
HEIGHT: 67 IN | OXYGEN SATURATION: 97 % | RESPIRATION RATE: 18 BRPM | HEART RATE: 96 BPM | SYSTOLIC BLOOD PRESSURE: 138 MMHG | WEIGHT: 207 LBS | DIASTOLIC BLOOD PRESSURE: 92 MMHG | BODY MASS INDEX: 32.49 KG/M2 | TEMPERATURE: 98.1 F

## 2024-07-09 DIAGNOSIS — E11.9 TYPE 2 DIABETES MELLITUS WITHOUT COMPLICATION, WITHOUT LONG-TERM CURRENT USE OF INSULIN (HCC): Primary | ICD-10-CM

## 2024-07-09 DIAGNOSIS — E78.1 HYPERTRIGLYCERIDEMIA: ICD-10-CM

## 2024-07-09 DIAGNOSIS — R80.9 MICROALBUMINURIA: ICD-10-CM

## 2024-07-09 DIAGNOSIS — E55.9 VITAMIN D DEFICIENCY: ICD-10-CM

## 2024-07-09 PROCEDURE — 99214 OFFICE O/P EST MOD 30 MIN: CPT | Performed by: INTERNAL MEDICINE

## 2024-07-09 RX ORDER — CIDER VINEGAR 300 MG
TABLET ORAL
COMMUNITY

## 2024-07-09 RX ORDER — FAMOTIDINE 20 MG
1 TABLET ORAL DAILY
COMMUNITY

## 2024-07-09 NOTE — PATIENT INSTRUCTIONS
Patient was advised to continue present medications. discussed with the patient medications and laboratory data in detail.  Follow-up with me in 4 weeks or as advised.  If any blood test was ordered please do 1 week prior to next appointment unless advise to get earlier.  If you have any questions please call the office [phone]  Patient Education     Type 2 diabetes   The Basics   Written by the doctors and editors at [facility]   What is type 2 diabetes? -- This is a disorder that disrupts the way the body uses sugar. It is sometimes called type 2 diabetes mellitus.  All of the cells in the body need sugar to work normally. Sugar gets into the cells with the help of a hormone called insulin. Insulin is made by the pancreas, an organ in the belly. If there is not enough insulin, or if cells in the body don't respond normally to insulin, sugar builds up in the blood. That is what happens to people with diabetes.  There are 2 different types of diabetes:   In type 1 diabetes, the pancreas makes little or no insulin.   In type 2 diabetes, the pancreas still makes some insulin, but the cells in the body stop responding normally. Eventually, the pancreas cannot make enough insulin to keep up.  Having excess body weight or obesity increases a person's risk of developing type 2 diabetes. But people without excess body weight can get diabetes, too.  What are the symptoms of type 2 diabetes? -- Type 2 diabetes usually causes no symptoms. When symptoms do happen, they include:   Needing to urinate often   Intense thirst   Blurry vision  Can diabetes lead to other health problems? -- Yes. Type 2 diabetes might not make you feel sick. But if it is not managed, it can lead to serious problems over time, such as:   Heart attacks   Strokes   Kidney disease   Vision problems (or even blindness)   Pain or loss of feeling in the hands and feet   Needing to have fingers, toes, or other body parts removed (amputated)  How do I know  "if I have type 2 diabetes? -- Your doctor or nurse can do a blood test. There are 2 tests that can be used for this. Both involve measuring the amount of sugar in your blood, called your \"blood sugar\" or \"blood glucose\":   One of the tests measures your blood sugar at the time the blood sample is taken. This test is done in the morning. You can't eat or drink anything except water for at least 8 hours before the test.   The other test shows what your average blood sugar has been for the past 2 to 3 months. This blood test is called \"hemoglobin A1C\" or just \"A1C.\" It can be checked at any time of the day, even if you have recently eaten.  How is type 2 diabetes treated? -- The goals of treatment are to manage your blood sugar and lower the risk of future problems that can happen in people with diabetes.  Treatment might include:   Lifestyle changes - This is an important part of managing diabetes. It includes eating healthy foods and getting plenty of physical activity.   Medicines - There are a few medicines that help lower blood sugar. Some people need to take pills that help the body make more insulin or that help insulin do its job. Others need insulin shots.  Depending on what medicines you take, you might need to check your blood sugar regularly at home. But not everyone with type 2 diabetes needs to do this. Your doctor or nurse will tell you if you should be checking your blood sugar, and when and how to do this.  Sometimes, people with type 2 diabetes also need medicines to help prevent problems caused by the disease. For instance, medicines used to lower blood pressure can reduce the chances of a heart attack or stroke.   General medical care - It's also important to take care of other areas of your health. This includes watching your blood pressure and cholesterol levels. You should also get certain vaccines, such as vaccines to protect against the flu and coronavirus disease 2019 (\"COVID-19\"). Some people " also need a vaccine to prevent pneumonia.  Can type 2 diabetes be prevented? -- Yes. To lower your chances of getting type 2 diabetes, the most important thing you can do is eat a healthy diet and get plenty of physical activity. This can help you lose weight if you are overweight. But eating well and being active are also good for your overall health. Even gentle activity, like walking, has benefits.  If you smoke, quitting can also lower your risk of type 2 diabetes. Quitting smoking can be difficult, but your doctor or nurse can help.  All topics are updated as new evidence becomes available and our peer review process is complete.  This topic retrieved from [employer] on: Apr 24, 2024.  Topic 57015 Version 23.0  Release: 32.3.2 - C32.113  © 2024 UpToDate, Inc. and/or its affiliates. All rights reserved.  Consumer Information Use and Disclaimer   Disclaimer: This generalized information is a limited summary of diagnosis, treatment, and/or medication information. It is not meant to be comprehensive and should be used as a tool to help the user understand and/or assess potential diagnostic and treatment options. It does NOT include all information about conditions, treatments, medications, side effects, or risks that may apply to a specific patient. It is not intended to be medical advice or a substitute for the medical advice, diagnosis, or treatment of a health care provider based on the health care provider's examination and assessment of a patient's specific and unique circumstances. Patients must speak with a health care provider for complete information about their health, medical questions, and treatment options, including any risks or benefits regarding use of medications. This information does not endorse any treatments or medications as safe, effective, or approved for treating a specific patient. UpToDate, Inc. and its affiliates disclaim any warranty or liability relating to this information or the use  thereof.The use of this information is governed by the Terms of Use, available at https://www.wolterskluwer.com/en/know/clinical-effectiveness-terms. 2024© UpToDate, Inc. and its affiliates and/or licensors. All rights reserved.  Copyright   © 2024 [employer] and/or its affiliates. All rights reserved.

## 2024-07-09 NOTE — PROGRESS NOTES
Assessment/Plan:    1. Type 2 diabetes mellitus without complication, without long-term current use of insulin (Formerly [facility])  -     Ambulatory Referral to Ophthalmology; Future  -     CBC and differential; Future  -     Albumin / creatinine urine ratio; Future  -     Lipid panel; Future  -     Hemoglobin A1C; Future  -     Comprehensive metabolic panel; Future  -     UA (URINE) with reflex to Scope; Future  -     CBC and differential  -     Albumin / creatinine urine ratio  -     Lipid panel  -     Hemoglobin A1C  -     Comprehensive metabolic panel  -     UA (URINE) with reflex to Scope  -     Hemoglobin A1C; Future; Expected date: 10/09/2024  -     Hemoglobin A1C  2. Hypertriglyceridemia  -     Lipid panel; Future  -     Lipid panel  3. Microalbuminuria  -     CBC and differential; Future  -     Comprehensive metabolic panel; Future  -     CBC and differential  -     Comprehensive metabolic panel  4. BMI 33.0-33.9,adult  Assessment & Plan:  Patient  was advised to decrease portion size.  Advised to decrease carb, sugar, cholesterol intake.  Advised to exercise 3-5 times per week.  Advised to lose weight.  5. Vitamin D deficiency  -     Comprehensive metabolic panel; Future  -     Vitamin D 25 hydroxy; Future  -     Comprehensive metabolic panel  -     Vitamin D 25 hydroxy     Discussion/summary/plan:    Patient states she stopped taking her medication for diabetes since after pregnancy.  She has been not checking her blood sugar at home.  Last hemoglobin A1c was done November 2022 was 5.5.  Will check fasting blood sugar hemoglobin A1c and advise accordingly.  Advised to continue 1800-calorie ADA diet.  Last cholesterol 150, triglyceride 94, HDL 57, LDL 74 advised to continue low-cholesterol low-carb diet we will follow lipid panel.  History of microalbuminuria will follow urinalysis and microalbumin.  History of vitamin D deficiency on vitamin D supplement 1000 IU daily will follow vitamin D level.  Advised to see eye an   doctor.  Patient states last time she was seen by eye  Was about 3 years ago.    Patient states when she goes to see  Her blood pressure and heart rate goes up.  At home her blood pressure and heart rate is okay.  Heart rate in 70s at home.  Advised to keep the log of the blood pressure and heart rate and bring it back to office next visit.    Patient states she has been seen and followed by GYN has IUD.    Subjective: Patient presents for follow-up.      Patient ID: Suzie Bey is a 33 y.o. female.    HPI  33-year-old white female patient presents for follow-up of her medical problems.  She denies any chest pain, shortness of breath, pain in the abdomen.  Denies any cough, fever, chills.  Denies any nausea vomiting diarrhea.    The following portions of the patient's history were reviewed and updated as appropriate:     Past Medical History:  She has a past medical history of 16 weeks gestation of pregnancy (07/14/2022), Annual physical exam (07/14/2022), Asthma, BMI 29.0-29.9,adult (05/18/2021), BMI 30.0-30.9,adult (05/06/2021), BMI 31.0-31.9,adult (08/24/2021), BMI 33.0-33.9,adult (12/14/2021), Constipation (12/14/2021), COVID-19 (05/06/2021), Diabetes mellitus (HCC), DM2 (diabetes mellitus, type 2) ([facility]), Elevated blood pressure reading in office without diagnosis of hypertension (03/31/2022), Elevated cortisol level, Hand pain, right, High triglycerides, Hypertriglyceridemia (05/02/2021), Microalbuminuria (05/18/2021), Mild intermittent asthma without complication (05/02/2021), Obesity, Rhinitis, allergic (05/06/2021), Sinobronchitis, Skin rash (07/14/2022), Type 2 diabetes mellitus (HCC), and Vitamin D deficiency (07/09/2024).,  _______________________________________________________________________  Past Surgical History:   has a past surgical history that includes Cholecystectomy; [place] tooth extraction; and Hand surgery  (Right).,  _______________________________________________________________________  Family History:  family history includes COPD in her father; Diabetes in her mother; Fibromyalgia in her father; Heart attack in her maternal grandfather and maternal grandmother; Heart disease in her maternal grandfather and maternal grandmother; Hypertension in her mother; No Known Problems in her brother, daughter, and paternal grandmother; Prostate cancer in her paternal grandfather; Stroke in her maternal grandmother.,  _______________________________________________________________________  Social History:   reports that she has never smoked. She has never used smokeless tobacco. She reports current alcohol use. She reports that she does not use drugs.,  _______________________________________________________________________  Allergies:  is allergic to latex..  _______________________________________________________________________  Current Outpatient Medications   Medication Sig Dispense Refill   • Multiple Vitamin (MULTIVITAMIN ADULT PO) Take by mouth     • Omega-3 Fatty Acids (Fish Oil) 1000 MG CPDR Take by mouth     • Vitamin D, Cholecalciferol, 25 MCG (1000 UT) CAPS Take 1 tablet by mouth in the morning     • Blood Glucose Monitoring Suppl (OneTouch Verio Flex System) w/Device KIT Dispense 1 kit per insurance formulary. (Patient not taking: Reported on 7/9/2024) 1 kit 0   • OneTouch Delica Lancets 33G MISC Use 6 a day or as directed. T2DM. (Patient not taking: Reported on 7/9/2024) 150 each 6     No current facility-administered medications for this visit.     _______________________________________________________________________  Review of Systems   Constitutional:  Negative for chills, fatigue and fever.   HENT:  Negative for congestion, ear pain, hearing loss, nosebleeds, sinus pain, sore throat and trouble swallowing.    Eyes:  Negative for discharge, redness and visual disturbance.   Respiratory:  Negative for cough,  "chest tightness and shortness of breath.    Cardiovascular:  Negative for chest pain and palpitations.   Gastrointestinal:  Negative for abdominal pain, blood in stool, constipation, diarrhea, nausea and vomiting.   Genitourinary:  Negative for dysuria, flank pain, frequency and hematuria.   Musculoskeletal:  Negative for arthralgias, myalgias and neck pain.   Skin:  Negative for color change and rash.   Neurological:  Negative for dizziness, speech difficulty, weakness and headaches.   Hematological:  Does not bruise/bleed easily.   Psychiatric/Behavioral:  Negative for agitation and behavioral problems.          Objective:  Vitals:    07/09/24 0904   BP: 138/92   BP Location: Left arm   Patient Position: Sitting   Cuff Size: Large   Pulse: 96   Resp: 18   Temp: 98.1 °F (36.7 °C)   TempSrc: Temporal   SpO2: 97%   Weight: 93.9 kg (207 lb)   Height: 5' 7\" (1.702 m)     Body mass index is 32.42 kg/m².     Physical Exam  Vitals and nursing note reviewed.   Constitutional:       General: She is not in acute distress.     Appearance: She is obese.   HENT:      Head: Normocephalic and atraumatic.      Right Ear: Ear canal and external ear normal.      Left Ear: Ear canal and external ear normal.      Nose: Nose normal.      Mouth/Throat:      Mouth: Mucous membranes are moist.   Eyes:      General: No scleral icterus.        Right eye: No discharge.         Left eye: No discharge.      Extraocular Movements: Extraocular movements intact.      Conjunctiva/sclera: Conjunctivae normal.   Cardiovascular:      Rate and Rhythm: Normal rate and regular rhythm.      Pulses: Normal pulses.      Heart sounds: Normal heart sounds. No murmur heard.  Pulmonary:      Effort: Pulmonary effort is normal. No respiratory distress.      Breath sounds: Normal breath sounds. No wheezing, rhonchi or rales.   Abdominal:      General: Bowel sounds are normal. There is no distension.      Palpations: Abdomen is soft.      Tenderness: There is no " abdominal tenderness.   Musculoskeletal:         General: Normal range of motion.      Cervical back: Normal range of motion and neck supple. No muscular tenderness.      Right lower leg: No edema.      Left lower leg: No edema.   Skin:     General: Skin is warm.      Findings: No rash.   Neurological:      General: No focal deficit present.      Mental Status: She is alert and oriented to person, place, and time.      Motor: No weakness.      Coordination: Coordination normal.   Psychiatric:         Mood and Affect: Mood normal.         Behavior: Behavior normal.

## 2024-08-06 ENCOUNTER — TELEPHONE (OUTPATIENT)
Dept: INTERNAL MEDICINE CLINIC | Facility: CLINIC | Age: 34
End: 2024-08-06

## 2024-09-27 ENCOUNTER — APPOINTMENT (OUTPATIENT)
Dept: LAB | Facility: MEDICAL CENTER | Age: 34
End: 2024-09-27
Payer: COMMERCIAL

## 2024-09-27 LAB
25(OH)D3 SERPL-MCNC: 48.4 NG/ML (ref 30–100)
ALBUMIN SERPL BCG-MCNC: 4.4 G/DL (ref 3.5–5)
ALP SERPL-CCNC: 66 U/L (ref 34–104)
ALT SERPL W P-5'-P-CCNC: 25 U/L (ref 7–52)
ANION GAP SERPL CALCULATED.3IONS-SCNC: 12 MMOL/L (ref 4–13)
AST SERPL W P-5'-P-CCNC: 23 U/L (ref 13–39)
BACTERIA UR QL AUTO: ABNORMAL /HPF
BASOPHILS # BLD AUTO: 0.03 THOUSANDS/ΜL (ref 0–0.1)
BASOPHILS NFR BLD AUTO: 0 % (ref 0–1)
BILIRUB SERPL-MCNC: 0.83 MG/DL (ref 0.2–1)
BILIRUB UR QL STRIP: NEGATIVE
BUN SERPL-MCNC: 9 MG/DL (ref 5–25)
CALCIUM SERPL-MCNC: 9.3 MG/DL (ref 8.4–10.2)
CHLORIDE SERPL-SCNC: 99 MMOL/L (ref 96–108)
CHOLEST SERPL-MCNC: 201 MG/DL
CLARITY UR: CLEAR
CO2 SERPL-SCNC: 27 MMOL/L (ref 21–32)
COLOR UR: YELLOW
CREAT SERPL-MCNC: 0.55 MG/DL (ref 0.6–1.3)
CREAT UR-MCNC: 80.7 MG/DL
EOSINOPHIL # BLD AUTO: 0.17 THOUSAND/ΜL (ref 0–0.61)
EOSINOPHIL NFR BLD AUTO: 2 % (ref 0–6)
ERYTHROCYTE [DISTWIDTH] IN BLOOD BY AUTOMATED COUNT: 11.9 % (ref 11.6–15.1)
EST. AVERAGE GLUCOSE BLD GHB EST-MCNC: 283 MG/DL
GFR SERPL CREATININE-BSD FRML MDRD: 125 ML/MIN/1.73SQ M
GLUCOSE P FAST SERPL-MCNC: 285 MG/DL (ref 65–99)
GLUCOSE UR STRIP-MCNC: ABNORMAL MG/DL
HBA1C MFR BLD: 11.5 %
HCT VFR BLD AUTO: 45.7 % (ref 34.8–46.1)
HDLC SERPL-MCNC: 43 MG/DL
HGB BLD-MCNC: 16.1 G/DL (ref 11.5–15.4)
HGB UR QL STRIP.AUTO: NEGATIVE
IMM GRANULOCYTES # BLD AUTO: 0.03 THOUSAND/UL (ref 0–0.2)
IMM GRANULOCYTES NFR BLD AUTO: 0 % (ref 0–2)
KETONES UR STRIP-MCNC: ABNORMAL MG/DL
LDLC SERPL CALC-MCNC: 129 MG/DL (ref 0–100)
LEUKOCYTE ESTERASE UR QL STRIP: ABNORMAL
LYMPHOCYTES # BLD AUTO: 2.92 THOUSANDS/ΜL (ref 0.6–4.47)
LYMPHOCYTES NFR BLD AUTO: 34 % (ref 14–44)
MCH RBC QN AUTO: 31.3 PG (ref 26.8–34.3)
MCHC RBC AUTO-ENTMCNC: 35.2 G/DL (ref 31.4–37.4)
MCV RBC AUTO: 89 FL (ref 82–98)
MICROALBUMIN UR-MCNC: 202.3 MG/L
MICROALBUMIN/CREAT 24H UR: 251 MG/G CREATININE (ref 0–30)
MONOCYTES # BLD AUTO: 0.52 THOUSAND/ΜL (ref 0.17–1.22)
MONOCYTES NFR BLD AUTO: 6 % (ref 4–12)
NEUTROPHILS # BLD AUTO: 5.03 THOUSANDS/ΜL (ref 1.85–7.62)
NEUTS SEG NFR BLD AUTO: 58 % (ref 43–75)
NITRITE UR QL STRIP: NEGATIVE
NON-SQ EPI CELLS URNS QL MICRO: ABNORMAL /HPF
NONHDLC SERPL-MCNC: 158 MG/DL
NRBC BLD AUTO-RTO: 0 /100 WBCS
PH UR STRIP.AUTO: 7 [PH]
PLATELET # BLD AUTO: 419 THOUSANDS/UL (ref 149–390)
PMV BLD AUTO: 10.1 FL (ref 8.9–12.7)
POTASSIUM SERPL-SCNC: 4.1 MMOL/L (ref 3.5–5.3)
PROT SERPL-MCNC: 7.5 G/DL (ref 6.4–8.4)
PROT UR STRIP-MCNC: ABNORMAL MG/DL
RBC # BLD AUTO: 5.15 MILLION/UL (ref 3.81–5.12)
RBC #/AREA URNS AUTO: ABNORMAL /HPF
SODIUM SERPL-SCNC: 138 MMOL/L (ref 135–147)
SP GR UR STRIP.AUTO: 1.04 (ref 1–1.03)
TRIGL SERPL-MCNC: 145 MG/DL
UROBILINOGEN UR STRIP-ACNC: <2 MG/DL
WBC # BLD AUTO: 8.7 THOUSAND/UL (ref 4.31–10.16)
WBC #/AREA URNS AUTO: ABNORMAL /HPF

## 2024-10-01 ENCOUNTER — TELEPHONE (OUTPATIENT)
Dept: INTERNAL MEDICINE CLINIC | Facility: CLINIC | Age: 34
End: 2024-10-01

## 2024-10-01 ENCOUNTER — TELEPHONE (OUTPATIENT)
Age: 34
End: 2024-10-01

## 2024-10-01 ENCOUNTER — OFFICE VISIT (OUTPATIENT)
Dept: INTERNAL MEDICINE CLINIC | Facility: CLINIC | Age: 34
End: 2024-10-01
Payer: COMMERCIAL

## 2024-10-01 ENCOUNTER — PATIENT MESSAGE (OUTPATIENT)
Dept: INTERNAL MEDICINE CLINIC | Facility: CLINIC | Age: 34
End: 2024-10-01

## 2024-10-01 VITALS
SYSTOLIC BLOOD PRESSURE: 140 MMHG | BODY MASS INDEX: 32.65 KG/M2 | DIASTOLIC BLOOD PRESSURE: 90 MMHG | HEART RATE: 92 BPM | RESPIRATION RATE: 18 BRPM | WEIGHT: 208 LBS | OXYGEN SATURATION: 98 % | TEMPERATURE: 98.7 F | HEIGHT: 67 IN

## 2024-10-01 DIAGNOSIS — E55.9 VITAMIN D DEFICIENCY: ICD-10-CM

## 2024-10-01 DIAGNOSIS — E78.2 MIXED HYPERLIPIDEMIA: ICD-10-CM

## 2024-10-01 DIAGNOSIS — D58.2 ELEVATED HEMOGLOBIN (HCC): ICD-10-CM

## 2024-10-01 DIAGNOSIS — R80.9 MICROALBUMINURIA: ICD-10-CM

## 2024-10-01 DIAGNOSIS — R03.0 ELEVATED BLOOD PRESSURE READING: ICD-10-CM

## 2024-10-01 DIAGNOSIS — D75.839 THROMBOCYTOSIS: ICD-10-CM

## 2024-10-01 DIAGNOSIS — E11.9 TYPE 2 DIABETES MELLITUS WITHOUT COMPLICATION, WITHOUT LONG-TERM CURRENT USE OF INSULIN (HCC): Primary | ICD-10-CM

## 2024-10-01 PROCEDURE — 99214 OFFICE O/P EST MOD 30 MIN: CPT | Performed by: INTERNAL MEDICINE

## 2024-10-01 RX ORDER — LOSARTAN POTASSIUM 25 MG/1
25 TABLET ORAL DAILY
Qty: 30 TABLET | Refills: 2 | Status: SHIPPED | OUTPATIENT
Start: 2024-10-01

## 2024-10-01 NOTE — PATIENT INSTRUCTIONS
Patient was advised to continue present medications. discussed with the patient medications and laboratory data in detail.  Follow-up with me in 3 months or as advised.  If any blood test was ordered please do 1 week prior to next appointment unless advise to get earlier.  If you have any questions please call the office [phone]  Patient Education     Type 2 diabetes   The Basics   Written by the doctors and editors at [facility]   What is type 2 diabetes? -- This is a disorder that disrupts the way the body uses sugar. It is sometimes called type 2 diabetes mellitus.  All of the cells in the body need sugar to work normally. Sugar gets into the cells with the help of a hormone called insulin. Insulin is made by the pancreas, an organ in the belly. If there is not enough insulin, or if cells in the body don't respond normally to insulin, sugar builds up in the blood. That is what happens to people with diabetes.  There are 2 different types of diabetes:   In type 1 diabetes, the pancreas makes little or no insulin.   In type 2 diabetes, the pancreas still makes some insulin, but the cells in the body stop responding normally. Eventually, the pancreas cannot make enough insulin to keep up.  Having excess body weight or obesity increases a person's risk of developing type 2 diabetes. But people without excess body weight can get diabetes, too.  What are the symptoms of type 2 diabetes? -- Type 2 diabetes usually causes no symptoms. When symptoms do happen, they include:   Needing to urinate often   Intense thirst   Blurry vision  Can diabetes lead to other health problems? -- Yes. Type 2 diabetes might not make you feel sick. But if it is not managed, it can lead to serious problems over time, such as:   Heart attacks   Strokes   Kidney disease   Vision problems (or even blindness)   Pain or loss of feeling in the hands and feet   Needing to have fingers, toes, or other body parts removed (amputated)  How do I know  "if I have type 2 diabetes? -- Your doctor or nurse can do a blood test. There are 2 tests that can be used for this. Both involve measuring the amount of sugar in your blood, called your \"blood sugar\" or \"blood glucose\":   One of the tests measures your blood sugar at the time the blood sample is taken. This test is done in the morning. You can't eat or drink anything except water for at least 8 hours before the test.   The other test shows what your average blood sugar has been for the past 2 to 3 months. This blood test is called \"hemoglobin A1C\" or just \"A1C.\" It can be checked at any time of the day, even if you have recently eaten.  How is type 2 diabetes treated? -- The goals of treatment are to manage your blood sugar and lower the risk of future problems that can happen in people with diabetes.  Treatment might include:   Lifestyle changes - This is an important part of managing diabetes. It includes eating healthy foods and getting plenty of physical activity.   Medicines - There are a few medicines that help lower blood sugar. Some people need to take pills that help the body make more insulin or that help insulin do its job. Others need insulin shots.  Depending on what medicines you take, you might need to check your blood sugar regularly at home. But not everyone with type 2 diabetes needs to do this. Your doctor or nurse will tell you if you should be checking your blood sugar, and when and how to do this.  Sometimes, people with type 2 diabetes also need medicines to help prevent problems caused by the disease. For instance, medicines used to lower blood pressure can reduce the chances of a heart attack or stroke.   General medical care - It's also important to take care of other areas of your health. This includes watching your blood pressure and cholesterol levels. You should also get certain vaccines, such as vaccines to protect against the flu and coronavirus disease 2019 (\"COVID-19\"). Some people " also need a vaccine to prevent pneumonia.  Can type 2 diabetes be prevented? -- Yes. To lower your chances of getting type 2 diabetes, the most important thing you can do is eat a healthy diet and get plenty of physical activity. This can help you lose weight if you are overweight. But eating well and being active are also good for your overall health. Even gentle activity, like walking, has benefits.  If you smoke, quitting can also lower your risk of type 2 diabetes. Quitting smoking can be difficult, but your doctor or nurse can help.  All topics are updated as new evidence becomes available and our peer review process is complete.  This topic retrieved from [employer] on: Apr 24, 2024.  Topic 02255 Version 23.0  Release: 32.3.2 - C32.113  © 2024 UpToDate, Inc. and/or its affiliates. All rights reserved.  Consumer Information Use and Disclaimer   Disclaimer: This generalized information is a limited summary of diagnosis, treatment, and/or medication information. It is not meant to be comprehensive and should be used as a tool to help the user understand and/or assess potential diagnostic and treatment options. It does NOT include all information about conditions, treatments, medications, side effects, or risks that may apply to a specific patient. It is not intended to be medical advice or a substitute for the medical advice, diagnosis, or treatment of a health care provider based on the health care provider's examination and assessment of a patient's specific and unique circumstances. Patients must speak with a health care provider for complete information about their health, medical questions, and treatment options, including any risks or benefits regarding use of medications. This information does not endorse any treatments or medications as safe, effective, or approved for treating a specific patient. Up[employer]Date, Inc[employer] and its affiliates disclaim any warranty or liability relating to this information or the use  thereof.The use of this information is governed by the Terms of Use, available at https://www.wolterskluwer.com/en/know/clinical-effectiveness-terms. 2024© Up[employer]Date, Inc. and its affiliates and/or licensors. All rights reserved.  Copyright   © 2024 [employer] and/or its affiliates. All rights reserved.

## 2024-10-01 NOTE — PROGRESS NOTES
Assessment/Plan:    1. Type 2 diabetes mellitus without complication, without long-term current use of insulin (HCC)  -     semaglutide, 0.25 or 0.5 mg/dose, (Ozempic, 0.25 or 0.5 MG/DOSE,) 2 mg/3 mL injection pen; 0.25 mg under the skin every 7 days for 4 doses (28 days), THEN 0.5 mg under the skin every 7 days  -     Ambulatory Referral to Endocrinology; Future  -     Basic metabolic panel; Future  -     Hemoglobin A1C; Future  -     Basic metabolic panel  -     Hemoglobin A1C  -     Hemoglobin A1C; Future; Expected date: 01/01/2025  -     Hemoglobin A1C  2. Mixed hyperlipidemia  -     Lipid panel; Future  -     Lipid panel  3. Vitamin D deficiency  4. Microalbuminuria  -     losartan (COZAAR) 25 mg tablet; Take 1 tablet (25 mg total) by mouth daily  -     Ambulatory Referral to Endocrinology; Future  -     Basic metabolic panel; Future  -     Basic metabolic panel  5. BMI 32.0-32.9,adult  Assessment & Plan:  Patient  was advised to decrease portion size.  Advised to decrease carb, sugar, cholesterol intake.  Advised to exercise 3-5 times per week.  Advised to lose weight.  6. Elevated blood pressure reading  -     losartan (COZAAR) 25 mg tablet; Take 1 tablet (25 mg total) by mouth daily  -     Basic metabolic panel; Future  -     Basic metabolic panel  7. Thrombocytosis  -     CBC and differential; Future  -     CBC and differential  8. Elevated hemoglobin (HCC)  -     CBC and differential; Future  -     CBC and differential     Discussion/summary/plan:    Diabetes mellitus uncontrolled.  Hemoglobin A1c increased from 5.5-11.5.  Patient stopped taking all of her diabetes medication after she has a baby born about 2 years ago.  During pregnancy she was on insulin therapy.  Discussed with the patient to start Ozempic to help lose weight as well as for diabetes mellitus patient agrees to start on Ozempic.  Patient had already had seen a dietitian for diabetes mellitus.  Strongly advised to follow diet closely  1800-calorie ADA diet.  Will refer to endocrinologist for further evaluation recommendation.  Blood pressure elevated in office.  Patient states when she goes to see  Her blood pressure goes high.  At home her blood pressure 120/75-80 and heart rate in 70s.  Due to microalbuminemia discussed with the patient to start small dose of medication which should help for the blood pressure as well.  She agrees to start on losartan 25 mg daily.  Patient is not planning to be pregnant at least 1 or 2 more years.  Her hemoglobin and platelet count is elevated.  Patient is a non-smoker.  Questionable hemoconcentration versus other etiology.  Will follow CBC.  Cholesterol increased from 1 50-2 01 LDL increased from 74-1 29 patient states she is not watching her diet.  Advised for low-cholesterol low-carb diet.  Will follow lipid panel.  Patient advised to get influenza vaccination patient states she will get from her employer.    Subjective: Patient presents for follow-up.      Patient ID: Suzie Bey is a 33 y.o. female.    HPI      The following portions of the patient's history were reviewed and updated as appropriate:     Past Medical History:  She has a past medical history of 16 weeks gestation of pregnancy (07/14/2022), Annual physical exam (07/14/2022), Asthma, BMI 29.0-29.9,adult (05/18/2021), BMI 30.0-30.9,adult (05/06/2021), BMI 31.0-31.9,adult (08/24/2021), BMI 32.0-32.9,adult (10/01/2024), BMI 33.0-33.9,adult (12/14/2021), Constipation (12/14/2021), COVID-19 (05/06/2021), Diabetes mellitus ([facility]), DM2 (diabetes mellitus, type 2) ([facility]), Elevated blood pressure reading (10/01/2024), Elevated blood pressure reading in office without diagnosis of hypertension (03/31/2022), Elevated cortisol level, Elevated hemoglobin ([facility]) (10/01/2024), Hand pain, right, High triglycerides, Hypertriglyceridemia (05/02/2021), Microalbuminuria (05/18/2021), Mild intermittent asthma without complication (05/02/2021), Mixed  hyperlipidemia (10/01/2024), Obesity, Rhinitis, allergic (05/06/2021), Sinobronchitis, Skin rash (07/14/2022), Thrombocytosis (10/01/2024), Type 2 diabetes mellitus (HCC), and Vitamin D deficiency (07/09/2024).,  _______________________________________________________________________  Past Surgical History:   has a past surgical history that includes Cholecystectomy; [place] tooth extraction; and Hand surgery (Right).,  _______________________________________________________________________  Family History:  family history includes COPD in her father; Diabetes in her mother; Fibromyalgia in her father; Heart attack in her maternal grandfather and maternal grandmother; Heart disease in her maternal grandfather and maternal grandmother; Hypertension in her mother; No Known Problems in her brother, daughter, and paternal grandmother; Prostate cancer in her paternal grandfather; Stroke in her maternal grandmother.,  _______________________________________________________________________  Social History:   reports that she has never smoked. She has never used smokeless tobacco. She reports current alcohol use. She reports that she does not use drugs.,  _______________________________________________________________________  Allergies:  is allergic to latex..  _______________________________________________________________________  Current Outpatient Medications   Medication Sig Dispense Refill    losartan (COZAAR) 25 mg tablet Take 1 tablet (25 mg total) by mouth daily 30 tablet 2    Multiple Vitamin (MULTIVITAMIN ADULT PO) Take by mouth      Omega-3 Fatty Acids (Fish Oil) 1000 MG CPDR Take by mouth      semaglutide, 0.25 or 0.5 mg/dose, (Ozempic, 0.25 or 0.5 MG/DOSE,) 2 mg/3 mL injection pen 0.25 mg under the skin every 7 days for 4 doses (28 days), THEN 0.5 mg under the skin every 7 days 9 mL 0    Vitamin D, Cholecalciferol, 25 MCG (1000 UT) CAPS Take 1 tablet by mouth in the morning      Blood Glucose Monitoring Suppl  "(OneTouch Verio Flex System) w/Device KIT Dispense 1 kit per insurance formulary. (Patient not taking: Reported on 7/9/2024) 1 kit 0    OneTouch Delica Lancets 33G MISC Use 6 a day or as directed. T2DM. (Patient not taking: Reported on 7/9/2024) 150 each 6     No current facility-administered medications for this visit.     _______________________________________________________________________  Review of Systems   Constitutional:  Negative for chills and fever.   HENT:  Negative for congestion, ear pain, hearing loss, nosebleeds, sinus pain, sore throat and trouble swallowing.    Eyes:  Negative for discharge, redness and visual disturbance.   Respiratory:  Negative for cough, chest tightness and shortness of breath.    Cardiovascular:  Negative for chest pain and palpitations.   Gastrointestinal:  Negative for abdominal pain, blood in stool, constipation, diarrhea, nausea and vomiting.   Genitourinary:  Negative for dysuria, flank pain and hematuria.   Musculoskeletal:  Negative for arthralgias, myalgias and neck pain.   Skin:  Negative for color change and rash.   Neurological:  Negative for dizziness, speech difficulty, weakness and headaches.   Hematological:  Does not bruise/bleed easily.   Psychiatric/Behavioral:  Negative for agitation and behavioral problems.          Objective:  Vitals:    10/01/24 0936   BP: 140/90   BP Location: Left arm   Patient Position: Sitting   Cuff Size: Large   Pulse: 92   Resp: 18   Temp: 98.7 °F (37.1 °C)   TempSrc: Temporal   SpO2: 98%   Weight: 94.3 kg (208 lb)   Height: 5' 7\" (1.702 m)     Body mass index is 32.58 kg/m².     Physical Exam  Vitals and nursing note reviewed.   Constitutional:       General: She is not in acute distress.     Appearance: She is obese.   HENT:      Head: Normocephalic and atraumatic.      Right Ear: Ear canal and external ear normal.      Left Ear: Ear canal and external ear normal.      Nose: Nose normal.      Mouth/Throat:      Mouth: Mucous " membranes are moist.   Eyes:      General: No scleral icterus.        Right eye: No discharge.         Left eye: No discharge.      Extraocular Movements: Extraocular movements intact.      Conjunctiva/sclera: Conjunctivae normal.   Cardiovascular:      Rate and Rhythm: Normal rate and regular rhythm.      Pulses: Normal pulses.      Heart sounds: No murmur heard.  Pulmonary:      Effort: Pulmonary effort is normal. No respiratory distress.      Breath sounds: Normal breath sounds.   Abdominal:      General: Bowel sounds are normal.      Palpations: Abdomen is soft.      Tenderness: There is no abdominal tenderness.   Musculoskeletal:         General: Normal range of motion.      Cervical back: Normal range of motion and neck supple. No muscular tenderness.      Right lower leg: No edema.      Left lower leg: No edema.   Skin:     General: Skin is warm.      Findings: No rash.   Neurological:      General: No focal deficit present.      Mental Status: She is alert and oriented to person, place, and time.      Motor: No weakness.      Coordination: Coordination normal.   Psychiatric:         Mood and Affect: Mood normal.         Behavior: Behavior normal.

## 2024-10-01 NOTE — TELEPHONE ENCOUNTER
Reason for call:   [x] Prior Auth  [] Other:     Caller:  [x] Patient  [] [id number] [place] - [address] [place] [address]. [phone]       Medication: semaglutide, 0.25 or 0.5 mg/dose, (Ozempic, 0.25 or 0.5 MG/DOSE,) 2 mg/3 mL injection pen     Dose/Frequency: 0.25 mg under the skin every 7 days for 4 doses (28 days), THEN 0.5 mg under the skin every 7 days,     Quantity: 9 mL    Ordering Provider:   [x] PCP/Provider -   [] Speciality/Provider -     Has the patient tried other medications and failed? If failed, which medications did they fail?    [] No   [] Yes -     Is the patient's insurance updated in EPIC?   [] Yes   [] No     Is a copy of the patient's insurance scanned in EPIC?   [] Yes   [] No

## 2024-10-04 NOTE — TELEPHONE ENCOUNTER
PA for Ozempic 0.25 mg/0.5 ml SUBMITTED     via    [x]CMM-KEY: [id number]  []Surescripts-Case ID #   []Availity-Auth ID # NDC #   []Faxed to plan   []Other website   []Phone call Case ID #     Office notes sent, clinical questions answered. Awaiting determination    Turnaround time for your insurance to make a decision on your Prior Authorization can take 7-21 business days.

## 2024-10-04 NOTE — TELEPHONE ENCOUNTER
PA for OZEMPIC 0.25 MG/0.5 ML  APPROVED     Date(s) approved 10//-//    Case #[id number]    Patient advised by          [x][employer] Message  []Phone call   [x]LMOM  []L/M to call office as no active Communication consent on file  []Unable to leave detailed message as VM not approved on Communication consent       Pharmacy advised by    [x]Fax  []Phone call    Approval letter scanned into Media No

## 2024-10-04 NOTE — TELEPHONE ENCOUNTER
PA for ozempic 0.25 mg/0.5 ml  CANCELLED due to     []Approval on file-dates approved   []Medication already on Formulary  []Brand Name Preferred  [x]Patient no longer covered by insurance      Patient advised by     []My Chart Message  []Phone call    Message sent to office clinical pool   No      Scanned into Media  no

## 2024-10-07 DIAGNOSIS — E11.9 TYPE 2 DIABETES MELLITUS WITHOUT COMPLICATION, WITH LONG-TERM CURRENT USE OF INSULIN (HCC): Primary | ICD-10-CM

## 2024-10-07 DIAGNOSIS — Z79.4 TYPE 2 DIABETES MELLITUS WITHOUT COMPLICATION, WITH LONG-TERM CURRENT USE OF INSULIN (HCC): Primary | ICD-10-CM

## 2024-10-07 RX ORDER — INSULIN GLARGINE 100 [IU]/ML
30 INJECTION, SOLUTION SUBCUTANEOUS
Qty: 15 ML | Refills: 1 | Status: SHIPPED | OUTPATIENT
Start: 2024-10-07

## 2024-10-08 ENCOUNTER — TELEPHONE (OUTPATIENT)
Age: 34
End: 2024-10-08

## 2024-10-23 DIAGNOSIS — R03.0 ELEVATED BLOOD PRESSURE READING: ICD-10-CM

## 2024-10-23 DIAGNOSIS — R80.9 MICROALBUMINURIA: ICD-10-CM

## 2024-10-23 RX ORDER — LOSARTAN POTASSIUM 25 MG/1
25 TABLET ORAL DAILY
Qty: 90 TABLET | Refills: 0 | Status: SHIPPED | OUTPATIENT
Start: 2024-10-23

## 2024-10-30 DIAGNOSIS — E11.9 TYPE 2 DIABETES MELLITUS WITHOUT COMPLICATION, WITH LONG-TERM CURRENT USE OF INSULIN (HCC): ICD-10-CM

## 2024-10-30 DIAGNOSIS — Z79.4 TYPE 2 DIABETES MELLITUS WITHOUT COMPLICATION, WITH LONG-TERM CURRENT USE OF INSULIN (HCC): ICD-10-CM

## 2024-10-31 RX ORDER — INSULIN GLARGINE-YFGN 100 [IU]/ML
0.3 INJECTION, SOLUTION SUBCUTANEOUS
Qty: 15 ML | Refills: 1 | Status: SHIPPED | OUTPATIENT
Start: 2024-10-31

## 2024-11-25 ENCOUNTER — OFFICE VISIT (OUTPATIENT)
Dept: URGENT CARE | Facility: CLINIC | Age: 34
End: 2024-11-25
Payer: COMMERCIAL

## 2024-11-25 VITALS
DIASTOLIC BLOOD PRESSURE: 85 MMHG | WEIGHT: 205 LBS | HEART RATE: 91 BPM | RESPIRATION RATE: 16 BRPM | BODY MASS INDEX: 32.18 KG/M2 | HEIGHT: 67 IN | SYSTOLIC BLOOD PRESSURE: 139 MMHG | TEMPERATURE: 98.1 F | OXYGEN SATURATION: 100 %

## 2024-11-25 DIAGNOSIS — J06.9 VIRAL URI WITH COUGH: Primary | ICD-10-CM

## 2024-11-25 PROCEDURE — S9083 URGENT CARE CENTER GLOBAL: HCPCS | Performed by: FAMILY MEDICINE

## 2024-11-25 PROCEDURE — G0382 LEV 3 HOSP TYPE B ED VISIT: HCPCS | Performed by: FAMILY MEDICINE

## 2024-11-25 RX ORDER — PREDNISONE 20 MG/1
20 TABLET ORAL 2 TIMES DAILY WITH MEALS
Qty: 8 TABLET | Refills: 0 | Status: SHIPPED | OUTPATIENT
Start: 2024-11-25

## 2024-11-26 NOTE — PROGRESS NOTES
[facility]        NAME: Suzie Bey is a 34 y.o. female  : 1990    MRN: [id number]  DATE: 2024  TIME: 7:22 PM    Assessment and Plan   Viral URI with cough [J06.9]  1. Viral URI with cough  predniSONE 20 mg tablet            Patient Instructions     Steroids given for congestion. No signs of bacterial infection today. Follow up with PCP in 3-5 days if no improvement. Proceed to ER if symptoms worsen.    Chief Complaint     Chief Complaint   Patient presents with    Cold Like Symptoms     Pt has a cold that won't go away- she reports that she has had symptoms for about 5 day- she is reporting coughing/congestion and she is taking OTC meds but they are not helping.          History of Present Illness     Suzie Bey is a 34 y.o. female presenting to the office today for upper respiratory complaints. Symptoms have been present for 5 days, and include cough and congestion. Denies fevers and chills. She has tried Mucinex DM for her symptoms, with no relief.  Sick contacts include: NA      Review of Systems     Review of Systems   Constitutional:  Positive for fever. Negative for chills and fatigue.   HENT:  Positive for congestion, postnasal drip and sore throat. Negative for ear discharge, ear pain, sinus pressure and sinus pain.    Eyes:  Negative for pain and discharge.   Respiratory:  Positive for cough. Negative for shortness of breath.    Cardiovascular:  Negative for chest pain and palpitations.   Gastrointestinal:  Negative for abdominal pain, diarrhea, nausea and vomiting.   Genitourinary:  Negative for difficulty urinating and dysuria.   Musculoskeletal:  Negative for arthralgias and myalgias.   Skin:  Negative for rash.   Neurological:  Negative for dizziness, syncope, light-headedness, numbness and headaches.   Psychiatric/Behavioral:  Negative for agitation.    All other systems reviewed and are negative.      Current Medications       Current Outpatient  Medications:     predniSONE 20 mg tablet, Take 1 tablet (20 mg total) by mouth 2 (two) times a day with meals, Disp: 8 tablet, Rfl: 0    Blood Glucose Monitoring Suppl (OneTouch Verio Flex System) w/Device KIT, Dispense 1 kit per insurance formulary. (Patient not taking: Reported on 7/9/2024), Disp: 1 kit, Rfl: 0    Insulin Glargine-yfgn (Semglee, yfgn,) 100 UNIT/ML SOPN, INJECT 0.3 ML (30 UNITS TOTAL) UNDER THE SKIN DAILY AT BEDTIME, Disp: 15 mL, Rfl: 1    losartan (COZAAR) 25 mg tablet, TAKE 1 TABLET BY MOUTH EVERY DAY, Disp: 90 tablet, Rfl: 0    Multiple Vitamin (MULTIVITAMIN ADULT PO), Take by mouth, Disp: , Rfl:     Omega-3 Fatty Acids (Fish Oil) 1000 MG CPDR, Take by mouth, Disp: , Rfl:     OneTouch Delica Lancets 33G MISC, Use 6 a day or as directed. T2DM. (Patient not taking: Reported on 7/9/2024), Disp: 150 each, Rfl: 6    semaglutide, 0.25 or 0.5 mg/dose, (Ozempic, 0.25 or 0.5 MG/DOSE,) 2 mg/3 mL injection pen, 0.25 mg under the skin every 7 days for 4 doses (28 days), THEN 0.5 mg under the skin every 7 days, Disp: 9 mL, Rfl: 0    Vitamin D, Cholecalciferol, 25 MCG (1000 UT) CAPS, Take 1 tablet by mouth in the morning, Disp: , Rfl:     Current Allergies     Allergies as of 11/25/2024 - Reviewed 11/25/2024   Allergen Reaction Noted    Latex Rash 04/12/2016            The following portions of the patient's history were reviewed and updated as appropriate: allergies, current medications, past family history, past medical history, past social history, past surgical history and problem list.     Past Medical History:   Diagnosis Date    16 weeks gestation of pregnancy 07/14/2022    Annual physical exam 07/14/2022    Asthma     BMI 29.0-29.9,adult 05/18/2021    BMI 30.0-30.9,adult 05/06/2021    BMI 31.0-31.9,adult 08/24/2021    BMI 32.0-32.9,adult 10/01/2024    BMI 33.0-33.9,adult 12/14/2021    Constipation 12/14/2021    COVID-19 05/06/2021    Diabetes mellitus (HCC)     DM2 (diabetes mellitus, type 2) (HCC)   "   Elevated blood pressure reading 10/01/2024    Elevated blood pressure reading in office without diagnosis of hypertension 03/31/2022    Elevated cortisol level     Elevated hemoglobin (HCC) 10/01/2024    Hand pain, right     High triglycerides     Hypertriglyceridemia 05/02/2021    Microalbuminuria 05/18/2021    Mild intermittent asthma without complication 05/02/2021    Mixed hyperlipidemia 10/01/2024    Obesity     Rhinitis, allergic 05/06/2021    Sinobronchitis     Skin rash 07/14/2022    Thrombocytosis 10/01/2024    Type 2 diabetes mellitus (HCC)     Vitamin D deficiency 07/09/2024       Past Surgical History:   Procedure Laterality Date    CHOLECYSTECTOMY      HAND SURGERY Right     WISDOM TOOTH EXTRACTION         Family History   Problem Relation Age of Onset    Diabetes Mother     Hypertension Mother     COPD Father     Fibromyalgia Father     No Known Problems Brother     Heart disease Maternal Grandmother     Stroke Maternal Grandmother     Heart attack Maternal Grandmother     Heart disease Maternal Grandfather     Heart attack Maternal Grandfather     No Known Problems Paternal Grandmother     Prostate cancer Paternal Grandfather     No Known Problems Daughter        Medications have been verified.    Objective     /85   Pulse 91   Temp 98.1 °F (36.7 °C)   Resp 16   Ht 5' 7\" (1.702 m)   Wt 93 kg (205 lb)   SpO2 100%   BMI 32.11 kg/m²   No LMP recorded. Patient has had an implant.     Physical Exam     Physical Exam  Vitals reviewed.   Constitutional:       General: She is not in acute distress.     Appearance: Normal appearance. She is not ill-appearing.   HENT:      Head: Normocephalic and atraumatic.      Right Ear: Tympanic membrane and ear canal normal. No middle ear effusion.      Left Ear: Tympanic membrane and ear canal normal.  No middle ear effusion.      Mouth/Throat:      Mouth: Mucous membranes are moist.      Pharynx: Postnasal drip present. No oropharyngeal exudate.      " Tonsils: No tonsillar exudate.   Eyes:      Extraocular Movements: Extraocular movements intact.      Conjunctiva/sclera: Conjunctivae normal.      Pupils: Pupils are equal, round, and reactive to light.   Cardiovascular:      Rate and Rhythm: Normal rate and regular rhythm.      Pulses: Normal pulses.      Heart sounds: Normal heart sounds. No murmur heard.  Pulmonary:      Effort: Pulmonary effort is normal. No respiratory distress.      Breath sounds: Normal breath sounds. No wheezing.   Musculoskeletal:      Cervical back: Normal range of motion and neck supple. No tenderness.   Skin:     General: Skin is warm.   Neurological:      General: No focal deficit present.      Mental Status: She is alert.   Psychiatric:         Mood and Affect: Mood normal.         Behavior: Behavior normal.         Judgment: Judgment normal.

## 2024-12-12 DIAGNOSIS — Z79.4 TYPE 2 DIABETES MELLITUS WITHOUT COMPLICATION, WITH LONG-TERM CURRENT USE OF INSULIN (HCC): ICD-10-CM

## 2024-12-12 DIAGNOSIS — E11.9 TYPE 2 DIABETES MELLITUS WITHOUT COMPLICATION, WITH LONG-TERM CURRENT USE OF INSULIN (HCC): ICD-10-CM

## 2024-12-13 RX ORDER — INSULIN GLARGINE-YFGN 100 [IU]/ML
0.3 INJECTION, SOLUTION SUBCUTANEOUS
Qty: 30 ML | Refills: 1 | Status: SHIPPED | OUTPATIENT
Start: 2024-12-13

## 2024-12-30 NOTE — PROGRESS NOTES
Name: Suzie Bey      : 1990      MRN: [id number]  Encounter Provider: ALEXIS Patricia  Encounter Date: 2024   Encounter department: [facility]  :  Assessment & Plan  Type 2 diabetes mellitus with hyperglycemia, with long-term current use of insulin (HCC)  A1c improved from previous lab draw however above goal of less than 7%.    Reviewed basic pathophysiology of type 2 diabetes and insulin resistance.    Would benefit from GLP-1 agonist class of medication however all appear to be cost prohibitive.  Will start with resumption of metformin XR 1000 mg twice daily.  Continue Semglee 40 units nightly.  Discussed ultimate goal is to discontinue insulin.    Continue diet and lifestyle including attention to the amount and type of carbohydrates consumed as well as increased physical activity as tolerated.  She defers visit to medical nutrition therapy at this time she reports she attended these classes recently when she had gestational diabetes.    Reviewed risks as well as signs and symptoms of hypoglycemia.  Rule of 15's provided in AVS for appropriate treatment.  She knows to call the office for blood glucose levels less than 70 mg/dL or persistent patterns over 250 mg/dL.  Start Dexcom G7 continuous glucose monitor so that we may have more comprehensive picture of her blood glucose levels throughout the day.  When not using G7, check blood glucose levels at least once daily, different times of the day.    Follow-up in 3 months.  Labs prior to next visit.  Lab Results   Component Value Date    HGBA1C 8.5 (A) 2024     Orders:  •  Ambulatory Referral to Endocrinology  •  POCT hemoglobin A1c  •  Continuous Glucose Sensor (Dexcom G7 Sensor); Use 1 Device see administration instructions Use 1 sensor every 10 days to monitor blood glucose levels continously  •  metFORMIN (GLUCOPHAGE-XR) 500 mg 24 hr tablet; Take 2 tablets (1,000 mg  total) by mouth 2 (two) times a day with meals  •  glucose blood (OneTouch Verio) test strip; Use to check blood glucose levels daily  •  Hemoglobin A1C; Future  •  Comprehensive metabolic panel; Future  •  Albumin / creatinine urine ratio; Future  •  TSH, 3rd generation with Free T4 reflex; Future    Mixed hyperlipidemia  Will recheck lipid panel prior to next visit.  Continue to improve glycemic control.  Continue to follow a balanced diet.  Orders:  •  Lipid Panel with Direct LDL reflex; Future    Microalbuminuria  Repeat albumin/creatinine urine ratio prior to next visit.  Continue to improve glycemic control.  May consider initiation of SGLT2 inhibitor if microalbuminuria persists.  Continue losartan.  Orders:  •  Ambulatory Referral to Endocrinology  •  Albumin / creatinine urine ratio; Future    Class 1 obesity due to excess calories with serious comorbidity and body mass index (BMI) of 34.0 to 34.9 in adult  BMI 34.36    Would benefit from GLP-1 agonist class of medication for weight loss however these are cost prohibitive.    Continue to decrease caloric intake, follow balanced diet, avoid processed foods and sweetened beverages, and stay well-hydrated.  Increase physical activity as tolerated.  Recommend walking 15 minutes after meals.  Recommend 30 minutes of moderate intensity exercise 5 days a week.           History of Present Illness {?Quick Links Encounters * My Last Note * Last Note in Specialty * Snapshot * Since Last Visit * History :15512}  HPI  Suzie Bey is a 34 y.o. female who presents to the office today establish care for type 2 diabetes, with long term insulin use.  She was initially diagnosed in 2021 through routine bloodwork. She has history of gestational diabetes requiring insulin.  Diabetes course has been stable;denies hospitalizations for diabetes. Complications of diabetes include: Microalbuminuria, HLD.  She has not followed with endocrinology previously and her primary  "care provider has been managing her diabetes thus far.  Her most recent A1c is 11.5%. Her POCT A1c in the office today is 8.5%.    Denies recent episodes of hypoglycemia.  Symptoms of hypoglycemia include: lethargic, \"feels off\"    Current home glucose monitoring: SMBG once daily  Fastin-215     Current Medication Regimen:   Semglee 40 units nightly  Ozempic 0.25 mg weekly -did not start, cost prohibitive    Previously on metformin, discontinued for unknown reasons  Previously on glyburide, discontinued due to pregnancy    Diabetic Eye Exam: Needs to schedule  Follows with Podiatry: No  Influenza Vaccine: No  Has Thyroid Disorder: No  Hypertension, taking: Losartan 25 mg daily  Hyperlipidemia, taking: Not on statin  History of Pancreatitis: No  Diabetes Education: Attended while pregnant    History obtained from: patient    Review of Systems   Constitutional:  Negative for chills and fever.   HENT:  Negative for congestion and sore throat.    Eyes:  Negative for pain and visual disturbance.   Respiratory:  Negative for cough and shortness of breath.    Cardiovascular:  Negative for chest pain and palpitations.   Gastrointestinal:  Negative for constipation and diarrhea.   Endocrine: Negative for polydipsia and polyuria.   Musculoskeletal:  Negative for gait problem.   Skin:  Negative for wound.   Neurological:  Negative for headaches.   Psychiatric/Behavioral:  Negative for sleep disturbance. The patient is not nervous/anxious.      Current Outpatient Medications on File Prior to Visit   Medication Sig Dispense Refill   • losartan (COZAAR) 25 mg tablet TAKE 1 TABLET BY MOUTH EVERY DAY 90 tablet 0   • Multiple Vitamin (MULTIVITAMIN ADULT PO) Take by mouth     • Omega-3 Fatty Acids (Fish Oil) 1000 MG CPDR Take by mouth     • Vitamin D, Cholecalciferol, 25 MCG (1000 UT) CAPS Take 1 tablet by mouth in the morning     • [DISCONTINUED] Insulin Glargine-yfgn (Semglee, yfgn,) 100 UNIT/ML SOPN INJECT 0.3 ML (30 UNITS " "TOTAL) UNDER THE SKIN DAILY AT BEDTIME (Patient taking differently: Inject 40 Units as directed daily at bedtime) 30 mL 1   • Blood Glucose Monitoring Suppl (OneTouch Verio Flex System) w/Device KIT Dispense 1 kit per insurance formulary. (Patient not taking: Reported on 7/9/2024) 1 kit 0   • OneTouch Delica Lancets 33G MISC Use 6 a day or as directed. T2DM. (Patient not taking: Reported on 7/9/2024) 150 each 6   • [DISCONTINUED] predniSONE 20 mg tablet Take 1 tablet (20 mg total) by mouth 2 (two) times a day with meals (Patient not taking: Reported on 12/31/2024) 8 tablet 0   • [DISCONTINUED] semaglutide, 0.25 or 0.5 mg/dose, (Ozempic, 0.25 or 0.5 MG/DOSE,) 2 mg/3 mL injection pen 0.25 mg under the skin every 7 days for 4 doses (28 days), THEN 0.5 mg under the skin every 7 days (Patient not taking: Reported on 12/31/2024) 9 mL 0     No current facility-administered medications on file prior to visit.      Social History     Tobacco Use   • Smoking status: Never   • Smokeless tobacco: Never   Vaping Use   • Vaping status: Never Used   Substance and Sexual Activity   • Alcohol use: Yes     Comment: occasionally   • Drug use: No   • Sexual activity: Yes     Partners: Male     Birth control/protection: I.U.D.        Objective {?Quick Links Trend Vitals * Enter New Vitals * Results Review * Timeline (Adult) * Labs * Imaging * Cardiology * Procedures * Lung Cancer Screening * Surgical eConsent :95533}  /76 (BP Location: Right arm, Patient Position: Sitting, Cuff Size: Standard)   Pulse 104   Temp 98 °F (36.7 °C)   Ht 5' 7\" (1.702 m)   Wt 99.5 kg (219 lb 6.4 oz)   SpO2 98%   BMI 34.36 kg/m²      Physical Exam  Vitals reviewed.   Constitutional:       General: She is not in acute distress.     Appearance: Normal appearance. She is well-groomed. She is obese.   HENT:      Head: Normocephalic and atraumatic.      Mouth/Throat:      Mouth: Mucous membranes are moist.   Eyes:      Conjunctiva/sclera: Conjunctivae " normal.   Cardiovascular:      Rate and Rhythm: Normal rate.   Pulmonary:      Effort: Pulmonary effort is normal. No respiratory distress.   Abdominal:      Palpations: Abdomen is soft.      Tenderness: There is no abdominal tenderness.   Musculoskeletal:         General: No swelling.      Cervical back: Normal range of motion.   Skin:     General: Skin is warm and dry.      Capillary Refill: Capillary refill takes less than 2 seconds.   Neurological:      General: No focal deficit present.      Mental Status: She is alert and oriented to person, place, and time.   Psychiatric:         Mood and Affect: Mood normal.         Behavior: Behavior normal. Behavior is cooperative.         Thought Content: Thought content normal.         Administrative Statements {?Quick Links Full Problem List * Level of Service * [facility]:16815}  I have spent a total time of 40 minutes in caring for this patient on the day of the visit/encounter including Diagnostic results, Prognosis, Risks and benefits of tx options, Instructions for management, Patient and family education, Importance of tx compliance, Risk factor reductions, Impressions, Counseling / Coordination of care, Documenting in the medical record, Reviewing / ordering tests, medicine, procedures  , and Obtaining or reviewing history  .

## 2024-12-31 ENCOUNTER — OFFICE VISIT (OUTPATIENT)
Age: 34
End: 2024-12-31
Payer: COMMERCIAL

## 2024-12-31 VITALS
HEIGHT: 67 IN | OXYGEN SATURATION: 98 % | HEART RATE: 104 BPM | WEIGHT: 219.4 LBS | DIASTOLIC BLOOD PRESSURE: 76 MMHG | TEMPERATURE: 98 F | BODY MASS INDEX: 34.44 KG/M2 | SYSTOLIC BLOOD PRESSURE: 118 MMHG

## 2024-12-31 DIAGNOSIS — E66.811 CLASS 1 OBESITY DUE TO EXCESS CALORIES WITH SERIOUS COMORBIDITY AND BODY MASS INDEX (BMI) OF 34.0 TO 34.9 IN ADULT: ICD-10-CM

## 2024-12-31 DIAGNOSIS — E66.09 CLASS 1 OBESITY DUE TO EXCESS CALORIES WITH SERIOUS COMORBIDITY AND BODY MASS INDEX (BMI) OF 34.0 TO 34.9 IN ADULT: ICD-10-CM

## 2024-12-31 DIAGNOSIS — R80.9 MICROALBUMINURIA: ICD-10-CM

## 2024-12-31 DIAGNOSIS — E11.9 TYPE 2 DIABETES MELLITUS WITHOUT COMPLICATION, WITH LONG-TERM CURRENT USE OF INSULIN (HCC): ICD-10-CM

## 2024-12-31 DIAGNOSIS — Z79.4 TYPE 2 DIABETES MELLITUS WITH HYPERGLYCEMIA, WITH LONG-TERM CURRENT USE OF INSULIN (HCC): Primary | ICD-10-CM

## 2024-12-31 DIAGNOSIS — Z79.4 TYPE 2 DIABETES MELLITUS WITHOUT COMPLICATION, WITH LONG-TERM CURRENT USE OF INSULIN (HCC): ICD-10-CM

## 2024-12-31 DIAGNOSIS — E11.65 TYPE 2 DIABETES MELLITUS WITH HYPERGLYCEMIA, WITH LONG-TERM CURRENT USE OF INSULIN (HCC): Primary | ICD-10-CM

## 2024-12-31 DIAGNOSIS — E78.2 MIXED HYPERLIPIDEMIA: ICD-10-CM

## 2024-12-31 LAB — SL AMB POCT HEMOGLOBIN AIC: 8.5 (ref ?–6.5)

## 2024-12-31 PROCEDURE — 83036 HEMOGLOBIN GLYCOSYLATED A1C: CPT

## 2024-12-31 PROCEDURE — 99244 OFF/OP CNSLTJ NEW/EST MOD 40: CPT

## 2024-12-31 RX ORDER — BLOOD SUGAR DIAGNOSTIC
STRIP MISCELLANEOUS
Qty: 100 EACH | Refills: 3 | Status: SHIPPED | OUTPATIENT
Start: 2024-12-31

## 2024-12-31 RX ORDER — METFORMIN HYDROCHLORIDE 500 MG/1
1000 TABLET, EXTENDED RELEASE ORAL 2 TIMES DAILY WITH MEALS
Qty: 360 TABLET | Refills: 1 | Status: SHIPPED | OUTPATIENT
Start: 2024-12-31

## 2024-12-31 RX ORDER — ACYCLOVIR 400 MG/1
1 TABLET ORAL SEE ADMIN INSTRUCTIONS
Qty: 3 EACH | Refills: 11 | Status: SHIPPED | OUTPATIENT
Start: 2024-12-31

## 2024-12-31 RX ORDER — INSULIN GLARGINE-YFGN 100 [IU]/ML
40 INJECTION, SOLUTION SUBCUTANEOUS
Qty: 45 ML | Refills: 1 | Status: SHIPPED | OUTPATIENT
Start: 2024-12-31

## 2024-12-31 NOTE — ASSESSMENT & PLAN NOTE
Will recheck lipid panel prior to next visit.  Continue to improve glycemic control.  Continue to follow a balanced diet.  Orders:  •  Lipid Panel with Direct LDL reflex; Future

## 2024-12-31 NOTE — ASSESSMENT & PLAN NOTE
A1c improved from previous lab draw however above goal of less than 7%.    Reviewed basic pathophysiology of type 2 diabetes and insulin resistance.    Would benefit from GLP-1 agonist class of medication however all appear to be cost prohibitive.  Will start with resumption of metformin XR 1000 mg twice daily.  Continue Semglee 40 units nightly.  Discussed ultimate goal is to discontinue insulin.    Continue diet and lifestyle including attention to the amount and type of carbohydrates consumed as well as increased physical activity as tolerated.  She defers visit to medical nutrition therapy at this time she reports she attended these classes recently when she had gestational diabetes.    Reviewed risks as well as signs and symptoms of hypoglycemia.  Rule of 15's provided in AVS for appropriate treatment.  She knows to call the office for blood glucose levels less than 70 mg/dL or persistent patterns over 250 mg/dL.  Start Dexcom G7 continuous glucose monitor so that we may have more comprehensive picture of her blood glucose levels throughout the day.  When not using G7, check blood glucose levels at least once daily, different times of the day.    Follow-up in 3 months.  Labs prior to next visit.  Lab Results   Component Value Date    HGBA1C 8.5 (A) 12/31/2024     Orders:  •  Ambulatory Referral to Endocrinology  •  POCT hemoglobin A1c  •  Continuous Glucose Sensor (Dexcom G7 Sensor); Use 1 Device see administration instructions Use 1 sensor every 10 days to monitor blood glucose levels continously  •  metFORMIN (GLUCOPHAGE-XR) 500 mg 24 hr tablet; Take 2 tablets (1,000 mg total) by mouth 2 (two) times a day with meals  •  glucose blood (OneTouch Verio) test strip; Use to check blood glucose levels daily  •  Hemoglobin A1C; Future  •  Comprehensive metabolic panel; Future  •  Albumin / creatinine urine ratio; Future  •  TSH, 3rd generation with Free T4 reflex; Future

## 2024-12-31 NOTE — ASSESSMENT & PLAN NOTE
Repeat albumin/creatinine urine ratio prior to next visit.  Continue to improve glycemic control.  May consider initiation of SGLT2 inhibitor if microalbuminuria persists.  Continue losartan.  Orders:  •  Ambulatory Referral to Endocrinology  •  Albumin / creatinine urine ratio; Future

## 2024-12-31 NOTE — PATIENT INSTRUCTIONS
Call the office for blood glucose levels less than 70 mg/dL or persistent patterns over 250 mg/dL.  Start Metformin 1000 mg twice daily  Continue Semglee 40 units daily  Start Dexcom G7 continuous glucose monitor  Download G7 cas and clarity cas  Use same username/login for clarity cas as needed for G7 cas to sync the data  Use clarity to share with the office, clinic code: Anh        Low Blood Sugar  Steps to treat low blood sugar.    1. Test blood sugar if you have symptoms of low blood sugar:   Low Blood Sugar Symptoms:  o Sweaty  o Dizzy  o Rapid heartbeat  o Shaky  o Bad mood  o Hungry    2. Treat blood sugar less than 70 with 15 grams of fast-acting carbohydrate:   Examples of 15 grams Fast-Acting Carbohydrate:  o 4 oz juice/ 4 oz regular soda  o 1 tablespoon honey  o 1 pack of fun size skittles (about 15 individual skittles)  o 12 gummy bears  o 4 starburst   o 3-4 hard candies (chew)  o 3-4 glucose tablets (chew)            3.   Wait 15 minutes and test your blood sugar again         4.   If blood sugar is less than 100, repeat steps 2-3.    5.   When your blood sugar is 100 or more, eat a snack if it will be longer than one hour until your next meal. The snack should be 15 grams of carbohydrate and a protein:   Examples of snacks:  o ½ sandwich  o 6 crackers with cheese or with peanut butter  o Piece of fruit with cheese or peanut butter

## 2024-12-31 NOTE — ASSESSMENT & PLAN NOTE
BMI 34.36    Would benefit from GLP-1 agonist class of medication for weight loss however these are cost prohibitive.    Continue to decrease caloric intake, follow balanced diet, avoid processed foods and sweetened beverages, and stay well-hydrated.  Increase physical activity as tolerated.  Recommend walking 15 minutes after meals.  Recommend 30 minutes of moderate intensity exercise 5 days a week.

## 2025-01-23 DIAGNOSIS — R03.0 ELEVATED BLOOD PRESSURE READING: ICD-10-CM

## 2025-01-23 DIAGNOSIS — R80.9 MICROALBUMINURIA: ICD-10-CM

## 2025-01-23 RX ORDER — LOSARTAN POTASSIUM 25 MG/1
25 TABLET ORAL DAILY
Qty: 90 TABLET | Refills: 0 | Status: SHIPPED | OUTPATIENT
Start: 2025-01-23

## 2025-02-24 DIAGNOSIS — E11.65 TYPE 2 DIABETES MELLITUS WITH HYPERGLYCEMIA, WITH LONG-TERM CURRENT USE OF INSULIN (HCC): Primary | ICD-10-CM

## 2025-02-24 DIAGNOSIS — Z79.4 TYPE 2 DIABETES MELLITUS WITH HYPERGLYCEMIA, WITH LONG-TERM CURRENT USE OF INSULIN (HCC): Primary | ICD-10-CM

## 2025-03-28 ENCOUNTER — RESULTS FOLLOW-UP (OUTPATIENT)
Dept: ENDOCRINOLOGY | Facility: CLINIC | Age: 35
End: 2025-03-28

## 2025-03-28 ENCOUNTER — APPOINTMENT (OUTPATIENT)
Dept: LAB | Facility: MEDICAL CENTER | Age: 35
End: 2025-03-28
Payer: COMMERCIAL

## 2025-03-28 LAB
ALBUMIN SERPL BCG-MCNC: 4.2 G/DL (ref 3.5–5)
ALP SERPL-CCNC: 56 U/L (ref 34–104)
ALT SERPL W P-5'-P-CCNC: 14 U/L (ref 7–52)
ANION GAP SERPL CALCULATED.3IONS-SCNC: 10 MMOL/L (ref 4–13)
AST SERPL W P-5'-P-CCNC: 19 U/L (ref 13–39)
BASOPHILS # BLD AUTO: 0.02 THOUSANDS/ÂΜL (ref 0–0.1)
BASOPHILS NFR BLD AUTO: 0 % (ref 0–1)
BILIRUB SERPL-MCNC: 0.7 MG/DL (ref 0.2–1)
BUN SERPL-MCNC: 10 MG/DL (ref 5–25)
CALCIUM SERPL-MCNC: 9 MG/DL (ref 8.4–10.2)
CHLORIDE SERPL-SCNC: 104 MMOL/L (ref 96–108)
CHOLEST SERPL-MCNC: 149 MG/DL (ref ?–200)
CO2 SERPL-SCNC: 26 MMOL/L (ref 21–32)
CREAT SERPL-MCNC: 0.56 MG/DL (ref 0.6–1.3)
CREAT UR-MCNC: 109 MG/DL
EOSINOPHIL # BLD AUTO: 0.1 THOUSAND/ÂΜL (ref 0–0.61)
EOSINOPHIL NFR BLD AUTO: 1 % (ref 0–6)
ERYTHROCYTE [DISTWIDTH] IN BLOOD BY AUTOMATED COUNT: 12.3 % (ref 11.6–15.1)
EST. AVERAGE GLUCOSE BLD GHB EST-MCNC: 143 MG/DL
GFR SERPL CREATININE-BSD FRML MDRD: 123 ML/MIN/1.73SQ M
GLUCOSE P FAST SERPL-MCNC: 84 MG/DL (ref 65–99)
HBA1C MFR BLD: 6.6 %
HCT VFR BLD AUTO: 43.6 % (ref 34.8–46.1)
HDLC SERPL-MCNC: 45 MG/DL
HGB BLD-MCNC: 14.1 G/DL (ref 11.5–15.4)
IMM GRANULOCYTES # BLD AUTO: 0.01 THOUSAND/UL (ref 0–0.2)
IMM GRANULOCYTES NFR BLD AUTO: 0 % (ref 0–2)
LDLC SERPL CALC-MCNC: 93 MG/DL (ref 0–100)
LYMPHOCYTES # BLD AUTO: 2.75 THOUSANDS/ÂΜL (ref 0.6–4.47)
LYMPHOCYTES NFR BLD AUTO: 37 % (ref 14–44)
MCH RBC QN AUTO: 29.7 PG (ref 26.8–34.3)
MCHC RBC AUTO-ENTMCNC: 32.3 G/DL (ref 31.4–37.4)
MCV RBC AUTO: 92 FL (ref 82–98)
MICROALBUMIN UR-MCNC: 18.7 MG/L
MICROALBUMIN/CREAT 24H UR: 17 MG/G CREATININE (ref 0–30)
MONOCYTES # BLD AUTO: 0.47 THOUSAND/ÂΜL (ref 0.17–1.22)
MONOCYTES NFR BLD AUTO: 6 % (ref 4–12)
NEUTROPHILS # BLD AUTO: 4.14 THOUSANDS/ÂΜL (ref 1.85–7.62)
NEUTS SEG NFR BLD AUTO: 56 % (ref 43–75)
NRBC BLD AUTO-RTO: 0 /100 WBCS
PLATELET # BLD AUTO: 392 THOUSANDS/UL (ref 149–390)
PMV BLD AUTO: 9.8 FL (ref 8.9–12.7)
POTASSIUM SERPL-SCNC: 3.9 MMOL/L (ref 3.5–5.3)
PROT SERPL-MCNC: 6.9 G/DL (ref 6.4–8.4)
RBC # BLD AUTO: 4.75 MILLION/UL (ref 3.81–5.12)
SODIUM SERPL-SCNC: 140 MMOL/L (ref 135–147)
TRIGL SERPL-MCNC: 55 MG/DL (ref ?–150)
TSH SERPL DL<=0.05 MIU/L-ACNC: 1.08 UIU/ML (ref 0.45–4.5)
WBC # BLD AUTO: 7.49 THOUSAND/UL (ref 4.31–10.16)

## 2025-04-01 ENCOUNTER — OFFICE VISIT (OUTPATIENT)
Age: 35
End: 2025-04-01
Payer: COMMERCIAL

## 2025-04-01 VITALS
HEART RATE: 99 BPM | BODY MASS INDEX: 34.53 KG/M2 | TEMPERATURE: 97.3 F | HEIGHT: 67 IN | SYSTOLIC BLOOD PRESSURE: 122 MMHG | WEIGHT: 220 LBS | DIASTOLIC BLOOD PRESSURE: 82 MMHG | OXYGEN SATURATION: 98 %

## 2025-04-01 DIAGNOSIS — Z79.4 TYPE 2 DIABETES MELLITUS WITH HYPERGLYCEMIA, WITH LONG-TERM CURRENT USE OF INSULIN (HCC): Primary | ICD-10-CM

## 2025-04-01 DIAGNOSIS — E78.2 MIXED HYPERLIPIDEMIA: ICD-10-CM

## 2025-04-01 DIAGNOSIS — R80.9 MICROALBUMINURIA: ICD-10-CM

## 2025-04-01 DIAGNOSIS — E66.09 CLASS 1 OBESITY DUE TO EXCESS CALORIES WITH SERIOUS COMORBIDITY AND BODY MASS INDEX (BMI) OF 34.0 TO 34.9 IN ADULT: ICD-10-CM

## 2025-04-01 DIAGNOSIS — E11.65 TYPE 2 DIABETES MELLITUS WITH HYPERGLYCEMIA, WITH LONG-TERM CURRENT USE OF INSULIN (HCC): Primary | ICD-10-CM

## 2025-04-01 DIAGNOSIS — E66.811 CLASS 1 OBESITY DUE TO EXCESS CALORIES WITH SERIOUS COMORBIDITY AND BODY MASS INDEX (BMI) OF 34.0 TO 34.9 IN ADULT: ICD-10-CM

## 2025-04-01 PROCEDURE — 95251 CONT GLUC MNTR ANALYSIS I&R: CPT

## 2025-04-01 PROCEDURE — 99214 OFFICE O/P EST MOD 30 MIN: CPT

## 2025-04-01 RX ORDER — INSULIN GLARGINE-YFGN 100 [IU]/ML
32 INJECTION, SOLUTION SUBCUTANEOUS
Qty: 30 ML | Refills: 1 | Status: SHIPPED | OUTPATIENT
Start: 2025-04-01

## 2025-04-01 RX ORDER — METFORMIN HYDROCHLORIDE 500 MG/1
1000 TABLET, EXTENDED RELEASE ORAL 2 TIMES DAILY WITH MEALS
Qty: 360 TABLET | Refills: 1 | Status: SHIPPED | OUTPATIENT
Start: 2025-04-01

## 2025-04-01 NOTE — ASSESSMENT & PLAN NOTE
A1c dramatically improved from previous visit and near goal of less than 6.5%    Decrease Semglee to 32 units nightly.  Continue metformin XR 1000 mg twice daily. Will send Ozempic to her pharmacy to see if affordable with coupon.  She denies personal/family history of medullary thyroid cancer or MEN 2 syndrome.  Denies personal history of pancreatitis.  Educated on mechanism of action as well as possible adverse effects of Ozempic.  If Ozempic is affordable, she may start with sample provided in the office to assess tolerance.    Ultimate goal is continued reduction of basal insulin.    Counseled on the long-term effects of hyperglycemia and uncontrolled diabetes including risk for heart attack, stroke, kidney failure, diabetic foot ulcers, limb loss, blindness, and death.    Reviewed risks as well as signs and symptoms of hypoglycemia.  Rule of 15's provided in AVS for appropriate treatment.  Call the office for blood glucose levels less than 70 mg/dL or persistent patterns over 250 mg/dL.      Continue to improve diet and lifestyle including attention to the amount and type of carbohydrates consumed as well as increased physical activity as tolerated.  Stay well-hydrated.    Follow-up in 4 months.  Labs prior to next visit.  Lab Results   Component Value Date    HGBA1C 6.6 (H) 03/28/2025     Orders:  •  semaglutide, 0.25 or 0.5 mg/dose, (Ozempic, 0.25 or 0.5 MG/DOSE,) 2 mg/3 mL injection pen; Inject 0.375 mL (0.25 mg total) under the skin every 7 days  •  Insulin Glargine-yfgn (Semglee, yfgn,) 100 UNIT/ML SOPN; Inject 0.32 mL (32 Units total) as directed daily at bedtime  •  metFORMIN (GLUCOPHAGE-XR) 500 mg 24 hr tablet; Take 2 tablets (1,000 mg total) by mouth 2 (two) times a day with meals  •  Basic metabolic panel; Future  •  Hemoglobin A1C; Future

## 2025-04-01 NOTE — PROGRESS NOTES
Name: Suzie Bey      : 1990      MRN: [id number]  Encounter Provider: ALEXIS Patricia  Encounter Date: 2025   Encounter department: [facility]    Type 2 Diabetes  :  Assessment & Plan  Type 2 diabetes mellitus with hyperglycemia, with long-term current use of insulin (HCC)  A1c dramatically improved from previous visit and near goal of less than 6.5%    Decrease Semglee to 32 units nightly.  Continue metformin XR 1000 mg twice daily. Will send Ozempic to her pharmacy to see if affordable with coupon.  She denies personal/family history of medullary thyroid cancer or MEN 2 syndrome.  Denies personal history of pancreatitis.  Educated on mechanism of action as well as possible adverse effects of Ozempic.  If Ozempic is affordable, she may start with sample provided in the office to assess tolerance.    Ultimate goal is continued reduction of basal insulin.    Counseled on the long-term effects of hyperglycemia and uncontrolled diabetes including risk for heart attack, stroke, kidney failure, diabetic foot ulcers, limb loss, blindness, and death.    Reviewed risks as well as signs and symptoms of hypoglycemia.  Rule of 15's provided in AVS for appropriate treatment.  Call the office for blood glucose levels less than 70 mg/dL or persistent patterns over 250 mg/dL.      Continue to improve diet and lifestyle including attention to the amount and type of carbohydrates consumed as well as increased physical activity as tolerated.  Stay well-hydrated.    Follow-up in 4 months.  Labs prior to next visit.  Lab Results   Component Value Date    HGBA1C 6.6 (H) 2025     Orders:  •  semaglutide, 0.25 or 0.5 mg/dose, (Ozempic, 0.25 or 0.5 MG/DOSE,) 2 mg/3 mL injection pen; Inject 0.375 mL (0.25 mg total) under the skin every 7 days  •  Insulin Glargine-yfgn (Semglee, yfgn,) 100 UNIT/ML SOPN; Inject 0.32 mL (32 Units total) as directed  "daily at bedtime  •  metFORMIN (GLUCOPHAGE-XR) 500 mg 24 hr tablet; Take 2 tablets (1,000 mg total) by mouth 2 (two) times a day with meals  •  Basic metabolic panel; Future  •  Hemoglobin A1C; Future    Mixed hyperlipidemia  Lipid panel improved overall.  Continue diet lifestyle modification.       Microalbuminuria  Microalbuminuria has resolved on most recent labs.  Continue losartan 25 mg daily.       Class 1 obesity due to excess calories with serious comorbidity and body mass index (BMI) of 34.0 to 34.9 in adult  Weight has remained stable since her previous appointment.    Will attempt to see if GLP-1 agonist is affordable for her.    Continue to decrease caloric intake, follow balanced diet, avoid processed foods and sweetened beverages, and stay well-hydrated.  Increase physical activity as tolerated.  Encouraged to walk for 15 minutes following meals.                 History of Present Illness   Suzie Bey is a 34 y.o. female  who presents to the office today for routine follow-up of type 2 diabetes, with long term insulin use.  She was initially diagnosed in 2021 through routine bloodwork. She has history of gestational diabetes requiring insulin.  Diabetes course has been stable; denies hospitalizations for diabetes. Complications of diabetes include: Microalbuminuria, HLD.  She was last seen in the office 12/31/2024 as a new consultation at which time her A1c was 8.5% and metformin was restarted.  Her most recent A1c is 6.6%!!!!    Frequent recent episodes of hypoglycemia.  Symptoms of hypoglycemia include: lethargic, \"feels off\", sweaty     Current home glucose monitoring: Dexcom G7    Current Medication Regimen:   Semglee 40 units nightly  Metformin XR 1000 mg twice daily     Previously on metformin, discontinued for unknown reasons  Previously on glyburide, discontinued due to pregnancy       CGM REVIEW:  Device used : Dexcom G7, Home use   Indication: Type 2 Diabetes  Date Range: 3/19/2025 - " 4/1/2025  More than 72 hours of data was reviewed. Report to be scanned to chart.     Analysis of data:   Average Glucose: 145 mg/dL  Coefficient of Variation: 25.9%  SD : 37 mg/dL  Time in Target Range: 82%  Time Above Range: 17% high; 1% very high  Time Below Range: 0% low; less than 1% very low    Interpretation of data: Blood glucose levels reasonably controlled.  Minimal episodes of postprandial hyperglycemia.  Frequent episodes of hypoglycemia observed both during the day and overnight.  Overall minimal variability noted.  GMI 6.8%.      Health Maintenance   Topic Date Due   • Diabetic Eye Exam  Never done   • Diabetic Foot Exam  07/09/2025     Pertinent Medical History     Review of Systems   Constitutional:  Negative for chills and fever.   HENT:  Negative for congestion, sore throat, trouble swallowing and voice change.    Eyes:  Negative for pain and visual disturbance.   Respiratory:  Negative for cough and shortness of breath.    Cardiovascular:  Negative for chest pain and palpitations.   Gastrointestinal:  Negative for abdominal pain, constipation, diarrhea, nausea and vomiting.   Endocrine: Negative for polydipsia and polyuria.   Genitourinary:  Negative for dysuria.   Musculoskeletal:  Negative for arthralgias and back pain.   Skin:  Negative for wound.   Neurological:  Negative for dizziness, weakness and headaches.   Psychiatric/Behavioral:  The patient is not nervous/anxious.        Current Outpatient Medications on File Prior to Visit   Medication Sig Dispense Refill   • Continuous Glucose Sensor (Dexcom G7 Sensor) Use 1 Device see administration instructions Use 1 sensor every 10 days to monitor blood glucose levels continously 3 each 11   • glucose blood (OneTouch Verio) test strip Use to check blood glucose levels daily 100 each 3   • Insulin Pen Needle 32G X 4 MM MISC Use daily 100 each 0   • losartan (COZAAR) 25 mg tablet TAKE 1 TABLET BY MOUTH EVERY DAY 90 tablet 0   • Multiple Vitamin  "(MULTIVITAMIN ADULT PO) Take by mouth     • Omega-3 Fatty Acids (Fish Oil) 1000 MG CPDR Take by mouth     • OneTouch Delica Lancets 33G MISC Use 6 a day or as directed. T2DM. 150 each 6   • Vitamin D, Cholecalciferol, 25 MCG (1000 UT) CAPS Take 1 tablet by mouth in the morning     • [DISCONTINUED] Insulin Glargine-yfgn (Semglee, yfgn,) 100 UNIT/ML SOPN Inject 0.4 mL (40 Units total) as directed daily at bedtime 45 mL 1   • [DISCONTINUED] metFORMIN (GLUCOPHAGE-XR) 500 mg 24 hr tablet Take 2 tablets (1,000 mg total) by mouth 2 (two) times a day with meals 360 tablet 1   • Blood Glucose Monitoring Suppl (OneTouch Verio Flex System) w/Device KIT Dispense 1 kit per insurance formulary. (Patient not taking: Reported on 7/9/2024) 1 kit 0     No current facility-administered medications on file prior to visit.      Social History     Tobacco Use   • Smoking status: Never   • Smokeless tobacco: Never   Vaping Use   • Vaping status: Never Used   Substance and Sexual Activity   • Alcohol use: Yes     Comment: occasionally   • Drug use: No   • Sexual activity: Yes     Partners: Male     Birth control/protection: I.U.D.        Medical History Reviewed by provider this encounter:  Tobacco  Allergies  Meds  Problems  Med Hx  Surg Hx  Fam Hx     .    Objective   /82 (BP Location: Right arm, Patient Position: Sitting, Cuff Size: Standard)   Pulse 99   Temp (!) 97.3 °F (36.3 °C) (Temporal)   Ht 5' 7\" (1.702 m)   Wt 99.8 kg (220 lb)   SpO2 98%   BMI 34.46 kg/m²      Body mass index is 34.46 kg/m².  Wt Readings from Last 3 Encounters:   04/01/25 99.8 kg (220 lb)   12/31/24 99.5 kg (219 lb 6.4 oz)   11/25/24 93 kg (205 lb)     Physical Exam  Vitals reviewed.   Constitutional:       General: She is not in acute distress.     Appearance: Normal appearance. She is well-groomed. She is obese.   HENT:      Head: Normocephalic and atraumatic.      Mouth/Throat:      Mouth: Mucous membranes are moist.   Eyes:      " Conjunctiva/sclera: Conjunctivae normal.   Cardiovascular:      Rate and Rhythm: Normal rate.   Pulmonary:      Effort: Pulmonary effort is normal. No respiratory distress.   Abdominal:      General: There is no distension.      Palpations: Abdomen is soft.   Musculoskeletal:         General: No swelling.      Cervical back: Normal range of motion.   Skin:     General: Skin is warm and dry.      Capillary Refill: Capillary refill takes less than 2 seconds.   Neurological:      General: No focal deficit present.      Mental Status: She is alert and oriented to person, place, and time.   Psychiatric:         Mood and Affect: Mood normal.         Behavior: Behavior normal. Behavior is cooperative.         Thought Content: Thought content normal.         Judgment: Judgment normal.       Labs:   Lab Results   Component Value Date    HGBA1C 6.6 (H) 03/28/2025    HGBA1C 8.5 (A) 12/31/2024    HGBA1C 11.5 (H) 09/27/2024     Lab Results   Component Value Date    CREATININE 0.56 (L) 03/28/2025    CREATININE 0.55 (L) 09/27/2024    CREATININE 0.46 (L) 12/16/2022    BUN 10 03/28/2025    K 3.9 03/28/2025     03/28/2025    CO2 26 03/28/2025     eGFR   Date Value Ref Range Status   03/28/2025 123 ml/min/1.73sq m Final     Lab Results   Component Value Date    HDL 45 (L) 03/28/2025    TRIG 55 03/28/2025     Lab Results   Component Value Date    ALT 14 03/28/2025    AST 19 03/28/2025    ALKPHOS 56 03/28/2025     Lab Results   Component Value Date    MVM7NORCLZYF 1.076 03/28/2025    ZDQ9MOSHWRSV 0.852 03/24/2022    NFR7RKHGALHM 1.120 05/13/2021         Discussed with the patient and all questioned fully answered. She will call if any problems arise.    Administrative Statements   I have spent a total time of 37 minutes in caring for this patient on the day of the visit/encounter including Diagnostic results, Prognosis, Risks and benefits of tx options, Instructions for management, Patient and family education, Importance of tx  compliance, Risk factor reductions, Impressions, Counseling / Coordination of care, Documenting in the medical record, Reviewing/placing orders in the medical record (including tests, medications, and/or procedures), and Obtaining or reviewing history  .

## 2025-04-01 NOTE — ASSESSMENT & PLAN NOTE
Weight has remained stable since her previous appointment.    Will attempt to see if GLP-1 agonist is affordable for her.    Continue to decrease caloric intake, follow balanced diet, avoid processed foods and sweetened beverages, and stay well-hydrated.  Increase physical activity as tolerated.  Encouraged to walk for 15 minutes following meals.

## 2025-04-01 NOTE — ASSESSMENT & PLAN NOTE
Lab Results   Component Value Date    HGBA1C 6.6 (H) 03/28/2025       Orders:  •  Insulin Glargine-yfgn (Semglee, yfmyrna,) 100 UNIT/ML SOPN; Inject 0.32 mL (32 Units total) as directed daily at bedtime

## 2025-04-01 NOTE — PATIENT INSTRUCTIONS
Decrease Semglee to 32 units nightly  Continue Metformin XR 1000 mg twice daily  See if ozempic is more affordable with coupon  Continue Dexcom G7  Call the office for blood glucose levels less than 70 mg/dL or persistent patterns over 250 mg/dL.    Low Blood Sugar  Steps to treat low blood sugar.    1. Test blood sugar if you have symptoms of low blood sugar:   Low Blood Sugar Symptoms:  o Sweaty  o Dizzy  o Rapid heartbeat  o Shaky  o Bad mood  o Hungry    2. Treat blood sugar less than 70 with 15 grams of fast-acting carbohydrate:   Examples of 15 grams Fast-Acting Carbohydrate:  o 4 oz juice/ 4 oz regular soda  o 1 tablespoon honey  o 1 pack of fun size skittles (about 15 individual skittles)  o 12 gummy bears  o 4 starburst   o 3-4 hard candies (chew)  o 3-4 glucose tablets (chew)            3.   Wait 15 minutes and test your blood sugar again         4.   If blood sugar is less than 100, repeat steps 2-3.    5.   When your blood sugar is 100 or more, eat a snack if it will be longer than one hour until your next meal. The snack should be 15 grams of carbohydrate and a protein:   Examples of snacks:  o ½ sandwich  o 6 crackers with cheese or with peanut butter  o Piece of fruit with cheese or peanut butter

## 2025-04-08 ENCOUNTER — OFFICE VISIT (OUTPATIENT)
Dept: INTERNAL MEDICINE CLINIC | Facility: CLINIC | Age: 35
End: 2025-04-08
Payer: COMMERCIAL

## 2025-04-08 VITALS
BODY MASS INDEX: 33.74 KG/M2 | RESPIRATION RATE: 18 BRPM | WEIGHT: 215 LBS | DIASTOLIC BLOOD PRESSURE: 80 MMHG | SYSTOLIC BLOOD PRESSURE: 128 MMHG | TEMPERATURE: 98.7 F | HEART RATE: 84 BPM | OXYGEN SATURATION: 97 % | HEIGHT: 67 IN

## 2025-04-08 DIAGNOSIS — E66.811 CLASS 1 OBESITY DUE TO EXCESS CALORIES WITH SERIOUS COMORBIDITY AND BODY MASS INDEX (BMI) OF 34.0 TO 34.9 IN ADULT: ICD-10-CM

## 2025-04-08 DIAGNOSIS — D75.839 THROMBOCYTOSIS: ICD-10-CM

## 2025-04-08 DIAGNOSIS — R03.0 ELEVATED BLOOD PRESSURE READING: ICD-10-CM

## 2025-04-08 DIAGNOSIS — E55.9 VITAMIN D DEFICIENCY: ICD-10-CM

## 2025-04-08 DIAGNOSIS — E78.2 MIXED HYPERLIPIDEMIA: ICD-10-CM

## 2025-04-08 DIAGNOSIS — R80.9 MICROALBUMINURIA: ICD-10-CM

## 2025-04-08 DIAGNOSIS — Z00.00 ANNUAL PHYSICAL EXAM: Primary | ICD-10-CM

## 2025-04-08 DIAGNOSIS — E66.09 CLASS 1 OBESITY DUE TO EXCESS CALORIES WITH SERIOUS COMORBIDITY AND BODY MASS INDEX (BMI) OF 34.0 TO 34.9 IN ADULT: ICD-10-CM

## 2025-04-08 PROCEDURE — 99395 PREV VISIT EST AGE 18-39: CPT | Performed by: INTERNAL MEDICINE

## 2025-04-08 PROCEDURE — 99213 OFFICE O/P EST LOW 20 MIN: CPT | Performed by: INTERNAL MEDICINE

## 2025-04-08 RX ORDER — LOSARTAN POTASSIUM 25 MG/1
25 TABLET ORAL DAILY
Qty: 90 TABLET | Refills: 1 | Status: SHIPPED | OUTPATIENT
Start: 2025-04-08

## 2025-04-08 NOTE — ASSESSMENT & PLAN NOTE
Vitamin D deficiency resolved vitamin D supplement vitamin D level 48 advised to continue same supplement.

## 2025-04-08 NOTE — ASSESSMENT & PLAN NOTE
Patient has been seen and followed by endocrinology.  Prescribed Ozempic if she cannot afford.  Advised to decrease calorie intake, decrease sugar and carbs intake as well as exercise and lose weight.

## 2025-04-08 NOTE — ASSESSMENT & PLAN NOTE
Thrombocytosis has been improving.  Platelet count decreased from 419 to 392 K with normal WBC hemoglobin will follow CBC  Orders:  •  CBC and differential; Future

## 2025-04-08 NOTE — ASSESSMENT & PLAN NOTE
Microalbuminuria has been improved now within normal limit.  On losartan.  Orders:  •  losartan (COZAAR) 25 mg tablet; Take 1 tablet (25 mg total) by mouth daily

## 2025-04-08 NOTE — PATIENT INSTRUCTIONS
Patient was advised to continue present medications. discussed with the patient medications and laboratory data in detail.  Follow-up with me in 6 months or as advised.  If any blood test was ordered please do 1 week prior to next appointment unless advise to get earlier.  If you have any questions please call the office [phone]

## 2025-04-08 NOTE — ASSESSMENT & PLAN NOTE
Better and controlled.  On losartan.  Advised to watch diet for salt intake  Orders:  •  losartan (COZAAR) 25 mg tablet; Take 1 tablet (25 mg total) by mouth daily

## 2025-04-08 NOTE — ASSESSMENT & PLAN NOTE
Well-controlled on diet.  Cholesterol decreased from 201 to 149, triglyceride 55, HDL 45, LDL decreased from 1 29-93 advised to continue low-cholesterol low carbs diet.  Advised to exercise and lose weight.

## 2025-04-08 NOTE — PROGRESS NOTES
Adult Annual Physical  Name: Suzie Bey      : 1990      MRN: [id number]  Encounter Provider: Lavelle Brizuela MD  Encounter Date: 2025   Encounter department: [facility]    :  Assessment & Plan  Annual physical exam  Advised to see GYN for GYN examination.       Mixed hyperlipidemia  Well-controlled on diet.  Cholesterol decreased from 201 to 149, triglyceride 55, HDL 45, LDL decreased from 1 29-93 advised to continue low-cholesterol low carbs diet.  Advised to exercise and lose weight.       Microalbuminuria  Microalbuminuria has been improved now within normal limit.  On losartan.  Orders:  •  losartan (COZAAR) 25 mg tablet; Take 1 tablet (25 mg total) by mouth daily    Vitamin D deficiency  Vitamin D deficiency resolved vitamin D supplement vitamin D level 48 advised to continue same supplement.       Thrombocytosis  Thrombocytosis has been improving.  Platelet count decreased from 419 to 392 K with normal WBC hemoglobin will follow CBC  Orders:  •  CBC and differential; Future    Class 1 obesity due to excess calories with serious comorbidity and body mass index (BMI) of 34.0 to 34.9 in adult  Patient has been seen and followed by endocrinology.  Prescribed Ozempic if she cannot afford.  Advised to decrease calorie intake, decrease sugar and carbs intake as well as exercise and lose weight.         Elevated blood pressure reading  Better and controlled.  On losartan.  Advised to watch diet for salt intake  Orders:  •  losartan (COZAAR) 25 mg tablet; Take 1 tablet (25 mg total) by mouth daily        Preventive Screenings:    - Cervical cancer screening: screening up-to-date     Immunizations:  - Immunizations due: Influenza and Prevnar 20         History of Present Illness     Adult Annual Physical:  Patient presents for annual physical.     Diet and Physical Activity:  - Diet/Nutrition: limited junk food, consuming 3-5 servings of  fruits/vegetables daily, well balanced diet and diabetic diet.  - Exercise: walking, 5-7 times a week on average and less than 30 minutes on average.    General Health:  - Sleep: sleeps well, 7-8 hours of sleep on average, daytime hypersomnolence and unrefreshing sleep.  - Hearing: normal hearing bilateral ears.  - Vision: most recent eye exam > 1 year ago and wears glasses.  - Dental: regular dental visits, brushes teeth once daily and floss regularly.    /GYN Health:  - Follows with GYN: yes.   - Menopause: premenopausal.   - History of STDs: no  - Contraception: IUD placement.      Advanced Care Planning:  - Has an advanced directive?: no    - Has a durable medical POA?: no    - ACP document given to patient?: yes          Current Outpatient Medications:   •  Continuous Glucose Sensor (Dexcom G7 Sensor), Use 1 Device see administration instructions Use 1 sensor every 10 days to monitor blood glucose levels continously, Disp: 3 each, Rfl: 11  •  glucose blood (OneTouch Verio) test strip, Use to check blood glucose levels daily, Disp: 100 each, Rfl: 3  •  Insulin Glargine-yfgn (Semglee, yfgn,) 100 UNIT/ML SOPN, Inject 0.32 mL (32 Units total) as directed daily at bedtime, Disp: 30 mL, Rfl: 1  •  Insulin Pen Needle 32G X 4 MM MISC, Use daily, Disp: 100 each, Rfl: 0  •  losartan (COZAAR) 25 mg tablet, Take 1 tablet (25 mg total) by mouth daily, Disp: 90 tablet, Rfl: 1  •  metFORMIN (GLUCOPHAGE-XR) 500 mg 24 hr tablet, Take 2 tablets (1,000 mg total) by mouth 2 (two) times a day with meals, Disp: 360 tablet, Rfl: 1  •  Multiple Vitamin (MULTIVITAMIN ADULT PO), Take by mouth, Disp: , Rfl:   •  Omega-3 Fatty Acids (Fish Oil) 1000 MG CPDR, Take by mouth, Disp: , Rfl:   •  OneTouch Delica Lancets 33G MISC, Use 6 a day or as directed. T2DM., Disp: 150 each, Rfl: 6  •  Vitamin D, Cholecalciferol, 25 MCG (1000 UT) CAPS, Take 1 tablet by mouth in the morning, Disp: , Rfl:   •  Blood Glucose Monitoring Suppl (OneTouch Verio  Flex System) w/Device KIT, Dispense 1 kit per insurance formulary. (Patient not taking: Reported on 7/9/2024), Disp: 1 kit, Rfl: 0  •  semaglutide, 0.25 or 0.5 mg/dose, (Ozempic, 0.25 or 0.5 MG/DOSE,) 2 mg/3 mL injection pen, Inject 0.375 mL (0.25 mg total) under the skin every 7 days (Patient not taking: Reported on 4/8/2025), Disp: 3 mL, Rfl: 1     Past Medical History:   Diagnosis Date   • 16 weeks gestation of pregnancy 07/14/2022   • Annual physical exam 07/14/2022   • Annual physical exam 04/08/2025   • Asthma    • BMI 29.0-29.9,adult 05/18/2021   • BMI 30.0-30.9,adult 05/06/2021   • BMI 31.0-31.9,adult 08/24/2021   • BMI 32.0-32.9,adult 10/01/2024   • BMI 33.0-33.9,adult 12/14/2021   • Class 1 obesity due to excess calories without serious comorbidity in adult 04/08/2025   • Constipation 12/14/2021   • COVID-19 05/06/2021   • Diabetes mellitus (HCC)    • DM2 (diabetes mellitus, type 2) ([facility])    • Elevated blood pressure reading 10/01/2024   • Elevated blood pressure reading in office without diagnosis of hypertension 03/31/2022   • Elevated cortisol level    • Elevated hemoglobin ([facility]) 10/01/2024   • Hand pain, right    • High triglycerides    • Hypertriglyceridemia 05/02/2021   • Microalbuminuria 05/18/2021   • Mild intermittent asthma without complication 05/02/2021   • Mixed hyperlipidemia 10/01/2024   • Obesity    • Rhinitis, allergic 05/06/2021   • Sinobronchitis    • Skin rash 07/14/2022   • Thrombocytosis 10/01/2024   • Type 2 diabetes mellitus (HCC)    • Vitamin D deficiency 07/09/2024      Review of Systems   Constitutional:  Negative for chills and fever.   HENT:  Negative for congestion, ear pain, hearing loss, nosebleeds, sinus pain, sore throat and trouble swallowing.    Eyes:  Negative for discharge, redness and visual disturbance.   Respiratory:  Negative for cough, chest tightness and shortness of breath.    Cardiovascular:  Negative for chest pain and palpitations.   Gastrointestinal:   Negative for abdominal pain, blood in stool, constipation, diarrhea, nausea and vomiting.   Genitourinary:  Negative for dysuria, flank pain, frequency and hematuria.   Musculoskeletal:  Negative for arthralgias, myalgias and neck pain.   Skin:  Negative for color change and rash.   Neurological:  Negative for dizziness, speech difficulty, weakness and headaches.   Hematological:  Does not bruise/bleed easily.   Psychiatric/Behavioral:  Negative for agitation and behavioral problems.      Pertinent Medical History   As above.        Medical History Reviewed by provider this encounter:  Tobacco  Allergies  Meds  Problems  Med Hx  Surg Hx  Fam Hx     .  Current Outpatient Medications on File Prior to Visit   Medication Sig Dispense Refill   • Continuous Glucose Sensor (Dexcom G7 Sensor) Use 1 Device see administration instructions Use 1 sensor every 10 days to monitor blood glucose levels continously 3 each 11   • glucose blood (OneTouch Verio) test strip Use to check blood glucose levels daily 100 each 3   • Insulin Glargine-yfgn (Semglee, yfgn,) 100 UNIT/ML SOPN Inject 0.32 mL (32 Units total) as directed daily at bedtime 30 mL 1   • Insulin Pen Needle 32G X 4 MM MISC Use daily 100 each 0   • metFORMIN (GLUCOPHAGE-XR) 500 mg 24 hr tablet Take 2 tablets (1,000 mg total) by mouth 2 (two) times a day with meals 360 tablet 1   • Multiple Vitamin (MULTIVITAMIN ADULT PO) Take by mouth     • Omega-3 Fatty Acids (Fish Oil) 1000 MG CPDR Take by mouth     • OneTouch Delica Lancets 33G MISC Use 6 a day or as directed. T2DM. 150 each 6   • Vitamin D, Cholecalciferol, 25 MCG (1000 UT) CAPS Take 1 tablet by mouth in the morning     • [DISCONTINUED] losartan (COZAAR) 25 mg tablet TAKE 1 TABLET BY MOUTH EVERY DAY 90 tablet 0   • Blood Glucose Monitoring Suppl (OneTouch Verio Flex System) w/Device KIT Dispense 1 kit per insurance formulary. (Patient not taking: Reported on 7/9/2024) 1 kit 0   • semaglutide, 0.25 or 0.5  "mg/dose, (Ozempic, 0.25 or 0.5 MG/DOSE,) 2 mg/3 mL injection pen Inject 0.375 mL (0.25 mg total) under the skin every 7 days (Patient not taking: Reported on 4/8/2025) 3 mL 1     No current facility-administered medications on file prior to visit.      Social History     Tobacco Use   • Smoking status: Never   • Smokeless tobacco: Never   Vaping Use   • Vaping status: Never Used   Substance and Sexual Activity   • Alcohol use: Yes     Comment: occasionally   • Drug use: No   • Sexual activity: Yes     Partners: Male     Birth control/protection: I.U.D.       Objective   /80 (BP Location: Left arm, Patient Position: Sitting, Cuff Size: Large)   Pulse 84   Temp 98.7 °F (37.1 °C) (Temporal)   Resp 18   Ht 5' 7\" (1.702 m)   Wt 97.5 kg (215 lb)   SpO2 97%   BMI 33.67 kg/m²     Physical Exam  Vitals and nursing note reviewed.   Constitutional:       General: She is not in acute distress.     Appearance: She is well-developed. She is obese.   HENT:      Head: Normocephalic and atraumatic.      Right Ear: External ear normal.      Left Ear: External ear normal.      Nose: Nose normal.      Mouth/Throat:      Mouth: Mucous membranes are moist.      Pharynx: Oropharynx is clear.   Eyes:      General: No scleral icterus.        Right eye: No discharge.         Left eye: No discharge.      Extraocular Movements: Extraocular movements intact.      Conjunctiva/sclera: Conjunctivae normal.      Pupils: Pupils are equal, round, and reactive to light.   Cardiovascular:      Rate and Rhythm: Normal rate and regular rhythm.      Pulses: Normal pulses.      Heart sounds: No murmur heard.  Pulmonary:      Effort: Pulmonary effort is normal. No respiratory distress.      Breath sounds: Normal breath sounds. No wheezing, rhonchi or rales.   Abdominal:      General: Bowel sounds are normal. There is no distension.      Palpations: Abdomen is soft.      Tenderness: There is no abdominal tenderness.   Musculoskeletal:         " General: No swelling. Normal range of motion.      Cervical back: Normal range of motion and neck supple.      Right lower leg: No edema.      Left lower leg: No edema.   Skin:     General: Skin is warm and dry.      Capillary Refill: Capillary refill takes less than 2 seconds.      Findings: No rash.   Neurological:      General: No focal deficit present.      Mental Status: She is alert and oriented to person, place, and time.      Motor: No weakness.      Coordination: Coordination normal.   Psychiatric:         Mood and Affect: Mood normal.         Behavior: Behavior normal.         Thought Content: Thought content normal.         Judgment: Judgment normal.

## 2025-04-14 ENCOUNTER — PATIENT MESSAGE (OUTPATIENT)
Age: 35
End: 2025-04-14

## 2025-04-14 DIAGNOSIS — E11.65 TYPE 2 DIABETES MELLITUS WITH HYPERGLYCEMIA, WITH LONG-TERM CURRENT USE OF INSULIN (HCC): ICD-10-CM

## 2025-04-14 DIAGNOSIS — Z79.4 TYPE 2 DIABETES MELLITUS WITH HYPERGLYCEMIA, WITH LONG-TERM CURRENT USE OF INSULIN (HCC): ICD-10-CM

## 2025-04-17 RX ORDER — INSULIN GLARGINE-YFGN 100 [IU]/ML
28 INJECTION, SOLUTION SUBCUTANEOUS
Qty: 30 ML | Refills: 1 | Status: SHIPPED | OUTPATIENT
Start: 2025-04-17

## 2025-05-22 DIAGNOSIS — E11.65 TYPE 2 DIABETES MELLITUS WITH HYPERGLYCEMIA, WITH LONG-TERM CURRENT USE OF INSULIN (HCC): ICD-10-CM

## 2025-05-22 DIAGNOSIS — Z79.4 TYPE 2 DIABETES MELLITUS WITH HYPERGLYCEMIA, WITH LONG-TERM CURRENT USE OF INSULIN (HCC): ICD-10-CM

## 2025-05-22 RX ORDER — PEN NEEDLE, DIABETIC 32GX 5/32"
NEEDLE, DISPOSABLE MISCELLANEOUS DAILY
Qty: 100 EACH | Refills: 0 | Status: SHIPPED | OUTPATIENT
Start: 2025-05-22

## 2025-06-11 DIAGNOSIS — Z79.4 TYPE 2 DIABETES MELLITUS WITH HYPERGLYCEMIA, WITH LONG-TERM CURRENT USE OF INSULIN (HCC): ICD-10-CM

## 2025-06-11 DIAGNOSIS — E11.65 TYPE 2 DIABETES MELLITUS WITH HYPERGLYCEMIA, WITH LONG-TERM CURRENT USE OF INSULIN (HCC): ICD-10-CM

## 2025-06-12 RX ORDER — INSULIN GLARGINE-YFGN 100 [IU]/ML
28 INJECTION, SOLUTION SUBCUTANEOUS
Qty: 30 ML | Refills: 1 | Status: SHIPPED | OUTPATIENT
Start: 2025-06-12

## 2025-07-21 DIAGNOSIS — Z79.4 TYPE 2 DIABETES MELLITUS WITH HYPERGLYCEMIA, WITH LONG-TERM CURRENT USE OF INSULIN (HCC): ICD-10-CM

## 2025-07-21 DIAGNOSIS — E11.65 TYPE 2 DIABETES MELLITUS WITH HYPERGLYCEMIA, WITH LONG-TERM CURRENT USE OF INSULIN (HCC): ICD-10-CM

## 2025-07-21 RX ORDER — INSULIN GLARGINE-YFGN 100 [IU]/ML
28 INJECTION, SOLUTION SUBCUTANEOUS
Qty: 30 ML | Refills: 0 | Status: CANCELLED | OUTPATIENT
Start: 2025-07-21

## 2025-07-22 DIAGNOSIS — Z79.4 TYPE 2 DIABETES MELLITUS WITH HYPERGLYCEMIA, WITH LONG-TERM CURRENT USE OF INSULIN (HCC): ICD-10-CM

## 2025-07-22 DIAGNOSIS — E11.65 TYPE 2 DIABETES MELLITUS WITH HYPERGLYCEMIA, WITH LONG-TERM CURRENT USE OF INSULIN (HCC): ICD-10-CM

## 2025-07-22 RX ORDER — INSULIN GLARGINE 100 [IU]/ML
28 INJECTION, SOLUTION SUBCUTANEOUS
Qty: 30 ML | Refills: 1 | Status: SHIPPED | OUTPATIENT
Start: 2025-07-22 | End: 2025-07-24

## 2025-07-22 NOTE — TELEPHONE ENCOUNTER
Requested medication(s) are due for refill today: Yes  Patient has already received a courtesy refill: No  Other reason request has been forwarded to provider: Patient comment: Is it possible to get a different insulin? My pharmacy is having an issue getting it in.

## 2025-07-24 RX ORDER — INSULIN DEGLUDEC 100 U/ML
28 INJECTION, SOLUTION SUBCUTANEOUS
Qty: 30 ML | Refills: 1 | Status: SHIPPED | OUTPATIENT
Start: 2025-07-24

## 2025-07-24 NOTE — TELEPHONE ENCOUNTER
Lantus not preferred.  Message sent to patient previously to find out what is the preferred insulin while Semglee is out of stock.  Insulin degludec sent as of now

## 2025-08-16 ENCOUNTER — APPOINTMENT (OUTPATIENT)
Dept: LAB | Facility: MEDICAL CENTER | Age: 35
End: 2025-08-16
Payer: COMMERCIAL

## 2025-08-16 LAB
ANION GAP SERPL CALCULATED.3IONS-SCNC: 9 MMOL/L (ref 4–13)
BASOPHILS # BLD AUTO: 0.02 THOUSANDS/ÂΜL (ref 0–0.1)
BASOPHILS NFR BLD AUTO: 0 % (ref 0–1)
BUN SERPL-MCNC: 11 MG/DL (ref 5–25)
CALCIUM SERPL-MCNC: 9.2 MG/DL (ref 8.4–10.2)
CHLORIDE SERPL-SCNC: 103 MMOL/L (ref 96–108)
CO2 SERPL-SCNC: 29 MMOL/L (ref 21–32)
CREAT SERPL-MCNC: 0.54 MG/DL (ref 0.6–1.3)
EOSINOPHIL # BLD AUTO: 0.14 THOUSAND/ÂΜL (ref 0–0.61)
EOSINOPHIL NFR BLD AUTO: 2 % (ref 0–6)
ERYTHROCYTE [DISTWIDTH] IN BLOOD BY AUTOMATED COUNT: 12.3 % (ref 11.6–15.1)
EST. AVERAGE GLUCOSE BLD GHB EST-MCNC: 140 MG/DL
GFR SERPL CREATININE-BSD FRML MDRD: 125 ML/MIN/1.73SQ M
GLUCOSE P FAST SERPL-MCNC: 84 MG/DL (ref 65–99)
HBA1C MFR BLD: 6.5 %
HCT VFR BLD AUTO: 43.7 % (ref 34.8–46.1)
HGB BLD-MCNC: 14.4 G/DL (ref 11.5–15.4)
IMM GRANULOCYTES # BLD AUTO: 0.02 THOUSAND/UL (ref 0–0.2)
IMM GRANULOCYTES NFR BLD AUTO: 0 % (ref 0–2)
LYMPHOCYTES # BLD AUTO: 3.58 THOUSANDS/ÂΜL (ref 0.6–4.47)
LYMPHOCYTES NFR BLD AUTO: 40 % (ref 14–44)
MCH RBC QN AUTO: 30.1 PG (ref 26.8–34.3)
MCHC RBC AUTO-ENTMCNC: 33 G/DL (ref 31.4–37.4)
MCV RBC AUTO: 91 FL (ref 82–98)
MONOCYTES # BLD AUTO: 0.65 THOUSAND/ÂΜL (ref 0.17–1.22)
MONOCYTES NFR BLD AUTO: 7 % (ref 4–12)
NEUTROPHILS # BLD AUTO: 4.65 THOUSANDS/ÂΜL (ref 1.85–7.62)
NEUTS SEG NFR BLD AUTO: 51 % (ref 43–75)
NRBC BLD AUTO-RTO: 0 /100 WBCS
PLATELET # BLD AUTO: 453 THOUSANDS/UL (ref 149–390)
PMV BLD AUTO: 9.8 FL (ref 8.9–12.7)
POTASSIUM SERPL-SCNC: 4.3 MMOL/L (ref 3.5–5.3)
RBC # BLD AUTO: 4.78 MILLION/UL (ref 3.81–5.12)
SODIUM SERPL-SCNC: 141 MMOL/L (ref 135–147)
WBC # BLD AUTO: 9.06 THOUSAND/UL (ref 4.31–10.16)

## 2025-08-17 ENCOUNTER — RESULTS FOLLOW-UP (OUTPATIENT)
Dept: INTERNAL MEDICINE CLINIC | Facility: CLINIC | Age: 35
End: 2025-08-17

## 2025-08-18 ENCOUNTER — E-CONSULT (OUTPATIENT)
Dept: HEMATOLOGY ONCOLOGY | Facility: CLINIC | Age: 35
End: 2025-08-18
Payer: COMMERCIAL

## 2025-08-18 DIAGNOSIS — D75.839 THROMBOCYTOSIS: Primary | ICD-10-CM

## 2025-08-18 PROCEDURE — 99451 NTRPROF PH1/NTRNET/EHR 5/>: CPT | Performed by: PHYSICIAN ASSISTANT

## 2025-08-19 ENCOUNTER — RESULTS FOLLOW-UP (OUTPATIENT)
Dept: INTERNAL MEDICINE CLINIC | Facility: CLINIC | Age: 35
End: 2025-08-19

## 2025-08-19 ENCOUNTER — APPOINTMENT (OUTPATIENT)
Dept: LAB | Facility: MEDICAL CENTER | Age: 35
End: 2025-08-19
Payer: COMMERCIAL

## 2025-08-19 ENCOUNTER — OFFICE VISIT (OUTPATIENT)
Age: 35
End: 2025-08-19
Payer: COMMERCIAL

## 2025-08-19 VITALS
TEMPERATURE: 97.7 F | HEART RATE: 102 BPM | DIASTOLIC BLOOD PRESSURE: 80 MMHG | SYSTOLIC BLOOD PRESSURE: 118 MMHG | WEIGHT: 219.6 LBS | OXYGEN SATURATION: 97 % | BODY MASS INDEX: 34.47 KG/M2 | HEIGHT: 67 IN

## 2025-08-19 DIAGNOSIS — Z79.4 TYPE 2 DIABETES MELLITUS WITH HYPERGLYCEMIA, WITH LONG-TERM CURRENT USE OF INSULIN (HCC): Primary | ICD-10-CM

## 2025-08-19 DIAGNOSIS — E11.65 TYPE 2 DIABETES MELLITUS WITH HYPERGLYCEMIA, WITH LONG-TERM CURRENT USE OF INSULIN (HCC): Primary | ICD-10-CM

## 2025-08-19 DIAGNOSIS — E66.09 CLASS 1 OBESITY DUE TO EXCESS CALORIES WITH SERIOUS COMORBIDITY AND BODY MASS INDEX (BMI) OF 34.0 TO 34.9 IN ADULT: ICD-10-CM

## 2025-08-19 DIAGNOSIS — D75.839 THROMBOCYTOSIS: ICD-10-CM

## 2025-08-19 DIAGNOSIS — D75.839 THROMBOCYTOSIS: Primary | ICD-10-CM

## 2025-08-19 DIAGNOSIS — R03.0 ELEVATED BLOOD PRESSURE READING: ICD-10-CM

## 2025-08-19 DIAGNOSIS — E66.811 CLASS 1 OBESITY DUE TO EXCESS CALORIES WITH SERIOUS COMORBIDITY AND BODY MASS INDEX (BMI) OF 34.0 TO 34.9 IN ADULT: ICD-10-CM

## 2025-08-19 LAB
EOSINOPHIL NFR BLD MANUAL: 1 % (ref 0–6)
FERRITIN SERPL-MCNC: 52 NG/ML (ref 30–307)
IRON SATN MFR SERPL: 35 % (ref 15–50)
IRON SERPL-MCNC: 104 UG/DL (ref 50–212)
LYMPHOCYTES # BLD AUTO: 34 %
MONOCYTES NFR BLD AUTO: 6 % (ref 4–12)
NEUTS SEG NFR BLD AUTO: 59 %
PLATELET BLD QL SMEAR: ADEQUATE
RBC MORPH BLD: NORMAL
TIBC SERPL-MCNC: 296.8 UG/DL (ref 250–450)
TOTAL CELLS COUNTED SPEC: 100
TRANSFERRIN SERPL-MCNC: 212 MG/DL (ref 203–362)
UIBC SERPL-MCNC: 193 UG/DL (ref 155–355)

## 2025-08-19 PROCEDURE — 83550 IRON BINDING TEST: CPT

## 2025-08-19 PROCEDURE — 82728 ASSAY OF FERRITIN: CPT

## 2025-08-19 PROCEDURE — 85007 BL SMEAR W/DIFF WBC COUNT: CPT

## 2025-08-19 PROCEDURE — 99214 OFFICE O/P EST MOD 30 MIN: CPT

## 2025-08-19 PROCEDURE — 83540 ASSAY OF IRON: CPT

## 2025-08-19 PROCEDURE — 95251 CONT GLUC MNTR ANALYSIS I&R: CPT

## 2025-08-19 PROCEDURE — 36415 COLL VENOUS BLD VENIPUNCTURE: CPT

## 2025-08-19 RX ORDER — INSULIN DEGLUDEC 100 U/ML
22 INJECTION, SOLUTION SUBCUTANEOUS
Qty: 30 ML | Refills: 1 | Status: SHIPPED | OUTPATIENT
Start: 2025-08-19

## 2025-08-21 DIAGNOSIS — Z79.4 TYPE 2 DIABETES MELLITUS WITH HYPERGLYCEMIA, WITH LONG-TERM CURRENT USE OF INSULIN (HCC): ICD-10-CM

## 2025-08-21 DIAGNOSIS — E11.65 TYPE 2 DIABETES MELLITUS WITH HYPERGLYCEMIA, WITH LONG-TERM CURRENT USE OF INSULIN (HCC): ICD-10-CM

## 2025-08-21 RX ORDER — PEN NEEDLE, DIABETIC 32GX 5/32"
NEEDLE, DISPOSABLE MISCELLANEOUS DAILY
Qty: 100 EACH | Refills: 2 | Status: SHIPPED | OUTPATIENT
Start: 2025-08-21